# Patient Record
Sex: FEMALE | Race: WHITE | NOT HISPANIC OR LATINO | Employment: FULL TIME | URBAN - METROPOLITAN AREA
[De-identification: names, ages, dates, MRNs, and addresses within clinical notes are randomized per-mention and may not be internally consistent; named-entity substitution may affect disease eponyms.]

---

## 2017-02-20 ENCOUNTER — OFFICE VISIT (OUTPATIENT)
Dept: ORTHOPEDICS | Facility: CLINIC | Age: 22
End: 2017-02-20
Payer: COMMERCIAL

## 2017-02-20 VITALS — WEIGHT: 130 LBS | TEMPERATURE: 98.7 F | HEIGHT: 67 IN | BODY MASS INDEX: 20.4 KG/M2

## 2017-02-20 DIAGNOSIS — M67.40 GANGLION OF JOINT: Primary | ICD-10-CM

## 2017-02-20 PROCEDURE — 99213 OFFICE O/P EST LOW 20 MIN: CPT | Performed by: ORTHOPAEDIC SURGERY

## 2017-02-20 ASSESSMENT — PAIN SCALES - GENERAL: PAINLEVEL: NO PAIN (0)

## 2017-02-20 NOTE — PROGRESS NOTES
"Office Visit-Follow up  HISTORY OF PRESENT ILLNESS:    Brenda Navarrete is a 21 year old female who is seen in follow up for   Chief Complaint   Patient presents with     RECHECK     Right wrist ganglion cyst.     Surgical Followup     Right excision ganglion cyst dorsal wrist.  DOS: 11/26/14 (2.5 years postop)       Patient is accompanied by her sister today.  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  Present symptoms: She has had recurrence of the right dorsal wrist ganglion cyst. It is problematic if she is forced to put her wrist in extension or does highly manipulative activities. Several weeks ago her symptoms were markedly exacerbated however it appears so this responded to some local symptomatic care with 2 days' worth of ibuprofen.  Treatments tried to this point: She is approximately 2 years after primary surgical excision of the right wrist ganglion cyst. About a year ago we injected the dorsum of her wrist which gave her good relief. She is about 4 years after a left dorsal ganglion wrist excision done by Dr. Brewster. The left wrist is doing very well.    REVIEW OF SYSTEMS  General: negative for, night sweats, dizziness, fatigue  Resp: No shortness of breath and no cough  CV: negative for chest pain, syncope or near-syncope  GI: negative for nausea, vomiting and diarrhea  : negative for dysuria and hematuria  Musculoskeletal: as above  Neurologic: negative for syncope   Hematologic: negative for bleeding disorder    Physical Exam:  Vitals: Temp 98.7  F (37.1  C) (Temporal)  Ht 1.689 m (5' 6.5\")  Wt 59 kg (130 lb)  BMI 20.67 kg/m2  BMI= Body mass index is 20.67 kg/(m^2).  Constitutional: healthy, alert and no acute distress   Psychiatric: mentation appears normal and affect normal/bright  NEURO: no focal deficits  SKIN: no excoriation or erythema. No signs of infection.    GAIT: " non-antalgic    JOINT/EXTREMITIES:     Inspection of both wrists shows a well-healed transverse dorsal incision in the mid carpal region.  On the right wrist one can visibly see dorsal swelling over the incision. This is absent left.  She has no limitations to wrist range of motion on the right or the left.  With the right wrist palmar flexion makes the swelling more prominent. This does not happen on the left side.    The dorsal wrist region is not tender today. She has no restriction to range of motion of her digits. Range of motion of her digits does not exacerbate pain.    IMAGING INTERPRETATION: Chose to not repeat x-rays today.       Impression:      ICD-10-CM    1. Ganglion of joint dorsal right wrist M67.40        Patient has had recurrent ganglion cyst dorsal aspect of her right wrist.    Plan:   The above was reviewed with Brenda and her sister.     Patient was requesting or considering at least a repeat dorsal wrist injection. However it is understood that this has low probability of long-term success.  What we agreed upon today is that repeat surgical excision is probably what is going to be necessary.  The timing of such is really going to be the issue.    We reviewed the conservative measures that she can implement to keep her symptoms under control. The mainstay would be to use a wrist splint, iced the wrist, and taken therapeutic dose of ibuprofen.  We will be calling us when that time to schedule the surgical approach which will probably be an early summer.      Return to clinic PRN    Danis Hodge MD    Please schedule for surgery, pre op H&P, and post ops.      Patient Name:  Brenda Navarrete ([id number]).  :  1995  Gender:  female  Patient Type:  Same Day Surgery  Surgeon:  Danis Hodge MD  Physician requests assist from:  PA    Procedures:    Excision of dorsal ganglion cyst right wrist   Approach:  NA  Diagnosis:  Ganglion of joint  C-arm:  No  Mini C-arm:   No  Pathology  Scheduled:  No  Special instruments/supplies:  No  Vendor Rep:  No  Anesthesia:  General  Block:  No   Block type:   Time needed:  60 minutes    [facility] Discussed:  Not Applicable    Post op 1:

## 2017-02-20 NOTE — NURSING NOTE
"Chief Complaint   Patient presents with     RECHECK     Right wrist ganglion cyst.     Surgical Followup     Right excision ganglion cyst dorsal wrist.  DOS: 11/26/14 (2.5 years postop)       Initial Temp 98.7  F (37.1  C) (Temporal)  Ht 1.689 m (5' 6.5\")  Wt 59 kg (130 lb)  BMI 20.67 kg/m2 Estimated body mass index is 20.67 kg/(m^2) as calculated from the following:    Height as of this encounter: 1.689 m (5' 6.5\").    Weight as of this encounter: 59 kg (130 lb).  Medication Reconciliation: complete   IZAIAH Ornelas  "

## 2017-02-20 NOTE — MR AVS SNAPSHOT
"              After Visit Summary   2/20/2017    Brenda Navarrete    MRN: [id number]           Patient Information     Date Of Birth          1995        Visit Information        Provider Department      2/20/2017 1:50 PM Danis Hodge MD [facility]        Today's Diagnoses     Ganglion of joint dorsal right wrist    -  1       Follow-ups after your visit        Who to contact     If you have questions or need follow up information about today's clinic visit or your schedule please contact [facility] directly at [phone].  Normal or non-critical lab and imaging results will be communicated to you by Cernosticshart, letter or phone within 4 business days after the clinic has received the results. If you do not hear from us within 7 days, please contact the clinic through Livelenst or phone. If you have a critical or abnormal lab result, we will notify you by phone as soon as possible.  Submit refill requests through Grass[employer]! or call your pharmacy and they will forward the refill request to us. Please allow 3 business days for your refill to be completed.          Additional Information About Your Visit        MyChart Information     [employer] gives you secure access to your electronic health record. If you see a primary care provider, you can also send messages to your care team and make appointments. If you have questions, please call your primary care clinic.  If you do not have a primary care provider, please call [phone] and they will assist you.        Care EveryWhere ID     This is your Care EveryWhere ID. This could be used by other organizations to access your [place] medical records  [id number]        Your Vitals Were     Temperature Height BMI (Body Mass Index)             98.7  F (37.1  C) (Temporal) 1.689 m (5' 6.5\") 20.67 kg/m2          Blood Pressure from Last 3 Encounters:   09/27/16 110/72   08/02/16 104/64   02/01/16 110/60    Weight from Last 3 Encounters: "   02/20/17 59 kg (130 lb)   09/27/16 57.6 kg (127 lb)   08/02/16 56.7 kg (125 lb)              Today, you had the following     No orders found for display       Primary Care Provider Office Phone # Fax #    Dayron Pineda [phone]       [facility] [address] [place] [address]        Thank you!     Thank you for choosing [facility]  for your care. Our goal is always to provide you with excellent care. Hearing back from our patients is one way we can continue to improve our services. Please take a few minutes to complete the written survey that you may receive in the mail after your visit with us. Thank you!             Your Updated Medication List - Protect others around you: Learn how to safely use, store and throw away your medicines at www.disposemymeds.org.          This list is accurate as of: 2/20/17  3:13 PM.  Always use your most recent med list.                   Brand Name Dispense Instructions for use    ibuprofen 200 MG tablet    ADVIL/MOTRIN    100 tablet    Take 1-2 tablets by mouth every 6 hours as needed for other (take consistently for inflammation.).       JOLESSA 0.15-0.03 MG per tablet   Generic drug:  levonorgestrel-ethinyl estradiol     91 tablet    TAKE ONE TABLET BY MOUTH ONCE DAILY       MULTIVITAMINS PO      take 1 daily

## 2017-03-16 DIAGNOSIS — N92.6 IRREGULAR MENSTRUAL CYCLE: ICD-10-CM

## 2017-03-17 NOTE — TELEPHONE ENCOUNTER
JOLESSA 0.15-0.03 MG per tablet  Last Written Prescription Date: 4/15/16  Last Fill Quantity: 91,  # refills: 3   Last Office Visit with [facility], [facility] or [facility] prescribing provider: 9/27/16

## 2017-03-20 RX ORDER — LEVONORGESTREL AND ETHINYL ESTRADIOL 0.15-0.03
KIT ORAL
Qty: 91 TABLET | Refills: 1 | Status: SHIPPED | OUTPATIENT
Start: 2017-03-20 | End: 2017-08-31

## 2017-03-20 NOTE — TELEPHONE ENCOUNTER
Prescription approved per [facility] Refill Protocol.    Michela Dillard RN

## 2017-07-07 ENCOUNTER — HEALTH MAINTENANCE LETTER (OUTPATIENT)
Age: 22
End: 2017-07-07

## 2017-08-31 DIAGNOSIS — N92.6 IRREGULAR MENSTRUAL CYCLE: ICD-10-CM

## 2017-09-01 RX ORDER — LEVONORGESTREL AND ETHINYL ESTRADIOL 0.15-0.03
KIT ORAL
Qty: 91 TABLET | Refills: 0 | Status: SHIPPED | OUTPATIENT
Start: 2017-09-01 | End: 2017-09-20

## 2017-09-01 NOTE — TELEPHONE ENCOUNTER
Medication is being filled for 1 time refill only due to:  Patient needs to be seen because it has been more than one year since last visit.     Routing to schedulers for appointment after 09/27/2017.    Michela Dillard RN

## 2017-09-06 NOTE — TELEPHONE ENCOUNTER
Patient called back and I gave her the message about her medication. She understood and scheduled an appt.     Thank you,  Viviana Chun   for [facility]

## 2017-09-20 ENCOUNTER — OFFICE VISIT (OUTPATIENT)
Dept: FAMILY MEDICINE | Facility: CLINIC | Age: 22
End: 2017-09-20
Payer: COMMERCIAL

## 2017-09-20 VITALS
BODY MASS INDEX: 20.72 KG/M2 | OXYGEN SATURATION: 99 % | RESPIRATION RATE: 16 BRPM | DIASTOLIC BLOOD PRESSURE: 82 MMHG | HEART RATE: 78 BPM | HEIGHT: 67 IN | SYSTOLIC BLOOD PRESSURE: 126 MMHG | WEIGHT: 132 LBS | TEMPERATURE: 97.9 F

## 2017-09-20 DIAGNOSIS — Z12.4 SCREENING FOR MALIGNANT NEOPLASM OF CERVIX: ICD-10-CM

## 2017-09-20 DIAGNOSIS — Z11.3 SCREENING EXAMINATION FOR VENEREAL DISEASE: ICD-10-CM

## 2017-09-20 DIAGNOSIS — Z83.438 FAMILY HISTORY OF HYPERLIPIDEMIA: Primary | ICD-10-CM

## 2017-09-20 DIAGNOSIS — N92.6 IRREGULAR MENSTRUAL CYCLE: ICD-10-CM

## 2017-09-20 LAB
CHOLEST SERPL-MCNC: 189 MG/DL
HDLC SERPL-MCNC: 61 MG/DL
LDLC SERPL CALC-MCNC: 116 MG/DL
NONHDLC SERPL-MCNC: 128 MG/DL
TRIGL SERPL-MCNC: 59 MG/DL

## 2017-09-20 PROCEDURE — 36415 COLL VENOUS BLD VENIPUNCTURE: CPT | Performed by: OBSTETRICS & GYNECOLOGY

## 2017-09-20 PROCEDURE — 87624 HPV HI-RISK TYP POOLED RSLT: CPT | Performed by: OBSTETRICS & GYNECOLOGY

## 2017-09-20 PROCEDURE — G0145 SCR C/V CYTO,THINLAYER,RESCR: HCPCS | Performed by: OBSTETRICS & GYNECOLOGY

## 2017-09-20 PROCEDURE — 99213 OFFICE O/P EST LOW 20 MIN: CPT | Performed by: OBSTETRICS & GYNECOLOGY

## 2017-09-20 PROCEDURE — 87491 CHLMYD TRACH DNA AMP PROBE: CPT | Performed by: OBSTETRICS & GYNECOLOGY

## 2017-09-20 PROCEDURE — 87591 N.GONORRHOEAE DNA AMP PROB: CPT | Performed by: OBSTETRICS & GYNECOLOGY

## 2017-09-20 PROCEDURE — 80061 LIPID PANEL: CPT | Performed by: OBSTETRICS & GYNECOLOGY

## 2017-09-20 RX ORDER — LEVONORGESTREL AND ETHINYL ESTRADIOL 0.15-0.03
1 KIT ORAL DAILY
Qty: 91 TABLET | Refills: 3 | Status: SHIPPED | OUTPATIENT
Start: 2017-09-20 | End: 2018-11-01

## 2017-09-20 ASSESSMENT — PAIN SCALES - GENERAL: PAINLEVEL: NO PAIN (0)

## 2017-09-20 NOTE — LETTER
September 25, 2017      Brenda Navarrete  [address] [place] [address]        Dear ,    We are writing to inform you of your test results.    Your test results fall within the expected range(s).    Resulted Orders   Lipid Profile   Result Value Ref Range    Cholesterol 189 <200 mg/dL    Triglycerides 59 <150 mg/dL      Comment:      Fasting specimen    HDL Cholesterol 61 >49 mg/dL    LDL Cholesterol Calculated 116 (H) <100 mg/dL      Comment:      Above desirable:  100-129 mg/dl  Borderline High:  130-159 mg/dL  High:             160-189 mg/dL  Very high:       >189 mg/dl      Non HDL Cholesterol 128 <130 mg/dL   NEISSERIA GONORRHOEA PCR   Result Value Ref Range    Specimen Descrip Cervix     N Gonorrhea PCR Negative NEG^Negative      Comment:      Negative for N. gonorrhoeae rRNA by transcription mediated amplification.  A negative result by transcription mediated amplification does not preclude   the presence of N. gonorrhoeae infection because results are dependent on   proper and adequate collection, absence of inhibitors, and sufficient rRNA to   be detected.     CHLAMYDIA TRACHOMATIS PCR   Result Value Ref Range    Specimen Description Cervix     Chlamydia Trachomatis PCR Negative NEG^Negative      Comment:      Negative for C. trachomatis rRNA by transcription mediated amplification.  A negative result by transcription mediated amplification does not preclude   the presence of C. trachomatis infection because results are dependent on   proper and adequate collection, absence of inhibitors, and sufficient rRNA to   be detected.         If you have any questions or concerns, please call the clinic at the number listed above.       Sincerely,        Dayron Pineda MD

## 2017-09-20 NOTE — NURSING NOTE
"Chief Complaint   Patient presents with     Recheck Medication       Initial /82  Pulse 78  Temp 97.9  F (36.6  C) (Temporal)  Resp 16  Ht 5' 6.5\" (1.689 m)  Wt 132 lb (59.9 kg)  SpO2 99%  Breastfeeding? No  BMI 20.99 kg/m2 Estimated body mass index is 20.99 kg/(m^2) as calculated from the following:    Height as of this encounter: 5' 6.5\" (1.689 m).    Weight as of this encounter: 132 lb (59.9 kg)..   BP completed using cuff size: regular  Medication Rec Completed    Idania Rice CMA    "

## 2017-09-20 NOTE — PROGRESS NOTES
Subjective:  Here for rechekc of birth control pill- uses it to control heavy  And irregular  periods- it works well. No side effects.      The past medical history, social history, past surgical history and family history as shown below have been reviewed by me today.  Past Medical History:   Diagnosis Date     Lyme disease 11-02    also cat scratch disease        Allergies   Allergen Reactions     No Known Allergies      Current Outpatient Prescriptions   Medication Sig Dispense Refill     SETLAKIN 0.15-0.03 MG per tablet TAKE ONE TABLET BY MOUTH ONCE DAILY 91 tablet 0     ibuprofen (ADVIL,MOTRIN) 200 MG tablet Take 1-2 tablets by mouth every 6 hours as needed for other (take consistently for inflammation.). 100 tablet 0     MULTIVITAMINS OR take 1 daily       Past Surgical History:   Procedure Laterality Date     EXCISE GANGLION WRIST  2/8/2013    Procedure: EXCISE GANGLION WRIST;  excision left wrist dorsal cyst;  Surgeon: Karina Webb MD;  Location: [facility] OR     EXCISE GANGLION WRIST Right 11/26/2014    Procedure: EXCISE GANGLION WRIST;  Surgeon: Danis Hodge MD;  Location: [facility] OR     Social History     Social History     Marital status: Single     Spouse name: N/A     Number of children: N/A     Years of education: N/A     Social History Main Topics     Smoking status: Never Smoker     Smokeless tobacco: Never Used      Comment: NO NICOTINE EXPOSURE @ HOME     Alcohol use No     Drug use: No     Sexual activity: No     Other Topics Concern     Not on file     Social History Narrative     Family History   Problem Relation Age of Onset     DIABETES Paternal Grandfather      Cardiovascular Paternal Grandfather      Hypertension Father      Lipids Father        ROS: A 12 point review of systems was done. Except for what is listed above in the HPI, the systems review is negative .      Objective: Vital signs: Blood pressure 126/82, pulse 78, temperature 97.9  F (36.6  C), temperature source Temporal,  "resp. rate 16, height 5' 6.5\" (1.689 m), weight 132 lb (59.9 kg), SpO2 99 %, not currently breastfeeding.    HEENT:    Sclerae and conjunctiva are normal.   Ear canals and TMs look normal.  Nasal mucosa is pink  - no polyps or masses seen.  sinuses are non tender to palpation.  Throat is unremarkable . Mucous membranes are moist.   Neck is supple, mobile, no adenopathy or masses palpable. The thyroid feels normal.   Normal range of motion noted.  Chest is clear to auscultation.  No wheezes, rales or rhonchi heard.  Cardiac exam is normal with s1, s2, no murmurs or adventitious sounds.Normal rate and rhythm is heard.   Abdomen is soft,  nondistended, No masses felt.No HSM. No guarding or rigidity or rebound   noted. Palpation reveals  no    tenderness   Normal bowel sounds heard.   Pelvic exam:My nurse Idania   was present to chaperone the exam.  The external genitalia appeared normal.    The vaginal vault was without bleeding  or   discharge   or odor.   The cervix was smooth   and shiny and normal in appearance.    A pap was obtained.   A gen probe   was obtained.    No vaginal support defects were noted,    Bimanual exam revealed a  Nulliparous  sized uterus. It does not   descend   well in the vaginal vault.    No adnexal masses were felt.    There was no  cervical motion tenderness.    Exam was NOT  limited by the patient's body habitus.              Assessment/Plan:    1. Irregular menses- controlled well with birth control pill. See rx for refill     2. rechekc in 1 year        COY Pineda MD              "

## 2017-09-20 NOTE — MR AVS SNAPSHOT
"              After Visit Summary   9/20/2017    Brenda Navarrete    MRN: [id number]           Patient Information     Date Of Birth          1995        Visit Information        Provider Department      9/20/2017 7:50 AM Dayron Pineda MD [facility]        Today's Diagnoses     Family history of hyperlipidemia    -  1    Irregular menstrual cycle           Follow-ups after your visit        Who to contact     If you have questions or need follow up information about today's clinic visit or your schedule please contact [facility] directly at [phone].  Normal or non-critical lab and imaging results will be communicated to you by Ornishart, letter or phone within 4 business days after the clinic has received the results. If you do not hear from us within 7 days, please contact the clinic through Ejoy [employer] or phone. If you have a critical or abnormal lab result, we will notify you by phone as soon as possible.  Submit refill requests through [employer] or call your pharmacy and they will forward the refill request to us. Please allow 3 business days for your refill to be completed.          Additional Information About Your Visit        MyChart Information     [employer] gives you secure access to your electronic health record. If you see a primary care provider, you can also send messages to your care team and make appointments. If you have questions, please call your primary care clinic.  If you do not have a primary care provider, please call [phone] and they will assist you.        Care EveryWhere ID     This is your Care EveryWhere ID. This could be used by other organizations to access your [place] medical records  [id number]        Your Vitals Were     Pulse Temperature Respirations Height Pulse Oximetry Breastfeeding?    78 97.9  F (36.6  C) (Temporal) 16 5' 6.5\" (1.689 m) 99% No    BMI (Body Mass Index)                   20.99 kg/m2            Blood Pressure " from Last 3 Encounters:   09/20/17 126/82   09/27/16 110/72   08/02/16 104/64    Weight from Last 3 Encounters:   09/20/17 132 lb (59.9 kg)   02/20/17 130 lb (59 kg)   09/27/16 127 lb (57.6 kg)              We Performed the Following     Lipid Profile          Today's Medication Changes          These changes are accurate as of: 9/20/17  8:15 AM.  If you have any questions, ask your nurse or doctor.               These medicines have changed or have updated prescriptions.        Dose/Directions    levonorgestrel-ethinyl estradiol 0.15-0.03 MG per tablet   Commonly known as:  SETLAKIN   This may have changed:  See the new instructions.   Used for:  Irregular menstrual cycle   Changed by:  Dayron Pineda MD        Dose:  1 tablet   Take 1 tablet by mouth daily   Quantity:  91 tablet   Refills:  3            Where to get your medicines      These medications were sent to [address]  [address], [facility] [address]     Phone:  [phone]     levonorgestrel-ethinyl estradiol 0.15-0.03 MG per tablet                Primary Care Provider Office Phone # Fax #    Dayron Pineda [phone] [address] [place] [address]        Equal Access to Services     ELVIS NASSAR : Hadii venkatesh davis hadasho Soomaali, waaxda luqadaha, qaybta kaalmada adeegyada, ness escobedo. So [facility] [phone].    ATENCIÓN: Si habla español, tiene a jordan disposición servicios gratuitos de asistencia lingüística. Llame al [phone].    We comply with applicable federal civil rights laws and [place] laws. We do not discriminate on the basis of race, color, national origin, age, disability sex, sexual orientation or gender identity.            Thank you!     Thank you for choosing [facility]  for your care. Our goal is always to provide you with excellent care. Hearing back from our patients is one way we can continue  to improve our services. Please take a few minutes to complete the written survey that you may receive in the mail after your visit with us. Thank you!             Your Updated Medication List - Protect others around you: Learn how to safely use, store and throw away your medicines at www.disposemymeds.org.          This list is accurate as of: 9/20/17  8:15 AM.  Always use your most recent med list.                   Brand Name Dispense Instructions for use Diagnosis    ibuprofen 200 MG tablet    ADVIL/MOTRIN    100 tablet    Take 1-2 tablets by mouth every 6 hours as needed for other (take consistently for inflammation.).    Ganglion of joint       levonorgestrel-ethinyl estradiol 0.15-0.03 MG per tablet    SETLAKIN    91 tablet    Take 1 tablet by mouth daily    Irregular menstrual cycle       MULTIVITAMINS PO      take 1 daily

## 2017-09-21 LAB
C TRACH DNA SPEC QL NAA+PROBE: NEGATIVE
N GONORRHOEA DNA SPEC QL NAA+PROBE: NEGATIVE
SPECIMEN SOURCE: NORMAL
SPECIMEN SOURCE: NORMAL

## 2017-09-22 LAB
COPATH REPORT: NORMAL
PAP: NORMAL

## 2017-09-22 NOTE — PROGRESS NOTES
Idania Please inform Brenda/ or caretaker  that this result(s) is/are normal.  Thanks. COY Pineda MD

## 2017-09-26 LAB
FINAL DIAGNOSIS: NORMAL
HPV HR 12 DNA CVX QL NAA+PROBE: NEGATIVE
HPV16 DNA SPEC QL NAA+PROBE: NEGATIVE
HPV18 DNA SPEC QL NAA+PROBE: NEGATIVE
SPECIMEN DESCRIPTION: NORMAL

## 2018-05-29 ENCOUNTER — OFFICE VISIT (OUTPATIENT)
Dept: ORTHOPEDICS | Facility: CLINIC | Age: 23
End: 2018-05-29
Payer: COMMERCIAL

## 2018-05-29 VITALS
HEART RATE: 97 BPM | TEMPERATURE: 98.5 F | DIASTOLIC BLOOD PRESSURE: 84 MMHG | WEIGHT: 129 LBS | BODY MASS INDEX: 20.25 KG/M2 | SYSTOLIC BLOOD PRESSURE: 122 MMHG | HEIGHT: 67 IN

## 2018-05-29 DIAGNOSIS — M67.40 GANGLION OF JOINT: Primary | ICD-10-CM

## 2018-05-29 PROCEDURE — 20612 ASPIRATE/INJ GANGLION CYST: CPT | Mod: RT | Performed by: ORTHOPAEDIC SURGERY

## 2018-05-29 PROCEDURE — 99212 OFFICE O/P EST SF 10 MIN: CPT | Mod: 25 | Performed by: ORTHOPAEDIC SURGERY

## 2018-05-29 RX ORDER — BETAMETHASONE SODIUM PHOSPHATE AND BETAMETHASONE ACETATE 3; 3 MG/ML; MG/ML
12 INJECTION, SUSPENSION INTRA-ARTICULAR; INTRALESIONAL; INTRAMUSCULAR; SOFT TISSUE ONCE
Qty: 2 ML | Refills: 0 | COMMUNITY
Start: 2018-05-29 | End: 2018-11-01

## 2018-05-29 ASSESSMENT — PAIN SCALES - GENERAL: PAINLEVEL: MILD PAIN (3)

## 2018-05-29 NOTE — LETTER
"    5/29/2018         RE: Brenda Navarrete  [address] [place] [facility] [address]        Dear Colleague,    Thank you for referring your patient, Brenda Navarrete, to the [facility]. Please see a copy of my visit note below.    Office Visit-Follow up  HISTORY OF PRESENT ILLNESS:    Brenda Navarrete is a 23 year old female who is seen in follow up for   Chief Complaint   Patient presents with     RECHECK     Right wrist ganglion cyst.       Patient presents with:  RECHECK: Right wrist ganglion cyst.      As a review this patient underwent an excision of the right dorsal ganglion cyst several years ago.  She has had a recurrence.  However, previous dorsal cortisone injections have given her extended relief.  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  Present symptoms: She has pain over the dorsum of the wrist.  This is exacerbated by increased used and extremes of range of motion.  Treatments tried to this point: See above.    REVIEW OF SYSTEMS    General: negative for, night sweats, dizziness, fatigue  Resp: No shortness of breath and no cough  CV: negative for chest pain, syncope or near-syncope  GI: negative for nausea, vomiting and diarrhea  : negative for dysuria and hematuria  Musculoskeletal: as above  Neurologic: negative for syncope   Hematologic: negative for bleeding disorder    Physical Exam:    Vitals: /84 (BP Location: Left arm, Patient Position: Chair, Cuff Size: Adult Regular)  Pulse 97  Temp 98.5  F (36.9  C) (Temporal)  Ht 1.689 m (5' 6.5\")  Wt 58.5 kg (129 lb)  BMI 20.51 kg/m2  BMI= Body mass index is 20.51 kg/(m^2).  Constitutional: healthy, alert and no acute distress   Psychiatric: mentation appears normal and affect normal/bright  NEURO: no focal deficits  SKIN: no excoriation or erythema. No signs of infection.    GAIT: non-antalgic    JOINT/EXTREMITIES:     One can see at the healed transverse incision over the dorsum of her wrist.  Directly " beneath this is a soft tissue fullness.  There is no surrounding redness.  There is some mild discomfort to palpation.  The cyst is soft but noncompressible.  Overall wrist range of motion is well-maintained except dorsiflexion is down by several degrees.    She has no loss of range of motion of her digits.    IMAGING INTERPRETATION: X-rays were not taken.       Impression:      ICD-10-CM    1. Ganglion of joint dorsal right wrist M67.40 DRAIN/INJECT SMALL JOINT/BURSA       Recurrent dorsal right wrist ganglion.    Plan:   The above was reviewed with Brenda and she specifically requests a repeat injection..     Because the injection is worked so well in the past and because there is always the risk of recurrence it is a reasonable thing to repeat the injection.    Therefore after consent was obtained the injection was repeated using strict sterile technique.  We infiltrated the area with 1 cc of Celestone and 3 cc of lidocaine.  I also switched from a 25-gauge needle to a 18-gauge needle and pierced the area multiple times trying to stimulate bleeding and hopeful scarring that might get it to resolve.  She did tolerate this very well.  The area was simply dressed with a Band-Aid and no splint was recommended.          Return to clinic PRN    Danis Hodge MD    Again, thank you for allowing me to participate in the care of your patient.        Sincerely,        Danis Hodge MD

## 2018-05-29 NOTE — MR AVS SNAPSHOT
"              After Visit Summary   5/29/2018    Brenda Navarrete    MRN: [id number]           Patient Information     Date Of Birth          1995        Visit Information        Provider Department      5/29/2018 7:50 AM Danis Hodge MD [facility]        Today's Diagnoses     Ganglion of joint dorsal right wrist    -  1       Follow-ups after your visit        Who to contact     If you have questions or need follow up information about today's clinic visit or your schedule please contact [facility] directly at [phone].  Normal or non-critical lab and imaging results will be communicated to you by Raisehart, letter or phone within 4 business days after the clinic has received the results. If you do not hear from us within 7 days, please contact the clinic through Livemochat or phone. If you have a critical or abnormal lab result, we will notify you by phone as soon as possible.  Submit refill requests through Desk or call your pharmacy and they will forward the refill request to us. Please allow 3 business days for your refill to be completed.          Additional Information About Your Visit        MyChart Information     Desk gives you secure access to your electronic health record. If you see a primary care provider, you can also send messages to your care team and make appointments. If you have questions, please call your primary care clinic.  If you do not have a primary care provider, please call [phone] and they will assist you.        Care EveryWhere ID     This is your Care EveryWhere ID. This could be used by other organizations to access your Oral medical records  [id number]        Your Vitals Were     Pulse Temperature Height BMI (Body Mass Index)          97 98.5  F (36.9  C) (Temporal) 1.689 m (5' 6.5\") 20.51 kg/m2         Blood Pressure from Last 3 Encounters:   05/29/18 122/84   09/20/17 126/82   09/27/16 110/72    Weight from Last 3 " Encounters:   05/29/18 58.5 kg (129 lb)   09/20/17 59.9 kg (132 lb)   02/20/17 59 kg (130 lb)              We Performed the Following     DRAIN/INJECT SMALL JOINT/BURSA        Primary Care Provider Office Phone # Fax #    Dayron Pineda [phone] [address] [place] [address]        Equal Access to Services     KARL NASSAR : Hadii aad ku hadasho Soomaali, waaxda luqadaha, qaybta kaalmada adeegyada, waxay idiin hayaan marcie trevinojuanmayuri paul . So [facility] [phone].    ATENCIÓN: Si dianla espkelsi, tiene a jordan disposición servicios gratuitos de asistencia lingüística. Llame al [phone].    We comply with applicable federal civil rights laws and [place] laws. We do not discriminate on the basis of race, color, national origin, age, disability, sex, sexual orientation, or gender identity.            Thank you!     Thank you for choosing [facility]  for your care. Our goal is always to provide you with excellent care. Hearing back from our patients is one way we can continue to improve our services. Please take a few minutes to complete the written survey that you may receive in the mail after your visit with us. Thank you!             Your Updated Medication List - Protect others around you: Learn how to safely use, store and throw away your medicines at www.disposemymeds.org.          This list is accurate as of 5/29/18  8:51 AM.  Always use your most recent med list.                   Brand Name Dispense Instructions for use Diagnosis    ibuprofen 200 MG tablet    ADVIL/MOTRIN    100 tablet    Take 1-2 tablets by mouth every 6 hours as needed for other (take consistently for inflammation.).    Ganglion of joint       levonorgestrel-ethinyl estradiol 0.15-0.03 MG per tablet    SETLAKIN    91 tablet    Take 1 tablet by mouth daily    Irregular menstrual cycle       MULTIVITAMINS PO      take 1 daily

## 2018-05-29 NOTE — PROGRESS NOTES
"Office Visit-Follow up  HISTORY OF PRESENT ILLNESS:    Brenda Navarrete is a 23 year old female who is seen in follow up for   Chief Complaint   Patient presents with     RECHECK     Right wrist ganglion cyst.       Patient presents with:  RECHECK: Right wrist ganglion cyst.      As a review this patient underwent an excision of the right dorsal ganglion cyst several years ago.  She has had a recurrence.  However, previous dorsal cortisone injections have given her extended relief.  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  Present symptoms: She has pain over the dorsum of the wrist.  This is exacerbated by increased used and extremes of range of motion.  Treatments tried to this point: See above.    REVIEW OF SYSTEMS    General: negative for, night sweats, dizziness, fatigue  Resp: No shortness of breath and no cough  CV: negative for chest pain, syncope or near-syncope  GI: negative for nausea, vomiting and diarrhea  : negative for dysuria and hematuria  Musculoskeletal: as above  Neurologic: negative for syncope   Hematologic: negative for bleeding disorder    Physical Exam:    Vitals: /84 (BP Location: Left arm, Patient Position: Chair, Cuff Size: Adult Regular)  Pulse 97  Temp 98.5  F (36.9  C) (Temporal)  Ht 1.689 m (5' 6.5\")  Wt 58.5 kg (129 lb)  BMI 20.51 kg/m2  BMI= Body mass index is 20.51 kg/(m^2).  Constitutional: healthy, alert and no acute distress   Psychiatric: mentation appears normal and affect normal/bright  NEURO: no focal deficits  SKIN: no excoriation or erythema. No signs of infection.    GAIT: non-antalgic    JOINT/EXTREMITIES:     One can see at the healed transverse incision over the dorsum of her wrist.  Directly beneath this is a soft tissue fullness.  There is no surrounding redness.  There is some mild discomfort to palpation.  The cyst is soft but noncompressible.  Overall wrist range of motion is well-maintained except dorsiflexion is down by several " degrees.    She has no loss of range of motion of her digits.    IMAGING INTERPRETATION: X-rays were not taken.       Impression:      ICD-10-CM    1. Ganglion of joint dorsal right wrist M67.40 DRAIN/INJECT SMALL JOINT/BURSA       Recurrent dorsal right wrist ganglion.    Plan:   The above was reviewed with Brenda and she specifically requests a repeat injection..     Because the injection is worked so well in the past and because there is always the risk of recurrence it is a reasonable thing to repeat the injection.    Therefore after consent was obtained the injection was repeated using strict sterile technique.  We infiltrated the area with 1 cc of Celestone and 3 cc of lidocaine.  I also switched from a 25-gauge needle to a 18-gauge needle and pierced the area multiple times trying to stimulate bleeding and hopeful scarring that might get it to resolve.  She did tolerate this very well.  The area was simply dressed with a Band-Aid and no splint was recommended.          Return to clinic JIMYN    Danis Hodge MD

## 2018-11-01 ENCOUNTER — OFFICE VISIT (OUTPATIENT)
Dept: FAMILY MEDICINE | Facility: CLINIC | Age: 23
End: 2018-11-01
Payer: COMMERCIAL

## 2018-11-01 VITALS
SYSTOLIC BLOOD PRESSURE: 126 MMHG | OXYGEN SATURATION: 97 % | WEIGHT: 134 LBS | HEIGHT: 67 IN | DIASTOLIC BLOOD PRESSURE: 82 MMHG | RESPIRATION RATE: 16 BRPM | BODY MASS INDEX: 21.03 KG/M2 | HEART RATE: 76 BPM | TEMPERATURE: 97.4 F

## 2018-11-01 DIAGNOSIS — Z23 NEED FOR PROPHYLACTIC VACCINATION AND INOCULATION AGAINST INFLUENZA: ICD-10-CM

## 2018-11-01 DIAGNOSIS — Z00.00 ENCOUNTER FOR WELL ADULT EXAM WITHOUT ABNORMAL FINDINGS: Primary | ICD-10-CM

## 2018-11-01 DIAGNOSIS — N92.6 IRREGULAR MENSTRUAL CYCLE: ICD-10-CM

## 2018-11-01 DIAGNOSIS — Z11.3 SCREEN FOR STD (SEXUALLY TRANSMITTED DISEASE): ICD-10-CM

## 2018-11-01 PROCEDURE — 87591 N.GONORRHOEAE DNA AMP PROB: CPT | Performed by: OBSTETRICS & GYNECOLOGY

## 2018-11-01 PROCEDURE — 99395 PREV VISIT EST AGE 18-39: CPT | Mod: 25 | Performed by: OBSTETRICS & GYNECOLOGY

## 2018-11-01 PROCEDURE — 90471 IMMUNIZATION ADMIN: CPT | Performed by: OBSTETRICS & GYNECOLOGY

## 2018-11-01 PROCEDURE — 90686 IIV4 VACC NO PRSV 0.5 ML IM: CPT | Performed by: OBSTETRICS & GYNECOLOGY

## 2018-11-01 PROCEDURE — 87491 CHLMYD TRACH DNA AMP PROBE: CPT | Performed by: OBSTETRICS & GYNECOLOGY

## 2018-11-01 RX ORDER — LEVONORGESTREL AND ETHINYL ESTRADIOL 0.15-0.03
1 KIT ORAL DAILY
Qty: 91 TABLET | Refills: 3 | Status: SHIPPED | OUTPATIENT
Start: 2018-11-01 | End: 2019-10-07

## 2018-11-01 ASSESSMENT — ENCOUNTER SYMPTOMS
PARESTHESIAS: 0
ABDOMINAL PAIN: 0
FREQUENCY: 0
NERVOUS/ANXIOUS: 0
HEARTBURN: 0
ARTHRALGIAS: 0
SORE THROAT: 0
COUGH: 0
PALPITATIONS: 0
HEMATOCHEZIA: 0
HEADACHES: 0
WEAKNESS: 0
DIARRHEA: 0
BREAST MASS: 0
SHORTNESS OF BREATH: 0
DIZZINESS: 0
CHILLS: 0
JOINT SWELLING: 0
FEVER: 0
DYSURIA: 0
CONSTIPATION: 0
NAUSEA: 0
HEMATURIA: 0
EYE PAIN: 0
MYALGIAS: 0

## 2018-11-01 ASSESSMENT — PAIN SCALES - GENERAL: PAINLEVEL: NO PAIN (0)

## 2018-11-01 NOTE — PROGRESS NOTES
Injectable Influenza Immunization Documentation    1.  Is the person to be vaccinated sick today?   No    2. Does the person to be vaccinated have an allergy to a component   of the vaccine?   No  Egg Allergy Algorithm Link    3. Has the person to be vaccinated ever had a serious reaction   to influenza vaccine in the past?   No    4. Has the person to be vaccinated ever had Guillain-Barré syndrome?   No    Form completed by Nicho Arellano MA  Prior to injection verified patient identity using patient's name and date of birth.  Per orders of Dr. Pineda, injection of fluzone given by Nicho Arellano. Patient instructed to remain in clinic for 20 minutes afterwards, and to report any adverse reaction to me immediately. Nicho Arelalno MA

## 2018-11-01 NOTE — PROGRESS NOTES
"Subjective:Brenda is here for yearly physical. Current concerns are: none        Past Medical History:   Diagnosis Date     Lyme disease 11-02    also cat scratch disease      Current Outpatient Prescriptions   Medication Sig Dispense Refill     levonorgestrel-ethinyl estradiol (SETLAKIN) 0.15-0.03 MG per tablet Take 1 tablet by mouth daily 91 tablet 3     MULTIVITAMINS OR take 1 daily       ibuprofen (ADVIL,MOTRIN) 200 MG tablet Take 1-2 tablets by mouth every 6 hours as needed for other (take consistently for inflammation.). (Patient not taking: Reported on 11/1/2018) 100 tablet 0     [DISCONTINUED] levonorgestrel-ethinyl estradiol (SETLAKIN) 0.15-0.03 MG per tablet Take 1 tablet by mouth daily 91 tablet 3      Allergies   Allergen Reactions     No Known Allergies       History   Smoking Status     Never Smoker   Smokeless Tobacco     Never Used     Comment: NO NICOTINE EXPOSURE @ HOME      Past Surgical History:   Procedure Laterality Date     EXCISE GANGLION WRIST  2/8/2013    Procedure: EXCISE GANGLION WRIST;  excision left wrist dorsal cyst;  Surgeon: Karina Webb MD;  Location: [facility]     EXCISE GANGLION WRIST Right 11/26/2014    Procedure: EXCISE GANGLION WRIST;  Surgeon: Danis Hodge MD;  Location: [facility] OR      Social History   Substance Use Topics     Smoking status: Never Smoker     Smokeless tobacco: Never Used      Comment: NO NICOTINE EXPOSURE @ HOME     Alcohol use No      Family History   Problem Relation Age of Onset     Diabetes Paternal Grandfather      Cardiovascular Paternal Grandfather      Hypertension Father      Lipids Father        ROS: A 12 point review of systems is negative except for the following:  See above      Physical Exam: /82  Pulse 76  Temp 97.4  F (36.3  C) (Temporal)  Resp 16  Ht 5' 6.5\" (1.689 m)  Wt 134 lb (60.8 kg)  SpO2 97%  Breastfeeding? No  BMI 21.3 kg/m2  HEENT:    Sclerae and conjunctiva are normal.   Ear canals and TMs look normal.  Nasal " mucosa is pink  - no polyps or masses seen.  sinuses are non tender to palpation.  Throat is unremarkable . Mucous membranes are moist.   Neck is supple, mobile, no adenopathy or masses palpable. The thyroid feels normal.   Normal range of motion noted.  Chest is clear to auscultation.  No wheezes, rales or rhonchi heard.  Cardiac exam is normal with s1, s2, no murmurs or adventitious sounds.Normal rate and rhythm is heard.   Abdomen is soft,  nondistended, No masses felt.No HSM. No guarding or rigidity or rebound   noted. Palpation reveals  no    tenderness   Normal bowel sounds heard.   Pelvic exam:she declined today.      The breast exam was declined.      We did discuss the option for an exam   and I suggested that she should be doing a self-breast exam   monthly and after the age of 40 a yearly mammogram   and she feels comfortable that this is sufficient.           Assessment/Plan:    The yearly exam is normal.            Additional Plan:Diet and rest and exercise discussed.      I have advised going for a 5 mile walk daily if possible       See labs and orders.    .Immunizations reviewed(TDAP, pneumovax, shingles vaccine,etc)and discussed-including advice for yearly flu shot/tetanus update.     The following vaccines given today:   Flu vax          Calcium supplementation advised        I advised the following exams with specialists:    1. Dental evaluation yearly    2. Dermatology evaluation for mole exam yearly    3. Ophthalmology exam yearly to check for glaucoma, etc        Living will was discussed.         Followup in 12   months, sooner if concerns arise.   COY Pineda MD(electronic signature)    Answers for HPI/ROS submitted by the patient on 11/1/2018   Annual Exam:  Frequency of exercise:: 2-3 days/week  Getting at least 3 servings of Calcium per day:: Yes  Diet:: Regular (no restrictions)  Taking medications regularly:: No  Medication side effects:: None  Bi-annual eye exam:: Yes  Dental care twice  a year:: Yes  Sleep apnea or symptoms of sleep apnea:: None  Negative for the following: abdominal pain, Blood in stool, Blood in urine, chest pain, chills, congestion, constipation, cough, diarrhea, dizziness, ear pain, eye pain, nervous/anxious, fever, frequency, genital sores, headaches, hearing loss, heartburn, arthralgias, joint swelling, peripheral edema, mood changes, myalgias, nausea, dysuria, palpitations, Skin sensation changes, sore throat, urgency, rash, shortness of breath, visual disturbance, weakness  pelvic pain: No  vaginal bleeding: No  vaginal discharge: No  tenderness: No  breast mass: No  breast discharge: No  Additional concerns today:: No  PHQ-2 Score: 0  Duration of exercise:: 30-45 minutes  Barriers to taking medications:: None

## 2018-11-01 NOTE — MR AVS SNAPSHOT
After Visit Summary   11/1/2018    Brenda Navarrete    MRN: [id number]           Patient Information     Date Of Birth          1995        Visit Information        Provider Department      11/1/2018 8:50 AM Dayron Pineda MD [facility]        Today's Diagnoses     Encounter for well adult exam without abnormal findings    -  1    Irregular menstrual cycle        Screen for STD (sexually transmitted disease)        Need for prophylactic vaccination and inoculation against influenza           Follow-ups after your visit        Follow-up notes from your care team     Return in about 1 year (around 11/1/2019) for Physical Exam.      Who to contact     If you have questions or need follow up information about today's clinic visit or your schedule please contact [facility] directly at [phone].  Normal or non-critical lab and imaging results will be communicated to you by MyChart, letter or phone within 4 business days after the clinic has received the results. If you do not hear from us within 7 days, please contact the clinic through MyChart or phone. If you have a critical or abnormal lab result, we will notify you by phone as soon as possible.  Submit refill requests through [employer] or call your pharmacy and they will forward the refill request to us. Please allow 3 business days for your refill to be completed.          Additional Information About Your Visit        MyChart Information     [employer] gives you secure access to your electronic health record. If you see a primary care provider, you can also send messages to your care team and make appointments. If you have questions, please call your primary care clinic.  If you do not have a primary care provider, please call [phone] and they will assist you.        Care EveryWhere ID     This is your Care EveryWhere ID. This could be used by other organizations to access your [facility]  "records  [id number]        Your Vitals Were     Pulse Temperature Respirations Height Pulse Oximetry Breastfeeding?    76 97.4  F (36.3  C) (Temporal) 16 5' 6.5\" (1.689 m) 97% No    BMI (Body Mass Index)                   21.3 kg/m2            Blood Pressure from Last 3 Encounters:   11/01/18 126/82   05/29/18 122/84   09/20/17 126/82    Weight from Last 3 Encounters:   11/01/18 134 lb (60.8 kg)   05/29/18 129 lb (58.5 kg)   09/20/17 132 lb (59.9 kg)              We Performed the Following     CHLAMYDIA TRACHOMATIS PCR     FLU VACCINE, SPLIT VIRUS, IM (QUADRIVALENT) [65991]- >3 YRS     NEISSERIA GONORRHOEA PCR     Vaccine Administration, Initial [[id number]]          Where to get your medicines      These medications were sent to [address]  [address], [facility] [address]     Phone:  [phone]     levonorgestrel-ethinyl estradiol 0.15-0.03 MG per tablet          Primary Care Provider Office Phone # Fax #    Dayron Pineda [phone] [address] [place] [address]        Equal Access to Services     KARL NASSAR AH: Charmaine lackeyo Sojerzy, waaxda luqadaha, qaybta kaalmada adeegyada, ness escobedo. So [facility] [phone].    ATENCIÓN: Si habla español, tiene a jordan disposición servicios gratuitos de asistencia lingüística. [facility] [phone].    We comply with applicable federal civil rights laws and [place] laws. We do not discriminate on the basis of race, color, national origin, age, disability, sex, sexual orientation, or gender identity.            Thank you!     Thank you for choosing [facility]  for your care. Our goal is always to provide you with excellent care. Hearing back from our patients is one way we can continue to improve our services. Please take a few minutes to complete the written survey that you may receive in the mail after your visit with us. Thank you!      "        Your Updated Medication List - Protect others around you: Learn how to safely use, store and throw away your medicines at www.disposemymeds.org.          This list is accurate as of 11/1/18  9:30 AM.  Always use your most recent med list.                   Brand Name Dispense Instructions for use Diagnosis    ibuprofen 200 MG tablet    ADVIL/MOTRIN    100 tablet    Take 1-2 tablets by mouth every 6 hours as needed for other (take consistently for inflammation.).    Ganglion of joint       levonorgestrel-ethinyl estradiol 0.15-0.03 MG per tablet    SETLAKIN    91 tablet    Take 1 tablet by mouth daily    Irregular menstrual cycle       MULTIVITAMINS PO      take 1 daily

## 2019-09-28 ENCOUNTER — HEALTH MAINTENANCE LETTER (OUTPATIENT)
Age: 24
End: 2019-09-28

## 2019-10-06 DIAGNOSIS — N92.6 IRREGULAR MENSTRUAL CYCLE: ICD-10-CM

## 2019-10-07 DIAGNOSIS — N92.6 IRREGULAR MENSTRUAL CYCLE: ICD-10-CM

## 2019-10-07 RX ORDER — LEVONORGESTREL AND ETHINYL ESTRADIOL 0.15-0.03
1 KIT ORAL DAILY
Qty: 91 TABLET | Refills: 3 | Status: SHIPPED | OUTPATIENT
Start: 2019-10-07 | End: 2019-11-04

## 2019-10-07 RX ORDER — LEVONORGESTREL AND ETHINYL ESTRADIOL 0.15-0.03
KIT ORAL
Qty: 91 TABLET | Refills: 3 | OUTPATIENT
Start: 2019-10-07

## 2019-10-07 NOTE — TELEPHONE ENCOUNTER
"Duplicate request  Already filled today 10/7/2019  Requested Prescriptions   Pending Prescriptions Disp Refills     SETLAKIN 0.15-0.03 MG tablet [Pharmacy Med Name: SETLAKIN 0.15-0.03MG TABS] 91 tablet 3     Sig: TAKE ONE TABLET BY MOUTH DAILY   Last Written Prescription Date:  10/7/2019  Last Fill Quantity: 91,  # refills: 3   Last office visit: 11/1/2018 with prescribing provider:     Future Office Visit:        Contraceptives Protocol Passed - 10/6/2019  9:34 PM        Passed - Patient is not a current smoker if age is 35 or older        Passed - Recent (12 mo) or future (30 days) visit within the authorizing provider's specialty     Patient has had an office visit with the authorizing provider or a provider within the authorizing providers department within the previous 12 mos or has a future within next 30 days. See \"Patient Info\" tab in inbasket, or \"Choose Columns\" in Meds & Orders section of the refill encounter.              Passed - Medication is active on med list        Passed - No active pregnancy on record        Passed - No positive pregnancy test in past 12 months      Sera Jean RN      "

## 2019-11-04 ENCOUNTER — OFFICE VISIT (OUTPATIENT)
Dept: FAMILY MEDICINE | Facility: CLINIC | Age: 24
End: 2019-11-04
Payer: COMMERCIAL

## 2019-11-04 VITALS
SYSTOLIC BLOOD PRESSURE: 122 MMHG | OXYGEN SATURATION: 98 % | WEIGHT: 133.5 LBS | DIASTOLIC BLOOD PRESSURE: 78 MMHG | TEMPERATURE: 98.4 F | RESPIRATION RATE: 12 BRPM | HEART RATE: 90 BPM | HEIGHT: 66 IN | BODY MASS INDEX: 21.45 KG/M2

## 2019-11-04 DIAGNOSIS — Z23 NEED FOR PROPHYLACTIC VACCINATION AND INOCULATION AGAINST INFLUENZA: ICD-10-CM

## 2019-11-04 DIAGNOSIS — Z00.00 ENCOUNTER FOR WELL ADULT EXAM WITHOUT ABNORMAL FINDINGS: Primary | ICD-10-CM

## 2019-11-04 DIAGNOSIS — N92.6 IRREGULAR MENSTRUAL CYCLE: ICD-10-CM

## 2019-11-04 PROCEDURE — 87491 CHLMYD TRACH DNA AMP PROBE: CPT | Performed by: OBSTETRICS & GYNECOLOGY

## 2019-11-04 PROCEDURE — 99395 PREV VISIT EST AGE 18-39: CPT | Mod: 25 | Performed by: OBSTETRICS & GYNECOLOGY

## 2019-11-04 PROCEDURE — 90686 IIV4 VACC NO PRSV 0.5 ML IM: CPT | Performed by: OBSTETRICS & GYNECOLOGY

## 2019-11-04 PROCEDURE — 90471 IMMUNIZATION ADMIN: CPT | Performed by: OBSTETRICS & GYNECOLOGY

## 2019-11-04 PROCEDURE — 87591 N.GONORRHOEAE DNA AMP PROB: CPT | Performed by: OBSTETRICS & GYNECOLOGY

## 2019-11-04 RX ORDER — LEVONORGESTREL AND ETHINYL ESTRADIOL 0.15-0.03
1 KIT ORAL DAILY
Qty: 91 TABLET | Refills: 3 | Status: SHIPPED | OUTPATIENT
Start: 2019-11-04 | End: 2020-11-18

## 2019-11-04 ASSESSMENT — MIFFLIN-ST. JEOR: SCORE: 1384.44

## 2019-11-04 ASSESSMENT — PAIN SCALES - GENERAL: PAINLEVEL: NO PAIN (0)

## 2019-11-04 NOTE — PROGRESS NOTES
"Subjective:Brenda is here for yearly physical. Current concerns are: Irregular menstrual cycles and is requesting that I refill the Jolessa.        Past Medical History:   Diagnosis Date     Lyme disease 11-02    also cat scratch disease      Current Outpatient Medications   Medication Sig Dispense Refill     ibuprofen (ADVIL,MOTRIN) 200 MG tablet Take 1-2 tablets by mouth every 6 hours as needed for other (take consistently for inflammation.). 100 tablet 0     levonorgestrel-ethinyl estradiol (SETLAKIN) 0.15-0.03 MG tablet Take 1 tablet by mouth daily 91 tablet 3     MULTIVITAMINS OR take 1 daily        Allergies   Allergen Reactions     No Known Allergies       History   Smoking Status     Never Smoker   Smokeless Tobacco     Never Used     Comment: NO NICOTINE EXPOSURE @ HOME      Past Surgical History:   Procedure Laterality Date     EXCISE GANGLION WRIST  2/8/2013    Procedure: EXCISE GANGLION WRIST;  excision left wrist dorsal cyst;  Surgeon: Karina Webb MD;  Location: [facility]     EXCISE GANGLION WRIST Right 11/26/2014    Procedure: EXCISE GANGLION WRIST;  Surgeon: Danis Hodge MD;  Location: [facility] OR      Social History     Tobacco Use     Smoking status: Never Smoker     Smokeless tobacco: Never Used     Tobacco comment: NO NICOTINE EXPOSURE @ HOME   Substance Use Topics     Alcohol use: No     Drug use: No      Family History   Problem Relation Age of Onset     Diabetes Paternal Grandfather      Cardiovascular Paternal Grandfather      Hypertension Father      Lipids Father        ROS: A 12 point review of systems is negative except for the following:  See above      Physical Exam: /78   Pulse 90   Temp 98.4  F (36.9  C) (Temporal)   Resp 12   Ht 1.688 m (5' 6.45\")   Wt 60.6 kg (133 lb 8 oz)   LMP  (LMP Unknown)   SpO2 98%   Breastfeeding? No   BMI 21.26 kg/m    HEENT:    Sclerae and conjunctiva are normal.   Ear canals and TMs look normal.  Nasal mucosa is pink  - no polyps or " masses seen.  Throat is unremarkable . Mucous membranes are moist.   Neck is supple, mobile, no adenopathy or masses palpable. The thyroid feels normal.   Normal range of motion noted.  Chest is clear to auscultation.  No wheezes, rales or rhonchi heard.  Cardiac exam is normal with s1, s2, no murmurs or adventitious sounds.Normal rate and rhythm is heard.   Abdomen is soft,  nondistended, No masses felt.No HSM. No guarding or rigidity or rebound   noted. Palpation reveals  no    tenderness   Normal bowel sounds heard.   Pelvic exam:declined.          The breast exam was declined.      We did discuss the option for an exam   and I suggested that she should be doing a self-breast exam   monthly and after the age of 40 a yearly mammogram   and she feels comfortable that this is sufficient.         Assessment/Plan:    The yearly exam is normal.        Additional Plan:Diet and rest and exercise discussed.      I have advised going for a 5 mile walk daily if possible       See labs and orders.    .Immunizations reviewed(TDAP, pneumovax, shingles vaccine,etc)and discussed-including advice for yearly flu shot/tetanus update.     The following vaccines given today:   flu    Hepatitis C and HIV testing offered    these tests were declined.     Calcium supplementation advised     Colonoscopy at age 50      Mammogram  Is advised beginning at age 40-pt to be responsible for getting this done       Labs done: see orders      I advised the following exams with specialists:    1. Dental evaluation yearly    2. Dermatology evaluation for mole exam yearly    3. Ophthalmology exam yearly to check for glaucoma, etc        Living will was discussed.         Followup in 12   months, sooner if concerns arise.   COY Pineda MD(electronic signature)

## 2019-11-06 NOTE — RESULT ENCOUNTER NOTE
Kaleigh/Nicho Mccarty,Please inform Brenda/ or caretaker  that this result(s) is/are normal.  Thanks. COY Pineda MD

## 2019-12-15 ENCOUNTER — OFFICE VISIT (OUTPATIENT)
Dept: URGENT CARE | Facility: RETAIL CLINIC | Age: 24
End: 2019-12-15
Payer: COMMERCIAL

## 2019-12-15 VITALS
HEART RATE: 126 BPM | SYSTOLIC BLOOD PRESSURE: 131 MMHG | TEMPERATURE: 99 F | DIASTOLIC BLOOD PRESSURE: 83 MMHG | OXYGEN SATURATION: 99 %

## 2019-12-15 DIAGNOSIS — J02.9 ACUTE PHARYNGITIS, UNSPECIFIED ETIOLOGY: ICD-10-CM

## 2019-12-15 DIAGNOSIS — J02.0 STREP THROAT: Primary | ICD-10-CM

## 2019-12-15 LAB — S PYO AG THROAT QL IA.RAPID: ABNORMAL

## 2019-12-15 PROCEDURE — 87880 STREP A ASSAY W/OPTIC: CPT | Mod: QW | Performed by: NURSE PRACTITIONER

## 2019-12-15 PROCEDURE — 99213 OFFICE O/P EST LOW 20 MIN: CPT | Performed by: NURSE PRACTITIONER

## 2019-12-15 RX ORDER — AMOXICILLIN 500 MG/1
500 CAPSULE ORAL 2 TIMES DAILY
Qty: 20 CAPSULE | Refills: 0 | Status: SHIPPED | OUTPATIENT
Start: 2019-12-15 | End: 2020-02-19

## 2019-12-15 ASSESSMENT — ENCOUNTER SYMPTOMS
FEVER: 1
MYALGIAS: 0
CHILLS: 1
HEADACHES: 1
COUGH: 0
SLEEP DISTURBANCE: 1
VOICE CHANGE: 1
APPETITE CHANGE: 1
NAUSEA: 1
SORE THROAT: 1
ADENOPATHY: 1
DIAPHORESIS: 1

## 2019-12-15 NOTE — PROGRESS NOTES
Chief Complaint   Patient presents with     Pharyngitis     started yesterday     Headache     Fever     SUBJECTIVE:  Brenda Navarrete is a 24 year old female presenting with a chief complaint of a sore throat, headache, gi upset and fever for 2 days.  Course of illness: sudden onset and worsening.  Severity: moderate  Treatment measures tried include: Tylenol/Ibuprofen.  Predisposing factors include: has had strep before.    Past Medical History:   Diagnosis Date     Lyme disease 11-02    also cat scratch disease     levonorgestrel-ethinyl estradiol (SETLAKIN) 0.15-0.03 MG tablet, Take 1 tablet by mouth daily  MULTIVITAMINS OR, take 1 daily  ibuprofen (ADVIL,MOTRIN) 200 MG tablet, Take 1-2 tablets by mouth every 6 hours as needed for other (take consistently for inflammation.). (Patient not taking: Reported on 12/15/2019)    No current facility-administered medications on file prior to visit.     Social History     Tobacco Use     Smoking status: Never Smoker     Smokeless tobacco: Never Used     Tobacco comment: NO NICOTINE EXPOSURE @ HOME   Substance Use Topics     Alcohol use: No     Allergies   Allergen Reactions     No Known Allergies      Review of Systems   Constitutional: Positive for appetite change, chills, diaphoresis and fever.   HENT: Positive for sore throat and voice change. Negative for congestion and ear pain.    Respiratory: Negative for cough.    Gastrointestinal: Positive for nausea.   Musculoskeletal: Negative for myalgias.   Skin: Negative for rash.   Neurological: Positive for headaches.   Hematological: Positive for adenopathy.   Psychiatric/Behavioral: Positive for sleep disturbance.     OBJECTIVE:   /83   Pulse 126   Temp 99  F (37.2  C) (Oral)   SpO2 99%      Physical Exam  Vitals signs reviewed.   Constitutional:       General: She is not in acute distress.     Appearance: She is well-developed. She is not diaphoretic.   HENT:      Head: Normocephalic and atraumatic.       "Mouth/Throat:      Mouth: Mucous membranes are moist.      Pharynx: Oropharyngeal exudate and posterior oropharyngeal erythema present.   Eyes:      Conjunctiva/sclera: Conjunctivae normal.      Pupils: Pupils are equal, round, and reactive to light.   Neck:      Musculoskeletal: Normal range of motion and neck supple.   Cardiovascular:      Rate and Rhythm: Normal rate.   Pulmonary:      Effort: Pulmonary effort is normal. No respiratory distress.   Musculoskeletal: Normal range of motion.   Lymphadenopathy:      Cervical: Cervical adenopathy present.   Skin:     General: Skin is warm.      Capillary Refill: Capillary refill takes less than 2 seconds.      Findings: No rash.   Neurological:      General: No focal deficit present.      Mental Status: She is alert and oriented to person, place, and time.   Psychiatric:         Mood and Affect: Mood normal.         Behavior: Behavior normal.       Rapid Strep test is positive.    ASSESSMENT:    ICD-10-CM    1. Strep throat J02.0 amoxicillin (AMOXIL) 500 MG capsule   2. Acute pharyngitis, unspecified etiology J02.9 RAPID STREP SCREEN     PLAN:   Patient Instructions   Antibiotics as directed.  Drink plenty of fluids and rest.  May use salt water gargles- about 8 oz warm water with about 1 teaspoon salt  Chloraseptic and Cepacol spray are over the counter medications that numb the throat.  Over the counter pain relievers such as tylenol or ibuprofen may be used as needed.   Honey lemon tea helps to soothe the throat. \"Throat Coat\" tea is soothing as well.  Change toothbrush after 24 hours of antibiotics (may soak in 3-6% hydrogen peroxide)  Will be contagious for 24 hours after starting antibiotic  May return to school//work/activities 24 hours after antibiotics are started.  Wash hands frequently and do not share beverages.  Please follow up with primary care provider if symptoms are not improving, worsening or new symptoms or for any adverse reactions to " medications.    Follow up with primary care provider with any problems, questions or concerns or if symptoms worsen or fail to improve. Patient agreed to plan and verbalized understanding.    BRITTANI Brizuela-BC  [facility] [place]

## 2020-02-18 NOTE — PROGRESS NOTES
Subjective     Brenda Navarrete is a 24 year old female who presents to clinic today for the following health issues:    History of Present Illness        She eats 2-3 servings of fruits and vegetables daily.She consumes 0 sweetened beverage(s) daily.She exercises with enough effort to increase her heart rate 30 to 60 minutes per day.  She exercises with enough effort to increase her heart rate 3 or less days per week.   She is taking medications regularly.     Concern - right ear pressure.   Onset: for the last week to week and a half.     Description:   Constant pressure, she can relieve it throughout the day. Below her earlobe and her earlobe she can feel liquid or something moving around. Her hearing is muffled when she is trying to talk. She does wear a headset at work but does cover her ear not inside. Doesn't really use ear buds at all. She does say she is a little congested, runny nose. Denies any drainage or fevers.       Intensity: 3-4/10, no pain but more discomfort.     Therapies Tried and outcome: none    - no pain.  It started about 6 months ago but would come and go now in the last couple of weeks it seems to be constant.  No drainage.  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  No history of ear problems or surgeries.  No tinnitus.  No cold symptoms. No fevers or chills.     Current Outpatient Medications   Medication Sig Dispense Refill     ibuprofen (ADVIL,MOTRIN) 200 MG tablet Take 1-2 tablets by mouth every 6 hours as needed for other (take consistently for inflammation.). 100 tablet 0     levonorgestrel-ethinyl estradiol (SETLAKIN) 0.15-0.03 MG tablet Take 1 tablet by mouth daily 91 tablet 3     MULTIVITAMINS OR take 1 daily       ofloxacin 0.3 % OT otic solution Place 5 drops Into the left ear daily for 7 days 2 mL 0     Allergies   Allergen Reactions     No Known Allergies        Reviewed and updated as needed this visit by Provider  [place]   "Allergies  Meds  Problems  Med Hx  Surg Hx  Fam Hx         Review of Systems   ROS COMP: Constitutional, HEENT, cardiovascular, pulmonary, gi and gu systems are negative, except as otherwise noted.      Objective    /72   Pulse 90   Temp 99  F (37.2  C) (Temporal)   Resp 14   Ht 5' 6.73\" (1.695 m)   Wt 131 lb 9.6 oz (59.7 kg)   SpO2 99%   BMI 20.78 kg/m    Body mass index is 20.78 kg/m .  Physical Exam   GENERAL: healthy, alert and no distress  EYES: Eyes grossly normal to inspection, PERRL and conjunctivae and sclerae normal  HENT: normal cephalic/atraumatic, right ear: EAC cerumen impaction removed with ear lavage by MA TM is normal canal otherwise normal, left ear: EAC cerumen impaction partially removed with ear lavage by MA and curette by provider, there is mild cerumen against the inferior portion of TM on the floor of the EAC the TM otherwise appears normal mildly erythematous from lavage, there is irritation of the floor of the EAC from lavage without discharge, nose and mouth without ulcers or lesions, oropharynx clear and oral mucous membranes moist  NECK: no adenopathy, no asymmetry, masses, or scars and thyroid normal to palpation    Diagnostic Test Results:  Labs reviewed in Epic        Assessment & Plan       ICD-10-CM    1. Bilateral impacted cerumen H61.23 ofloxacin 0.3 % OT otic solution          Was able to successfully remove all cerumen from the right ear with ear lavage and appears normal.  The left has some retained dry cerumen present that is against the TM and on the floor, the majority was removed, the canal is aggravated from the lavage therefore elected to stop.  Given topical drops to prevent infection and help soften the remaining wax and she can try to rinse ear in the shower, advised no Q-tips, if symptoms are not improving then recommend follow-up in clinic.     Return in about 1 week (around 2/26/2020) for If not improving, sooner if worse or new concerns. "     Options for treatment and follow-up care were reviewed with the patient and/or guardian. Patient and/or guardian engaged in the decision making process and verbalized understanding of the options discussed and agreed with the final plan.     Shyanne Arrington PA-C  [facility]

## 2020-02-19 ENCOUNTER — OFFICE VISIT (OUTPATIENT)
Dept: FAMILY MEDICINE | Facility: OTHER | Age: 25
End: 2020-02-19
Payer: COMMERCIAL

## 2020-02-19 VITALS
SYSTOLIC BLOOD PRESSURE: 120 MMHG | HEIGHT: 67 IN | HEART RATE: 90 BPM | DIASTOLIC BLOOD PRESSURE: 72 MMHG | WEIGHT: 131.6 LBS | TEMPERATURE: 99 F | BODY MASS INDEX: 20.65 KG/M2 | OXYGEN SATURATION: 99 % | RESPIRATION RATE: 14 BRPM

## 2020-02-19 DIAGNOSIS — H61.23 BILATERAL IMPACTED CERUMEN: Primary | ICD-10-CM

## 2020-02-19 PROCEDURE — 69209 REMOVE IMPACTED EAR WAX UNI: CPT | Mod: 50 | Performed by: PHYSICIAN ASSISTANT

## 2020-02-19 PROCEDURE — 99213 OFFICE O/P EST LOW 20 MIN: CPT | Mod: 25 | Performed by: PHYSICIAN ASSISTANT

## 2020-02-19 RX ORDER — OFLOXACIN 3 MG/ML
5 SOLUTION AURICULAR (OTIC) DAILY
Qty: 2 ML | Refills: 0 | Status: SHIPPED | OUTPATIENT
Start: 2020-02-19 | End: 2020-06-09

## 2020-02-19 ASSESSMENT — MIFFLIN-ST. JEOR: SCORE: 1380.3

## 2020-06-09 ENCOUNTER — VIRTUAL VISIT (OUTPATIENT)
Dept: FAMILY MEDICINE | Facility: CLINIC | Age: 25
End: 2020-06-09
Payer: COMMERCIAL

## 2020-06-09 VITALS — WEIGHT: 128 LBS | BODY MASS INDEX: 20.21 KG/M2

## 2020-06-09 DIAGNOSIS — N92.0 SPOTTING: Primary | ICD-10-CM

## 2020-06-09 PROCEDURE — 99212 OFFICE O/P EST SF 10 MIN: CPT | Mod: GT | Performed by: OBSTETRICS & GYNECOLOGY

## 2020-06-09 NOTE — PROGRESS NOTES
"Brenda Navarrete is a 25 year old female who is being evaluated via a billable telephone visit.      The patient has been notified of following:     \"This telephone visit will be conducted via a call between you and your physician/provider. We have found that certain health care needs can be provided without the need for a physical exam.  This service lets us provide the care you need with a short phone conversation.  If a prescription is necessary we can send it directly to your pharmacy.  If lab work is needed we can place an order for that and you can then stop by our lab to have the test done at a later time.    Telephone visits are billed at different rates depending on your insurance coverage. During this emergency period, for some insurers they may be billed the same as an in-person visit.  Please reach out to your insurance provider with any questions.    If during the course of the call the physician/provider feels a telephone visit is not appropriate, you will not be charged for this service.\"    Patient has given verbal consent for Telephone visit?  Yes    What phone number would you like to be contacted at? [phone]    How would you like to obtain your AVS? Domo Adler     Brenda Navarrete is a 25 year old female who presents via phone visit today for the following health issues:    "

## 2020-06-09 NOTE — PROGRESS NOTES
"Brenda Navarrete is a 25 year old female who is being evaluated via a billable video visit.      The patient has been notified of following:     \"This video visit will be conducted via a call between you and your physician/provider. We have found that certain health care needs can be provided without the need for an in-person physical exam.  This service lets us provide the care you need with a video conversation.  If a prescription is necessary we can send it directly to your pharmacy.  If lab work is needed we can place an order for that and you can then stop by our lab to have the test done at a later time.    Video visits are billed at different rates depending on your insurance coverage.  Please reach out to your insurance provider with any questions.    If during the course of the call the physician/provider feels a video visit is not appropriate, you will not be charged for this service.\"    Patient has given verbal consent for Video visit? Yes    How would you like to obtain your AVS? Domo    Patient would like the video invitation sent by: Text to cell phone: [phone]    Will anyone else be joining your video visit? No    Subjective     Brenda Navarrete is a 25 year old female who presents today via video visit for the following health issues:    Video-Visit Details    Type of service:  Video Visit    Subjective: Brenda requested a videoconference consultation today because of concerns regarding spotting. Due to the current covid-19 coronavirus epidemic we are managing much of our patients' concerns remotely when possible.    She is on continuous birth control pill- for several years. Doesn't miss any doses. She has had light spotting for 4 days- wonders if this is normal    The past medical history and medications and allergies have been reviewed today by me.  .  Past Medical History:   Diagnosis Date     Lyme disease 11-02    also cat scratch disease     Allergies   Allergen Reactions     No Known " Allergies      Current Outpatient Medications   Medication Sig Dispense Refill     ibuprofen (ADVIL,MOTRIN) 200 MG tablet Take 1-2 tablets by mouth every 6 hours as needed for other (take consistently for inflammation.). 100 tablet 0     levonorgestrel-ethinyl estradiol (SETLAKIN) 0.15-0.03 MG tablet Take 1 tablet by mouth daily 91 tablet 3     MULTIVITAMINS OR take 1 daily         Objective: she looks well    Assessment/Plan:  Spotting on continuous seasonale- I reassured her that this is normal and if it continues we will order US to assess her endometrial lining. For now she is fine letting it go- it is getting less each day.    Followup: as needed.      Start of call: 1225  hours    Call ended: 1234   hours  Videoconference call duration was   9  minutes.     COY Pineda MD

## 2020-11-18 ENCOUNTER — OFFICE VISIT (OUTPATIENT)
Dept: FAMILY MEDICINE | Facility: CLINIC | Age: 25
End: 2020-11-18
Payer: COMMERCIAL

## 2020-11-18 VITALS
DIASTOLIC BLOOD PRESSURE: 88 MMHG | RESPIRATION RATE: 14 BRPM | TEMPERATURE: 97.6 F | SYSTOLIC BLOOD PRESSURE: 124 MMHG | BODY MASS INDEX: 21.31 KG/M2 | OXYGEN SATURATION: 98 % | HEART RATE: 113 BPM | WEIGHT: 135 LBS

## 2020-11-18 DIAGNOSIS — Z00.00 ROUTINE GENERAL MEDICAL EXAMINATION AT A HEALTH CARE FACILITY: Primary | ICD-10-CM

## 2020-11-18 DIAGNOSIS — N92.6 IRREGULAR MENSTRUAL CYCLE: ICD-10-CM

## 2020-11-18 PROCEDURE — G0145 SCR C/V CYTO,THINLAYER,RESCR: HCPCS | Performed by: FAMILY MEDICINE

## 2020-11-18 PROCEDURE — 99395 PREV VISIT EST AGE 18-39: CPT | Performed by: FAMILY MEDICINE

## 2020-11-18 RX ORDER — LEVONORGESTREL AND ETHINYL ESTRADIOL 0.15-0.03
1 KIT ORAL DAILY
Qty: 91 TABLET | Refills: 3 | Status: SHIPPED | OUTPATIENT
Start: 2020-11-18 | End: 2021-11-18

## 2020-11-18 ASSESSMENT — ENCOUNTER SYMPTOMS
NERVOUS/ANXIOUS: 0
DYSURIA: 0
SORE THROAT: 0
BREAST MASS: 0
CONSTIPATION: 0
ABDOMINAL PAIN: 0
COUGH: 0
HEARTBURN: 0
EYE PAIN: 0
CHILLS: 0
PARESTHESIAS: 0
HEMATOCHEZIA: 0
MYALGIAS: 0
ARTHRALGIAS: 0
HEMATURIA: 0
PALPITATIONS: 0
DIARRHEA: 0
FREQUENCY: 0
NAUSEA: 0
WEAKNESS: 0
FEVER: 0
HEADACHES: 0
DIZZINESS: 0
SHORTNESS OF BREATH: 0
JOINT SWELLING: 0

## 2020-11-18 ASSESSMENT — PAIN SCALES - GENERAL: PAINLEVEL: NO PAIN (0)

## 2020-11-18 NOTE — PROGRESS NOTES
SUBJECTIVE:   CC: Brenda Navarrete is an 25 year old woman who presents for preventive health visit.       Patient has been advised of split billing requirements and indicates understanding: Yes   Answers for HPI/ROS submitted by the patient on 11/18/2020   Annual Exam:  Frequency of exercise:: 2-3 days/week  Getting at least 3 servings of Calcium per day:: Yes  Diet:: Regular (no restrictions)  Taking medications regularly:: Yes  Medication side effects:: None  Bi-annual eye exam:: Yes  Dental care twice a year:: Yes  Sleep apnea or symptoms of sleep apnea:: None  abdominal pain: No  Blood in stool: No  Blood in urine: No  chest pain: No  chills: No  congestion: No  constipation: No  cough: No  diarrhea: No  dizziness: No  ear pain: No  eye pain: No  nervous/anxious: No  fever: No  frequency: No  genital sores: No  headaches: No  hearing loss: No  heartburn: No  arthralgias: No  joint swelling: No  peripheral edema: No  mood changes: No  myalgias: No  nausea: No  dysuria: No  palpitations: No  Skin sensation changes: No  sore throat: No  urgency: No  rash: No  shortness of breath: No  visual disturbance: No  weakness: No  pelvic pain: No  vaginal bleeding: No  vaginal discharge: No  tenderness: No  breast mass: No  breast discharge: No  Additional concerns today:: No  Duration of exercise:: 15-30 minutes      Brenda is here today for her general physical with no specific concerns today.  She needs a refill for birth control pill to regulate her menstrual cycle.  She been on it for a while and has been working well.  No side effect.  She takes the pills continuously and therefore does not have her menstrual cycle.  She been doing for years.  Does not smoke.  No personal or family history of blood clot.  No concern of STD and has no history of abnormal pap smear.  Her last pap was 3 years ago and it was normal with negative high risk HPV.  No headache or dizziness.  No acute visual change. No running nose, nasal  congestion, coughing, fever or chill. No CP/SOB. No N/V/D/C. No problem with urination.  No abdominal pain.  No leg swelling or pain.  Exercising regularly, couple times a week.   No drugs, alcohol or tobacco use.  No problem with sleeping - no depression or anxiety.  Feels safe at home and at work.  Lives with her . Her last physical exam was about a year ago and it was normal; no major medical care or procedure done since then. No other concern today.      Today's PHQ-2 Score:   PHQ-2 ( 1999 [employer]) 11/18/2020 2/19/2020   Q1: Little interest or pleasure in doing things 0 0   Q2: Feeling down, depressed or hopeless 0 0   PHQ-2 Score 0 0   Q1: Little interest or pleasure in doing things Not at all Not at all   Q2: Feeling down, depressed or hopeless Not at all Not at all   PHQ-2 Score 0 0       Abuse: Current or Past(Physical, Sexual or Emotional)- No  Do you feel safe in your environment? Yes        Social History     Tobacco Use     Smoking status: Never Smoker     Smokeless tobacco: Never Used     Tobacco comment: NO NICOTINE EXPOSURE @ HOME   Substance Use Topics     Alcohol use: No     If you drink alcohol do you typically have >3 drinks per day or >7 drinks per week? Not Applicable                     Reviewed orders with patient.  Reviewed health maintenance and updated orders accordingly - Yes  Patient Active Problem List   Diagnosis     Irregular menstrual cycle     Past Surgical History:   Procedure Laterality Date     EXCISE GANGLION WRIST  2/8/2013    Procedure: EXCISE GANGLION WRIST;  excision left wrist dorsal cyst;  Surgeon: Karina Webb MD;  Location: [facility]     EXCISE GANGLION WRIST Right 11/26/2014    Procedure: EXCISE GANGLION WRIST;  Surgeon: Danis Hodge MD;  Location:  OR       Social History     Tobacco Use     Smoking status: Never Smoker     Smokeless tobacco: Never Used     Tobacco comment: NO NICOTINE EXPOSURE @ HOME   Substance Use Topics     Alcohol use: No      Family History   Problem Relation Age of Onset     Diabetes Paternal Grandfather      Heart Disease Paternal Grandfather          of MI at 71     Hypertension Father      Hyperlipidemia Father      No Known Problems Mother      No Known Problems Maternal Grandmother      Asthma Paternal Grandmother      No Known Problems Sister          Current Outpatient Medications   Medication Sig Dispense Refill     levonorgestrel-ethinyl estradiol (SETLAKIN) 0.15-0.03 MG tablet Take 1 tablet by mouth daily 91 tablet 3     MULTIVITAMINS OR take 1 daily       Allergies   Allergen Reactions     No Known Allergies        Mammogram not appropriate for this patient based on age.    Pertinent mammograms are reviewed under the imaging tab.  History of abnormal Pap smear: NO - age 21-29 PAP every 3 years recommended  PAP / HPV Latest Ref Rng & Units 2017   PAP - NIL   HPV 16 DNA NEG:Negative Negative   HPV 18 DNA NEG:Negative Negative   OTHER HR HPV NEG:Negative Negative     Reviewed and updated as needed this visit by clinical staff  Tobacco  Allergies  Meds      Soc Hx        Reviewed and updated as needed this visit by Provider                Past Medical History:   Diagnosis Date     Cat-scratch disease 2004     Lyme disease     also cat scratch disease      Past Surgical History:   Procedure Laterality Date     EXCISE GANGLION WRIST  2013    Procedure: EXCISE GANGLION WRIST;  excision left wrist dorsal cyst;  Surgeon: Karina Webb MD;  [facility]     EXCISE GANGLION WRIST Right 2014    Procedure: EXCISE GANGLION WRIST;  Surgeon: Danis Hodge MD;  Location: [facility] OR     OB History   No obstetric history on file.       ROS:  Please see subjective otherwise the complete review system was negative    OBJECTIVE:   /88   Pulse 113   Temp 97.6  F (36.4  C) (Temporal)   Resp 14   Wt 61.2 kg (135 lb)   SpO2 98%   BMI 21.31 kg/m    EXAM:  GENERAL: healthy, alert and no distress.   Speaking in full sentences.  EYES: Eyes grossly normal to inspection, PERRL and conjunctivae and sclerae normal.  No nystagmus.  All 4 visual fields intact.  HENT: ear canals and TM's normal.  Nares are non-congested. Oropharynx is pink and moist. No tender with palpation to the sinuses.   NECK: no adenopathy, supple, no lymphadenopathy or thyromegaly.  No tender with palpation to the cervical spine or its paraspinous muscle.  RESP: lungs clear to auscultation - no rales, rhonchi or wheezes.  Good respiratory effort throughout.  BREAST: Offered but declined.  She has no concern about it.  CV: regular rate and rhythm, no murmur  ABDOMEN: soft, nontender, nondistended, no palpable masses organomegaly with normal culture.  No CVA tenderness.   (female): normal female external genitalia, normal urethral meatus, vaginal mucosa, normal cervix/adnexa/uterus without masses or discharge. Pending for pap and HPV.  MS: no gross musculoskeletal defects noted, no edema. All joints are in the normal range of motion and 4 extremities were equally in strength. Hips, knees, ankles, shoulders, elbows, wrists exams were normal. Fine motor skills of the fingers are intact. Back is straight, no tender with palpation to the spine.  SKIN: no suspicious lesions or rashes  NEURO: Normal strength and tone, mentation intact and speech normal.  Cranial nerve II through XII intact.  DTRs +2 throughout.  No focal neurological deficit.  PSYCH: mentation appears normal, affect normal/bright.  Thoughts intact, suicidal/homicidal ideation.  No hallucination.      Diagnostic Test Results:  Labs reviewed in Epic  Results for orders placed or performed in visit on 11/18/20   Pap imaged thin layer screen only - recommended age 21 - 24 years     Status: None   Result Value Ref Range    PAP NIL     Copath Report         Patient Name: CAN SIMMONS  MR#: [id number]  Specimen #: [id number]  Collected: 11/18/2020  Received: 11/19/2020  Reported:  11/20/2020 10:04  Ordering Phy(s): DOTTY HOLBROOK MAI    For improved result formatting, select 'View Enhanced Report Format' under   Linked Documents section.    SPECIMEN/STAIN PROCESS:  Pap imaged thin layer prep screening (Surepath, FocalPoint with guided   screening)       Pap-Cyto x 1    SOURCE: Cervical, endocervical  ----------------------------------------------------------------   Pap imaged thin layer prep screening (Surepath, FocalPoint with guided   screening)  SPECIMEN ADEQUACY:  Satisfactory for evaluation.  -Transformation zone component present.    CYTOLOGIC INTERPRETATION:    Negative for intraepithelial lesion or malignancy    Electronically signed out by:  JO Mars (ASCP)    CLINICAL HISTORY:    Birth control, A previous normal pap  Date of Last Pap: 9/20/2017,    Papanicolaou Test Limitations:  Cervical cytology is a screening test wit h   limited sensitivity; regular  screening is critical for cancer prevention; Pap tests are primarily   effective for the diagnosis/prevention of  squamous cell carcinoma, not adenocarcinomas or other cancers.    COLLECTION SITE:  Client:  [facility]  Location: [facility] ([facility])    The technical component of this testing was completed at the [facility], with the professional component performed   at the [facility], [address] [place] [id number] ([phone])           ASSESSMENT/PLAN:   1. Routine general medical examination at a health care facility  Generally Brenda is healthy and is doing well.  Discussed with her about age appropriate preventive care, including safety issue, STD prevention, BC options, and healthy lifestyle modification.  UTD for immunization.  Pap smear obtained; declined to be screened for GC or other STD due to low risk.  Healthy diet and continue with daily/regular exercise encouraged.  Good  "sleeping hygiene discussed.  All of her questions were answered.  Lab today: as ordered below.     - Pap imaged thin layer screen only - recommended age 21 - 24 years    2. Irregular menstrual cycle  Controlled with the birth control pills.  I recommend her to consider breakthrough bleeding once every 4 months by taking the placebo pill for a week.    - levonorgestrel-ethinyl estradiol (SETLAKIN) 0.15-0.03 MG tablet; Take 1 tablet by mouth daily  Dispense: 91 tablet; Refill: 3    Patient has been advised of split billing requirements and indicates understanding: No  COUNSELING:   Reviewed preventive health counseling, as reflected in patient instructions       Regular exercise       Healthy diet/nutrition       Contraception       Family planning       Folic Acid       Safe sex practices/STD prevention       Advance Care Planning    Estimated body mass index is 21.31 kg/m  as calculated from the following:    Height as of 2/19/20: 1.695 m (5' 6.73\").    Weight as of this encounter: 61.2 kg (135 lb).        She reports that she has never smoked. She has never used smokeless tobacco.      Counseling Resources:  ATP IV Guidelines  Pooled Cohorts Equation Calculator  Breast Cancer Risk Calculator  BRCA-Related Cancer Risk Assessment: FHS-7 Tool  FRAX Risk Assessment  ICSI Preventive Guidelines  Dietary Guidelines for Americans, 2010  USDA's MyPlate  ASA Prophylaxis  Lung CA Screening    Petra Majano Mai, MD  [facility]  "

## 2020-11-20 LAB
COPATH REPORT: NORMAL
PAP: NORMAL

## 2021-01-10 ENCOUNTER — HEALTH MAINTENANCE LETTER (OUTPATIENT)
Age: 26
End: 2021-01-10

## 2021-05-08 ENCOUNTER — APPOINTMENT (OUTPATIENT)
Dept: URGENT CARE | Facility: CLINIC | Age: 26
End: 2021-05-08
Payer: COMMERCIAL

## 2021-05-24 NOTE — TELEPHONE ENCOUNTER
.Patient has been evaluated by anesthesia pre-procedure. Patient alert and oriented. Vital signs will not be charted by the Endoscopy nurse. All vitals, airway, and loc are monitored by anesthesia staff throughout procedure. .Endoscope will be pre-cleaned at bedside immediately following procedure by ERAN. [place]      Last Written Prescription Date: 3/20/17  Last Fill Quantity: 91,  # refills: 1   Last Office Visit with G, P or [facility] prescribing provider: 9/27/16

## 2021-10-19 DIAGNOSIS — R21 RASH AND NONSPECIFIC SKIN ERUPTION: Primary | ICD-10-CM

## 2021-10-19 PROCEDURE — 99213 OFFICE O/P EST LOW 20 MIN: CPT | Performed by: OBSTETRICS & GYNECOLOGY

## 2021-10-19 RX ORDER — PREDNISONE 20 MG/1
60 TABLET ORAL DAILY
Qty: 21 TABLET | Refills: 0 | Status: SHIPPED | OUTPATIENT
Start: 2021-10-19 | End: 2021-11-18

## 2021-10-19 NOTE — PROGRESS NOTES
She developed a rash on her arms and legs after sleeping on hotel linen and it was only the areas that were uncovered on her skin.  It is slightly itchy.  It is fairly diffuse.  She wants to try a few days of prednisone to see if it would help.See rx for  prednisone to help treat the rash-I suspect it is a contact dermatitis from something in the detergent in her bed sheets.. I explained that using steroids does come with some risks, most of them with long term use, such as immune suppression, gastric irritation, weight gain, but with a short term course of use the risks should be minimal and in this case are justified.  COY Pineda MD

## 2021-10-23 ENCOUNTER — HEALTH MAINTENANCE LETTER (OUTPATIENT)
Age: 26
End: 2021-10-23

## 2021-11-17 ASSESSMENT — ENCOUNTER SYMPTOMS
NERVOUS/ANXIOUS: 0
CONSTIPATION: 0
CHILLS: 0
ARTHRALGIAS: 0
WEAKNESS: 0
HEARTBURN: 0
PALPITATIONS: 0
BREAST MASS: 0
HEADACHES: 0
PARESTHESIAS: 0
HEMATURIA: 0
MYALGIAS: 0
FREQUENCY: 0
NAUSEA: 0
DYSURIA: 0
SHORTNESS OF BREATH: 0
JOINT SWELLING: 0
COUGH: 0
DIZZINESS: 0
HEMATOCHEZIA: 0
ABDOMINAL PAIN: 0
FEVER: 0
EYE PAIN: 0
SORE THROAT: 0
DIARRHEA: 0

## 2021-11-18 ENCOUNTER — OFFICE VISIT (OUTPATIENT)
Dept: FAMILY MEDICINE | Facility: CLINIC | Age: 26
End: 2021-11-18
Payer: COMMERCIAL

## 2021-11-18 VITALS
HEIGHT: 67 IN | OXYGEN SATURATION: 100 % | BODY MASS INDEX: 22.79 KG/M2 | SYSTOLIC BLOOD PRESSURE: 124 MMHG | HEART RATE: 82 BPM | TEMPERATURE: 98.2 F | RESPIRATION RATE: 20 BRPM | DIASTOLIC BLOOD PRESSURE: 80 MMHG | WEIGHT: 145.2 LBS

## 2021-11-18 DIAGNOSIS — Z30.41 ENCOUNTER FOR SURVEILLANCE OF CONTRACEPTIVE PILLS: ICD-10-CM

## 2021-11-18 DIAGNOSIS — N92.6 IRREGULAR MENSTRUAL CYCLE: ICD-10-CM

## 2021-11-18 DIAGNOSIS — Z00.00 ROUTINE GENERAL MEDICAL EXAMINATION AT A HEALTH CARE FACILITY: Primary | ICD-10-CM

## 2021-11-18 PROCEDURE — 99395 PREV VISIT EST AGE 18-39: CPT | Performed by: FAMILY MEDICINE

## 2021-11-18 RX ORDER — LEVONORGESTREL AND ETHINYL ESTRADIOL 0.15-0.03
1 KIT ORAL DAILY
Qty: 91 TABLET | Refills: 3 | Status: SHIPPED | OUTPATIENT
Start: 2021-11-18 | End: 2022-06-26

## 2021-11-18 ASSESSMENT — ENCOUNTER SYMPTOMS
DIARRHEA: 0
FEVER: 0
NAUSEA: 0
FREQUENCY: 0
HEADACHES: 0
CHILLS: 0
SHORTNESS OF BREATH: 0
NERVOUS/ANXIOUS: 0
PALPITATIONS: 0
SORE THROAT: 0
PARESTHESIAS: 0
EYE PAIN: 0
DYSURIA: 0
HEMATOCHEZIA: 0
ARTHRALGIAS: 0
MYALGIAS: 0
ABDOMINAL PAIN: 0
HEMATURIA: 0
COUGH: 0
BREAST MASS: 0
DIZZINESS: 0
CONSTIPATION: 0
HEARTBURN: 0
WEAKNESS: 0
JOINT SWELLING: 0

## 2021-11-18 ASSESSMENT — MIFFLIN-ST. JEOR: SCORE: 1431.99

## 2021-11-18 ASSESSMENT — PAIN SCALES - GENERAL: PAINLEVEL: NO PAIN (0)

## 2021-11-18 NOTE — PATIENT INSTRUCTIONS
Patient Education     Prevention Guidelines, Women Ages 18 to 39  Screening tests and vaccines are an important part of managing your health. A screening test is done to find possible disorders or diseases in people who don't have any symptoms. The goal is to find a disease early so lifestyle changes can be made and you can be watched more closely to reduce the risk of disease, or to detect it early enough to treat it most effectively. Screening tests are not considered diagnostic, but are used to determine if more testing is needed. Health counseling is essential, too. Below are guidelines for these, for women ages 18 to 39. Talk with your healthcare provider to make sure you re up-to-date on what you need.   Screening Who needs it How often   Alcohol misuse All women in this age group  At routine exams   Blood pressure All women in this age group  Yearly checkup if your blood pressure is normal   Normal blood pressure is less than 120/80 mm Hg   If your blood pressure reading is higher than normal, follow the advice of your healthcare provider    Breast cancer All women in this age group should talk with their healthcare providers about the need for clinical breast exams (CBE)1  Clinical breast exam every 3 years1    Cervical cancer Women ages 21 and older  Women between ages 21 and 29 should have a Pap test every 3 years; women between ages 30 and 65 are advised to have a Pap test plus an HPV test every 5 years    Chlamydia Sexually active women ages 24 and younger, and women at increased risk for infection  Every 3 years if you're at risk or have symptoms    Depression All women in this age group  At routine exams   Diabetes mellitus, type 2  Adults with no symptoms who are overweight or obese and have 1 or more other risk factors for diabetes  At least every 3 years. Also, testing for diabetes during pregnancy after the 24th week.     Gonorrhea Sexually active women at increased risk for infection  At routine  exams   Hepatitis C Anyone at increased risk  At routine exams   HIV All women At routine exams3     Obesity All women in this age group  At routine exams   Syphilis Women at increased risk for infection should talk with their healthcare provider  At routine exams   Tuberculosis Women at increased risk for infection should talk with their healthcare provider  Ask your healthcare provider    Vision All women in this age group  At least 1 complete exam in your 20s, and 2 in your 30s    Vaccine Who needs it How often   Chickenpox (varicella)  All women in this age group who have no record of this infection or vaccine  2 doses; the second dose should be given 4 to 8 weeks after the first dose    Hepatitis A Women at increased risk for infection should talk with their healthcare provider  2 doses given at least 6 months apart    Hepatitis B Women at increased risk for infection should talk with their healthcare provider  3 doses over 6 months; second dose should be given 1 month after the first dose; the third dose should be given at least 2 months after the second dose and at least 4 months after the first dose    Haemophilus influenzae  Type B (HIB)  Women at increased risk for infection should talk with their healthcare provider  1 to 3 doses   Human papillomavirus (HPV)  All women in this age group up to age 26  3 doses; the second dose should be given 1 to 2 months after the first dose and the third dose given 6 months after the first dose    Influenza (flu) All women in this age group  Once a year   Measles, mumps, rubella (MMR)  All women in this age group who have no record of these infections or vaccines  1 or 2 doses   Meningococcal Women at increased risk for infection should talk with their healthcare provider  1 or more doses   Pneumococcal conjugate vaccine (PCV13) and pneumococcal polysaccharide vaccine (PPSV23)  Women at increased risk for infection should talk with their healthcare provider  PCV13: 1  dose ages 19 to 65 (protects against 13 types of pneumococcal bacteria)   PPSV23: 1 to 2 doses through age 64, or 1 dose at 65 or older (protects against 23 types of pneumococcal bacteria)    Tetanus/diphtheria/pertussis (Td/Tdap) booster All women in this age group  Td every 10 years, or a one-time dose of Tdap instead of a Td booster after age 18, then Td every 10 years    Counseling Who needs it How often   BRCA gene mutation testing for breast and ovarian cancer susceptibility  Women with increased risk for having gene mutation  When your risk is known    Breast cancer and chemoprevention  Women at high risk for breast cancer  When your risk is known    Diet and exercise Women who are overweight or obese  When diagnosed, and then at routine exams    Domestic violence Women at the age in which they are able to have children  At routine exams   Sexually transmitted infection prevention  Women who are sexually active  At routine exams   Skin cancer Prevention of skin cancer in fair-skinned adults  At routine exams   Use of tobacco and the health effects it can cause  All women in this age group  Every visit   1 According to the ACS, women ages 20 to 39 years should have a clinical breast exam (CBE) as part of their routine health exam every 3 years. Breast self-exams are an option for women starting in their 20s. But the U.S. Preventive Services Task Force (USPSTF) does not recommend CBE.   2 Those who are 18 years old and not up-to-date on their childhood vaccines should get all appropriate catch-up vaccines recommended by the CDC.   3 The USPSTF recommends that all people ages 15 to 65 years be screened for HIV and those younger or older people at increased risk. The CDC recommends that everyone between the ages of 13 and 64 get tested for HIV at least once as part of routine health care.   [place] last reviewed this educational content on 10/1/2017    6174-0281 [employer]. All rights reserved.  This information is not intended as a substitute for professional medical care. Always follow your healthcare professional's instructions.

## 2021-11-18 NOTE — PROGRESS NOTES
SUBJECTIVE:   CC: Brenda Navarrete is an 26 year old woman who presents for preventive health visit.       Patient has been advised of split billing requirements and indicates understanding: Yes  Healthy Habits:     Getting at least 3 servings of Calcium per day:  Yes    Bi-annual eye exam:  Yes    Dental care twice a year:  Yes    Sleep apnea or symptoms of sleep apnea:  None    Diet:  Regular (no restrictions)    Frequency of exercise:  2-3 days/week    Duration of exercise:  15-30 minutes    Taking medications regularly:  Yes    Medication side effects:  None    PHQ-2 Total Score: 0    Additional concerns today:  No        Today's PHQ-2 Score:   PHQ-2 ( 1999 Pfizer) 11/17/2021   Q1: Little interest or pleasure in doing things 0   Q2: Feeling down, depressed or hopeless 0   PHQ-2 Score 0   PHQ-2 Total Score (12-17 Years)- Positive if 3 or more points; Administer PHQ-A if positive -   Q1: Little interest or pleasure in doing things Not at all   Q2: Feeling down, depressed or hopeless Not at all   PHQ-2 Score 0       Abuse: Current or Past (Physical, Sexual or Emotional) - No  Do you feel safe in your environment? Yes    Have you ever done Advance Care Planning? (For example, a Health Directive, POLST, or a discussion with a medical provider or your loved ones about your wishes): Yes, patient states has an Advance Care Planning document and will bring a copy to the clinic.    Social History     Tobacco Use     Smoking status: Never Smoker     Smokeless tobacco: Never Used     Tobacco comment: NO NICOTINE EXPOSURE @ HOME   Substance Use Topics     Alcohol use: No     If you drink alcohol do you typically have >3 drinks per day or >7 drinks per week? No    Alcohol Use 11/17/2021   Prescreen: >3 drinks/day or >7 drinks/week? No       Reviewed orders with patient.  Reviewed health maintenance and updated orders accordingly - Yes  BP Readings from Last 3 Encounters:   11/18/21 124/80   11/18/20 124/88   02/19/20  120/72    Wt Readings from Last 3 Encounters:   11/18/21 65.9 kg (145 lb 3.2 oz)   11/18/20 61.2 kg (135 lb)   06/09/20 58.1 kg (128 lb)                    Breast Cancer Screening:    Breast CA Risk Assessment (FHS-7) 11/17/2021   Do you have a family history of breast, colon, or ovarian cancer? No / Unknown         Patient under 40 years of age: Routine Mammogram Screening not recommended.   Pertinent mammograms are reviewed under the imaging tab.    History of abnormal Pap smear: NO - age 21-29 PAP every 3 years recommended  PAP / HPV Latest Ref Rng & Units 11/18/2020 9/20/2017   PAP (Historical) - NIL NIL   HPV16 NEG:Negative - Negative   HPV18 NEG:Negative - Negative   HRHPV NEG:Negative - Negative     Reviewed and updated as needed this visit by clinical staff                Reviewed and updated as needed this visit by Provider               Past Medical History:   Diagnosis Date     Cat-scratch disease 4/20/2004     Lyme disease 11-02    also cat scratch disease      Past Surgical History:   Procedure Laterality Date     EXCISE GANGLION WRIST  2/8/2013    Procedure: EXCISE GANGLION WRIST;  excision left wrist dorsal cyst;  Surgeon: Karina Webb MD;  Location: [facility]     EXCISE GANGLION WRIST Right 11/26/2014    Procedure: EXCISE GANGLION WRIST;  Surgeon: Danis Hodge MD;  Location: [facility] OR       Review of Systems   Constitutional: Negative for chills and fever.   HENT: Negative for congestion, ear pain, hearing loss and sore throat.    Eyes: Negative for pain and visual disturbance.   Respiratory: Negative for cough and shortness of breath.    Cardiovascular: Negative for chest pain, palpitations and peripheral edema.   Gastrointestinal: Negative for abdominal pain, constipation, diarrhea, heartburn, hematochezia and nausea.   Breasts:  Negative for tenderness, breast mass and discharge.   Genitourinary: Negative for dysuria, frequency, genital sores, hematuria, pelvic pain, urgency, vaginal  "bleeding and vaginal discharge.   Musculoskeletal: Negative for arthralgias, joint swelling and myalgias.   Skin: Negative for rash.   Neurological: Negative for dizziness, weakness, headaches and paresthesias.   Psychiatric/Behavioral: Negative for mood changes. The patient is not nervous/anxious.         OBJECTIVE:   There were no vitals taken for this visit.  Physical Exam  GENERAL: healthy, alert and no distress  EYES: Eyes grossly normal to inspection, PERRL and conjunctivae and sclerae normal  NECK: no adenopathy, no asymmetry, masses, or scars and thyroid normal to palpation  RESP: lungs clear to auscultation - no rales, rhonchi or wheezes  BREAST: declined  CV: regular rate and rhythm, normal S1 S2, no S3 or S4, no murmur, click or rub, no peripheral edema and peripheral pulses strong  ABDOMEN: soft, nontender, no hepatosplenomegaly, no masses and bowel sounds normal  MS: no gross musculoskeletal defects noted, no edema  SKIN: no suspicious lesions or rashes  NEURO: Normal strength and tone, mentation intact and speech normal  PSYCH: mentation appears normal, affect normal/bright    ASSESSMENT/PLAN:   Brenda was seen today for physical.    Diagnoses and all orders for this visit:    Routine general medical examination at a health care facility    Irregular menstrual cycle  -     levonorgestrel-ethinyl estradiol (SETLAKIN) 0.15-0.03 MG tablet; Take 1 tablet by mouth daily    Encounter for surveillance of contraceptive pills      Discussed preconception counseling    Patient has been advised of spl  it billing requirements and indicates understanding: Yes  COUNSELING:  Reviewed preventive health counseling, as reflected in patient instructions    Estimated body mass index is 21.31 kg/m  as calculated from the following:    Height as of 2/19/20: 1.695 m (5' 6.73\").    Weight as of 11/18/20: 61.2 kg (135 lb).        She reports that she has never smoked. She has never used smokeless tobacco.      Counseling " Resources:  ATP IV Guidelines  Pooled Cohorts Equation Calculator  Breast Cancer Risk Calculator  BRCA-Related Cancer Risk Assessment: FHS-7 Tool  FRAX Risk Assessment  ICSI Preventive Guidelines  Dietary Guidelines for Americans, 2010  USDA's MyPlate  ASA Prophylaxis  Lung CA Screening    Karina Macedo MD  [facility]

## 2021-11-18 NOTE — NURSING NOTE
"Chief Complaint   Patient presents with     Physical       Initial /80 (BP Location: Right arm, Patient Position: Sitting, Cuff Size: Adult Regular)   Pulse 82   Temp 98.2  F (36.8  C) (Tympanic)   Resp 20   Ht 1.695 m (5' 6.73\")   Wt 65.9 kg (145 lb 3.2 oz)   SpO2 100%   BMI 22.92 kg/m   Estimated body mass index is 22.92 kg/m  as calculated from the following:    Height as of this encounter: 1.695 m (5' 6.73\").    Weight as of this encounter: 65.9 kg (145 lb 3.2 oz).    Patient presents to the clinic using No DME    Health Maintenance that is potentially due pending provider review:  NONE    n/a    Is there anyone who you would like to be able to receive your results? No  If yes have patient fill out PORFIRIO    "

## 2022-06-24 ENCOUNTER — APPOINTMENT (OUTPATIENT)
Dept: OBGYN | Facility: CLINIC | Age: 27
End: 2022-06-24
Payer: COMMERCIAL

## 2022-06-26 ENCOUNTER — PRENATAL OFFICE VISIT (OUTPATIENT)
Dept: OBGYN | Facility: CLINIC | Age: 27
End: 2022-06-26
Payer: COMMERCIAL

## 2022-06-26 DIAGNOSIS — Z34.00 PRENATAL CARE, FIRST PREGNANCY: ICD-10-CM

## 2022-06-26 PROCEDURE — 99207 PR NO CHARGE NURSE ONLY: CPT | Performed by: OBSTETRICS & GYNECOLOGY

## 2022-06-26 RX ORDER — PRENATAL VIT/IRON FUM/FOLIC AC 27MG-0.8MG
1 TABLET ORAL DAILY
COMMUNITY
Start: 2022-01-01

## 2022-06-26 NOTE — PROGRESS NOTES
Lifestyle and nutrition teaching completed   Treva Burnette OB Intake Nurse    Patient supplied answers from flow sheet for:  Prenatal OB Questionnaire.

## 2022-07-21 ENCOUNTER — PRENATAL OFFICE VISIT (OUTPATIENT)
Dept: OBGYN | Facility: CLINIC | Age: 27
End: 2022-07-21
Payer: COMMERCIAL

## 2022-07-21 VITALS
SYSTOLIC BLOOD PRESSURE: 135 MMHG | BODY MASS INDEX: 22.44 KG/M2 | HEART RATE: 108 BPM | HEIGHT: 67 IN | DIASTOLIC BLOOD PRESSURE: 92 MMHG | RESPIRATION RATE: 16 BRPM | WEIGHT: 143 LBS | TEMPERATURE: 98 F

## 2022-07-21 DIAGNOSIS — Z34.01 ENCOUNTER FOR PRENATAL CARE IN FIRST TRIMESTER OF FIRST PREGNANCY: Primary | ICD-10-CM

## 2022-07-21 LAB
ABO/RH(D): NORMAL
ALBUMIN UR-MCNC: NEGATIVE MG/DL
ALT SERPL W P-5'-P-CCNC: 45 U/L (ref 0–50)
ANTIBODY SCREEN: NEGATIVE
APPEARANCE UR: CLEAR
AST SERPL W P-5'-P-CCNC: 26 U/L (ref 0–45)
BACTERIA #/AREA URNS HPF: ABNORMAL /HPF
BILIRUB UR QL STRIP: NEGATIVE
COLOR UR AUTO: YELLOW
CREAT SERPL-MCNC: 0.51 MG/DL (ref 0.52–1.04)
CREAT UR-MCNC: 21 MG/DL
ERYTHROCYTE [DISTWIDTH] IN BLOOD BY AUTOMATED COUNT: 11.7 % (ref 10–15)
GFR SERPL CREATININE-BSD FRML MDRD: >90 ML/MIN/1.73M2
GLUCOSE UR STRIP-MCNC: NEGATIVE MG/DL
HCT VFR BLD AUTO: 38.1 % (ref 35–47)
HGB BLD-MCNC: 13 G/DL (ref 11.7–15.7)
HGB UR QL STRIP: ABNORMAL
KETONES UR STRIP-MCNC: NEGATIVE MG/DL
LEUKOCYTE ESTERASE UR QL STRIP: NEGATIVE
MCH RBC QN AUTO: 31 PG (ref 26.5–33)
MCHC RBC AUTO-ENTMCNC: 34.1 G/DL (ref 31.5–36.5)
MCV RBC AUTO: 91 FL (ref 78–100)
NITRATE UR QL: NEGATIVE
PH UR STRIP: 5.5 [PH] (ref 5–7)
PLATELET # BLD AUTO: 310 10E3/UL (ref 150–450)
PROT UR-MCNC: <0.05 G/L
PROT/CREAT 24H UR: NORMAL MG/G{CREAT}
RBC # BLD AUTO: 4.19 10E6/UL (ref 3.8–5.2)
RBC #/AREA URNS AUTO: ABNORMAL /HPF
SP GR UR STRIP: 1.01 (ref 1–1.03)
SPECIMEN EXPIRATION DATE: NORMAL
SQUAMOUS #/AREA URNS AUTO: ABNORMAL /LPF
URATE SERPL-MCNC: 2.7 MG/DL (ref 2.6–6)
UROBILINOGEN UR STRIP-ACNC: 0.2 E.U./DL
WBC # BLD AUTO: 10.4 10E3/UL (ref 4–11)
WBC #/AREA URNS AUTO: ABNORMAL /HPF

## 2022-07-21 PROCEDURE — 86803 HEPATITIS C AB TEST: CPT | Performed by: OBSTETRICS & GYNECOLOGY

## 2022-07-21 PROCEDURE — 87340 HEPATITIS B SURFACE AG IA: CPT | Performed by: OBSTETRICS & GYNECOLOGY

## 2022-07-21 PROCEDURE — 87389 HIV-1 AG W/HIV-1&-2 AB AG IA: CPT | Performed by: OBSTETRICS & GYNECOLOGY

## 2022-07-21 PROCEDURE — 86900 BLOOD TYPING SEROLOGIC ABO: CPT | Performed by: OBSTETRICS & GYNECOLOGY

## 2022-07-21 PROCEDURE — 84550 ASSAY OF BLOOD/URIC ACID: CPT | Performed by: OBSTETRICS & GYNECOLOGY

## 2022-07-21 PROCEDURE — 84460 ALANINE AMINO (ALT) (SGPT): CPT | Performed by: OBSTETRICS & GYNECOLOGY

## 2022-07-21 PROCEDURE — 86850 RBC ANTIBODY SCREEN: CPT | Performed by: OBSTETRICS & GYNECOLOGY

## 2022-07-21 PROCEDURE — 86762 RUBELLA ANTIBODY: CPT | Performed by: OBSTETRICS & GYNECOLOGY

## 2022-07-21 PROCEDURE — 76817 TRANSVAGINAL US OBSTETRIC: CPT | Performed by: OBSTETRICS & GYNECOLOGY

## 2022-07-21 PROCEDURE — 87491 CHLMYD TRACH DNA AMP PROBE: CPT | Performed by: OBSTETRICS & GYNECOLOGY

## 2022-07-21 PROCEDURE — 36415 COLL VENOUS BLD VENIPUNCTURE: CPT | Performed by: OBSTETRICS & GYNECOLOGY

## 2022-07-21 PROCEDURE — 84450 TRANSFERASE (AST) (SGOT): CPT | Performed by: OBSTETRICS & GYNECOLOGY

## 2022-07-21 PROCEDURE — 99207 PR FIRST OB VISIT: CPT | Performed by: OBSTETRICS & GYNECOLOGY

## 2022-07-21 PROCEDURE — 81001 URINALYSIS AUTO W/SCOPE: CPT | Performed by: OBSTETRICS & GYNECOLOGY

## 2022-07-21 PROCEDURE — 85027 COMPLETE CBC AUTOMATED: CPT | Performed by: OBSTETRICS & GYNECOLOGY

## 2022-07-21 PROCEDURE — 86901 BLOOD TYPING SEROLOGIC RH(D): CPT | Performed by: OBSTETRICS & GYNECOLOGY

## 2022-07-21 PROCEDURE — 82565 ASSAY OF CREATININE: CPT | Performed by: OBSTETRICS & GYNECOLOGY

## 2022-07-21 PROCEDURE — 87591 N.GONORRHOEAE DNA AMP PROB: CPT | Performed by: OBSTETRICS & GYNECOLOGY

## 2022-07-21 PROCEDURE — 87086 URINE CULTURE/COLONY COUNT: CPT | Performed by: OBSTETRICS & GYNECOLOGY

## 2022-07-21 PROCEDURE — 84156 ASSAY OF PROTEIN URINE: CPT | Performed by: OBSTETRICS & GYNECOLOGY

## 2022-07-21 PROCEDURE — 86780 TREPONEMA PALLIDUM: CPT | Performed by: OBSTETRICS & GYNECOLOGY

## 2022-07-21 NOTE — PROGRESS NOTES
"Brenda is a 27 year old  @ 9.0 weeks here for new ob visit.    Planned pregnancy.  Irregular menses    ROS: Ten point review of systems was reviewed and negative except the above.  Current Issues include: fatigue    OBhx: never pregnant    Past Medical History:   Diagnosis Date     Cat-scratch disease 2004     Lyme disease -    also cat scratch disease     Past Surgical History:   Procedure Laterality Date     EXCISE GANGLION WRIST  2013    Procedure: EXCISE GANGLION WRIST;  excision left wrist dorsal cyst;  Surgeon: Karina Webb MD;  [facility]     EXCISE GANGLION WRIST Right 2014    Procedure: EXCISE GANGLION WRIST;  Surgeon: Danis Hodge MD;  Location: [facility] OR     Patient Active Problem List    Diagnosis Date Noted     Prenatal care, first pregnancy 2022     Priority: Medium     Irregular menstrual cycle 10/24/2014     Priority: Medium        Allergies   Allergen Reactions     No Known Allergies      Prenatal Vit-Fe Fumarate-FA (PRENATAL MULTIVITAMIN W/IRON) 27-0.8 MG tablet, Take 1 tablet by mouth daily    No current facility-administered medications on file prior to visit.    FH: Her family history was reviewed and documented in its appropriate chart area.    Past Medical History of Father of Baby:   No significant medical history    Physical Exam: BP (!) 135/92 (BP Location: Right arm, Patient Position: Chair, Cuff Size: Adult Regular)   Pulse 108   Temp 98  F (36.7  C) (Tympanic)   Resp 16   Ht 1.695 m (5' 6.73\")   Wt 64.9 kg (143 lb)   LMP 2022   Breastfeeding No   BMI 22.58 kg/m    General: Well developed, well nourished female  Skin: Normal  HEENT: Normal   Abdomen: Benign and Soft, flat, non-tender   Extremities: Normal  Neurological: Normal   Perineum: Normal   Vulva: Normal     Transvaginal ultrasound was performed.  A viable intrauterine pregnancy was seen.  CRL consistent with 8 weeks, 1 days.  Fetal heart motion was visualized.    EDC by " LMP: 23   EDC by sono:  3/1/23  Final EDC: 3/1/23 (menses irregular)    A/P 27 year old  at  8.1 weeks    1. Discussed physician coverage, tertiary support, diet, exercise, weight gain, schedule of visits, routine and indicated ultrasounds, and childbirth education.    2. Options for  testing for chromosomal and birth defects were discussed with the patient including nuchal lucency/blood marker testing in the first trimester and quad screening and/or Level 2 ultrasound in the second trimester.  We discussed that these are screening tests and not diagnostic tests and that false positives and negatives are a distinct possibility.  We discussed that follow up diagnostic testing would include chorionic villus sampling or amniocentesis depending on gestational age.    Also discussed the option of NIPT, which is less invasive, more accurate, but not always covered by insurance.      3. Prenatal labs, GC, Chlamydia    4. Prenatal Vitamins    5. Elevated BP: will check PIH labs    Karina Wheeler M.D.

## 2022-07-21 NOTE — NURSING NOTE
"Initial BP (!) 135/92 (BP Location: Right arm, Patient Position: Chair, Cuff Size: Adult Regular)   Pulse 108   Temp 98  F (36.7  C) (Tympanic)   Resp 16   Ht 1.695 m (5' 6.73\")   Wt 64.9 kg (143 lb)   LMP 05/19/2022   Breastfeeding No   BMI 22.58 kg/m   Estimated body mass index is 22.58 kg/m  as calculated from the following:    Height as of this encounter: 1.695 m (5' 6.73\").    Weight as of this encounter: 64.9 kg (143 lb). .    Brenda Roca, PADMAJA    "

## 2022-07-22 LAB
C TRACH DNA SPEC QL PROBE+SIG AMP: NEGATIVE
HBV SURFACE AG SERPL QL IA: NONREACTIVE
HCV AB SERPL QL IA: NONREACTIVE
HIV 1+2 AB+HIV1 P24 AG SERPL QL IA: NONREACTIVE
N GONORRHOEA DNA SPEC QL NAA+PROBE: NEGATIVE
RUBV IGG SERPL QL IA: 8.85 INDEX
RUBV IGG SERPL QL IA: POSITIVE
T PALLIDUM AB SER QL: NONREACTIVE

## 2022-07-23 LAB — BACTERIA UR CULT: NO GROWTH

## 2022-08-18 ENCOUNTER — PRENATAL OFFICE VISIT (OUTPATIENT)
Dept: OBGYN | Facility: CLINIC | Age: 27
End: 2022-08-18
Payer: COMMERCIAL

## 2022-08-18 VITALS
WEIGHT: 143 LBS | SYSTOLIC BLOOD PRESSURE: 117 MMHG | HEART RATE: 89 BPM | BODY MASS INDEX: 22.58 KG/M2 | TEMPERATURE: 98 F | DIASTOLIC BLOOD PRESSURE: 76 MMHG

## 2022-08-18 DIAGNOSIS — Z34.91 PRENATAL CARE, FIRST TRIMESTER: Primary | ICD-10-CM

## 2022-08-18 PROCEDURE — 99207 PR PRENATAL VISIT: CPT | Performed by: OBSTETRICS & GYNECOLOGY

## 2022-08-18 RX ORDER — SENNA AND DOCUSATE SODIUM 50; 8.6 MG/1; MG/1
1-2 TABLET, FILM COATED ORAL 2 TIMES DAILY PRN
Qty: 60 TABLET | Refills: 2 | Status: SHIPPED | OUTPATIENT
Start: 2022-08-18 | End: 2024-06-21

## 2022-08-18 NOTE — PROGRESS NOTES
"[facility]   OB/GYN Clinic    CC: Return OB     Subjective:    Brenda is a 27 year old  at 12w1d who presents for return OB visit. She reports feeling well. Denies uterine cramping, vaginal bleeding or leaking, dysuria. Has had constipation and HAs.    Objective:  /76 (BP Location: Left arm, Patient Position: Chair, Cuff Size: Adult Regular)   Pulse 89   Temp 98  F (36.7  C) (Tympanic)   Wt 64.9 kg (143 lb)   LMP 2022 (Approximate)   Breastfeeding No   BMI 22.58 kg/m      Estimated body mass index is 22.58 kg/m  as calculated from the following:    Height as of 22: 1.695 m (5' 6.73\").    Weight as of this encounter: 64.9 kg (143 lb).    Physical Exam:  Gen: Pleasant, talkative female in no apparent distress   Respiratory: breathing comfortably on room air   Cardiac: Warm and well-perfused.   GI: Abd soft and non-tender, gravid  MSK: Grossly normal movement of all four extremities  Psych: mood and affect bright   Lower extremity: edema not present     Fetal dop tones: 160s bpm      Assessment/Plan:   27 year old  at 12w1d who presents for follow-up OB visit.   1) New OB lab; T&S, CBC, HIV, RPR, HepBsAg, Hep B antibody, rubella, GC/Chlam WNL. Plan for 3rd tri lab at 28wks.   2) Genetics testing: declines   3) anatomy US at 20w  4) elevated BP at new OB- pt reports being anxious that visit, no hx of elevated BPs otherwise, will have start ASA, HELLP labs WNL  5) constipation- senna s sent  6) HA- discussed caffeine, tylenol, benadryl as possible meds to try    Return to clinic in 4 weeks.     Jaymie Ellis MD   2022 3:07 PM     "

## 2022-08-18 NOTE — NURSING NOTE
"Initial /76 (BP Location: Left arm, Patient Position: Chair, Cuff Size: Adult Regular)   Pulse 89   Temp 98  F (36.7  C) (Tympanic)   Wt 64.9 kg (143 lb)   LMP 05/19/2022 (Approximate)   Breastfeeding No   BMI 22.58 kg/m   Estimated body mass index is 22.58 kg/m  as calculated from the following:    Height as of 7/21/22: 1.695 m (5' 6.73\").    Weight as of this encounter: 64.9 kg (143 lb). .    Riri Langston MA    "

## 2022-09-15 ENCOUNTER — PRENATAL OFFICE VISIT (OUTPATIENT)
Dept: OBGYN | Facility: CLINIC | Age: 27
End: 2022-09-15
Payer: COMMERCIAL

## 2022-09-15 VITALS
HEART RATE: 86 BPM | SYSTOLIC BLOOD PRESSURE: 113 MMHG | BODY MASS INDEX: 22.58 KG/M2 | DIASTOLIC BLOOD PRESSURE: 72 MMHG | WEIGHT: 143 LBS | TEMPERATURE: 98.1 F

## 2022-09-15 DIAGNOSIS — Z34.02 ENCOUNTER FOR PRENATAL CARE IN SECOND TRIMESTER OF FIRST PREGNANCY: Primary | ICD-10-CM

## 2022-09-15 PROCEDURE — 99207 PR PRENATAL VISIT: CPT | Performed by: OBSTETRICS & GYNECOLOGY

## 2022-09-15 NOTE — NURSING NOTE
"Initial /72 (BP Location: Left arm, Patient Position: Chair, Cuff Size: Adult Regular)   Pulse 86   Temp 98.1  F (36.7  C) (Tympanic)   Wt 64.9 kg (143 lb)   LMP 05/19/2022 (Approximate)   Breastfeeding No   BMI 22.58 kg/m   Estimated body mass index is 22.58 kg/m  as calculated from the following:    Height as of 7/21/22: 1.695 m (5' 6.73\").    Weight as of this encounter: 64.9 kg (143 lb). .      Riri Langston MA    "

## 2022-09-15 NOTE — PROGRESS NOTES
[facility]   OB/GYN Clinic     CC: Return OB      Subjective:     Brenda is a 27 year old  at 16w1d who presents for return OB visit. She reports feeling well. Denies uterine cramping, vaginal bleeding or leaking, dysuria.      Objective:/72 (BP Location: Left arm, Patient Position: Chair, Cuff Size: Adult Regular)   Pulse 86   Temp 98.1  F (36.7  C) (Tympanic)   Wt 64.9 kg (143 lb)   LMP 2022 (Approximate)   Breastfeeding No   BMI 22.58 kg/m        Physical Exam:  Gen: Pleasant, talkative female in no apparent distress   Respiratory: breathing comfortably on room air   Cardiac: Warm and well-perfused.   GI: Abd soft and non-tender, gravid  MSK: Grossly normal movement of all four extremities  Psych: mood and affect bright   Lower extremity: edema not present      Fetal dop tones: 160s bpm        Assessment/Plan:   27 year old  at 16w1d who presents for follow-up OB visit.   1) New OB lab; T&S, CBC, HIV, RPR, HepBsAg, Hep B antibody, rubella, GC/Chlam WNL. Plan for 3rd tri lab at 28wks.   2) Genetics testing: declines   3) anatomy US at 20w  4) elevated BP at new OB- pt reports being anxious that visit, no hx of elevated BPs otherwise, will have start ASA, HELLP labs WNL  5) constipation- senna s sent  6) HA- improved  7) plans flu shot at work     Jaymie Ellis MD   9/15/2022 2:47 PM

## 2022-10-09 ENCOUNTER — HEALTH MAINTENANCE LETTER (OUTPATIENT)
Age: 27
End: 2022-10-09

## 2022-10-13 ENCOUNTER — HOSPITAL ENCOUNTER (OUTPATIENT)
Dept: ULTRASOUND IMAGING | Facility: CLINIC | Age: 27
Discharge: HOME | End: 2022-10-13
Attending: OBSTETRICS & GYNECOLOGY | Admitting: OBSTETRICS & GYNECOLOGY
Payer: COMMERCIAL

## 2022-10-13 ENCOUNTER — PRENATAL OFFICE VISIT (OUTPATIENT)
Dept: OBGYN | Facility: CLINIC | Age: 27
End: 2022-10-13
Payer: COMMERCIAL

## 2022-10-13 VITALS
SYSTOLIC BLOOD PRESSURE: 109 MMHG | BODY MASS INDEX: 23.17 KG/M2 | HEART RATE: 90 BPM | TEMPERATURE: 98.2 F | RESPIRATION RATE: 16 BRPM | HEIGHT: 67 IN | WEIGHT: 147.6 LBS | DIASTOLIC BLOOD PRESSURE: 66 MMHG

## 2022-10-13 DIAGNOSIS — Z34.02 ENCOUNTER FOR PRENATAL CARE IN SECOND TRIMESTER OF FIRST PREGNANCY: ICD-10-CM

## 2022-10-13 DIAGNOSIS — Z23 NEED FOR PROPHYLACTIC VACCINATION AND INOCULATION AGAINST INFLUENZA: ICD-10-CM

## 2022-10-13 DIAGNOSIS — Z34.02 ENCOUNTER FOR PRENATAL CARE IN SECOND TRIMESTER OF FIRST PREGNANCY: Primary | ICD-10-CM

## 2022-10-13 PROCEDURE — 99207 PR PRENATAL VISIT: CPT | Performed by: OBSTETRICS & GYNECOLOGY

## 2022-10-13 PROCEDURE — 90686 IIV4 VACC NO PRSV 0.5 ML IM: CPT | Performed by: OBSTETRICS & GYNECOLOGY

## 2022-10-13 PROCEDURE — 90471 IMMUNIZATION ADMIN: CPT | Performed by: OBSTETRICS & GYNECOLOGY

## 2022-10-13 PROCEDURE — 76805 OB US >/= 14 WKS SNGL FETUS: CPT

## 2022-10-13 NOTE — PROGRESS NOTES
"[facility] OB/GYN Clinic    Return OB Note    CC: Return OB     Subjective:  Brenda is a 27 year old  at 20w1d   Denies vaginal bleeding, loss of fluid, or regular contractions. Good fetal movement.  Complaints today: None    Objective:  /66 (BP Location: Left arm, Patient Position: Sitting, Cuff Size: Adult Regular)   Pulse 90   Temp 98.2  F (36.8  C)   Resp 16   Ht 1.695 m (5' 6.75\")   Wt 67 kg (147 lb 9.6 oz)   LMP 2022 (Approximate)   BMI 23.29 kg/m      Fundal height: 21cm  FHT: 150bpm      Assessment/Plan:   Encounter Diagnoses   Name Primary?     Encounter for prenatal care in second trimester of first pregnancy Yes     Need for prophylactic vaccination and inoculation against influenza        IUP at 20w1d  -NOB labs normal  -Anatomy US today: multiple suboptimal views, also choroid plexus cysts seen. Discussed recommendation for repeat imaging, given option for L2 vs. US at [facility] due to finding of choroid plexus cyst. Undetermined significance at this point, no genetic screening and multiple other suboptimal views so can not say with certainty everything is normal. They would prefer to repeat US here at [facility] and if any other abnormalities would do L2.   -Elevated BP at NOB: has been normal since. Baseline labs WNL. Taking baby ASA.  -Flu shot today  -Strict return precautions given    RTC 4 weeks    Aida Morgan,     "

## 2022-11-09 ENCOUNTER — HOSPITAL ENCOUNTER (OUTPATIENT)
Dept: ULTRASOUND IMAGING | Facility: CLINIC | Age: 27
Discharge: HOME | End: 2022-11-09
Attending: OBSTETRICS & GYNECOLOGY | Admitting: OBSTETRICS & GYNECOLOGY
Payer: COMMERCIAL

## 2022-11-09 ENCOUNTER — PRENATAL OFFICE VISIT (OUTPATIENT)
Dept: OBGYN | Facility: CLINIC | Age: 27
End: 2022-11-09
Payer: COMMERCIAL

## 2022-11-09 VITALS
SYSTOLIC BLOOD PRESSURE: 113 MMHG | TEMPERATURE: 98.4 F | HEIGHT: 67 IN | DIASTOLIC BLOOD PRESSURE: 68 MMHG | WEIGHT: 152 LBS | RESPIRATION RATE: 18 BRPM | BODY MASS INDEX: 23.86 KG/M2 | HEART RATE: 89 BPM

## 2022-11-09 DIAGNOSIS — Z34.02 ENCOUNTER FOR PRENATAL CARE IN SECOND TRIMESTER OF FIRST PREGNANCY: ICD-10-CM

## 2022-11-09 DIAGNOSIS — Z34.02 ENCOUNTER FOR PRENATAL CARE IN SECOND TRIMESTER OF FIRST PREGNANCY: Primary | ICD-10-CM

## 2022-11-09 LAB
ERYTHROCYTE [DISTWIDTH] IN BLOOD BY AUTOMATED COUNT: 13 % (ref 10–15)
GLUCOSE 1H P 50 G GLC PO SERPL-MCNC: 134 MG/DL (ref 70–129)
HCT VFR BLD AUTO: 37.7 % (ref 35–47)
HGB BLD-MCNC: 12.4 G/DL (ref 11.7–15.7)
MCH RBC QN AUTO: 31.1 PG (ref 26.5–33)
MCHC RBC AUTO-ENTMCNC: 32.9 G/DL (ref 31.5–36.5)
MCV RBC AUTO: 95 FL (ref 78–100)
PLATELET # BLD AUTO: 291 10E3/UL (ref 150–450)
RBC # BLD AUTO: 3.99 10E6/UL (ref 3.8–5.2)
WBC # BLD AUTO: 13.8 10E3/UL (ref 4–11)

## 2022-11-09 PROCEDURE — 86780 TREPONEMA PALLIDUM: CPT | Performed by: STUDENT IN AN ORGANIZED HEALTH CARE EDUCATION/TRAINING PROGRAM

## 2022-11-09 PROCEDURE — 76816 OB US FOLLOW-UP PER FETUS: CPT

## 2022-11-09 PROCEDURE — 82950 GLUCOSE TEST: CPT | Performed by: STUDENT IN AN ORGANIZED HEALTH CARE EDUCATION/TRAINING PROGRAM

## 2022-11-09 PROCEDURE — 85027 COMPLETE CBC AUTOMATED: CPT | Performed by: STUDENT IN AN ORGANIZED HEALTH CARE EDUCATION/TRAINING PROGRAM

## 2022-11-09 PROCEDURE — 36415 COLL VENOUS BLD VENIPUNCTURE: CPT | Performed by: STUDENT IN AN ORGANIZED HEALTH CARE EDUCATION/TRAINING PROGRAM

## 2022-11-09 PROCEDURE — 99207 PR PRENATAL VISIT: CPT | Performed by: STUDENT IN AN ORGANIZED HEALTH CARE EDUCATION/TRAINING PROGRAM

## 2022-11-09 NOTE — NURSING NOTE
"Initial /68 (BP Location: Left arm, Patient Position: Chair, Cuff Size: Adult Regular)   Pulse 89   Temp 98.4  F (36.9  C) (Tympanic)   Resp 18   Ht 1.695 m (5' 6.75\")   Wt 68.9 kg (152 lb)   LMP 05/19/2022 (Approximate)   BMI 23.99 kg/m   Estimated body mass index is 23.99 kg/m  as calculated from the following:    Height as of this encounter: 1.695 m (5' 6.75\").    Weight as of this encounter: 68.9 kg (152 lb). .      "

## 2022-11-09 NOTE — PROGRESS NOTES
"[facility]  Return OB Note    Subjective:  Denies vaginal bleeding, loss of fluid, or regular contractions. Noted fetal movement.  Complaints today: denied    Objective:  /68 (BP Location: Left arm, Patient Position: Chair, Cuff Size: Adult Regular)   Pulse 89   Temp 98.4  F (36.9  C) (Tympanic)   Resp 18   Ht 1.695 m (5' 6.75\")   Wt 68.9 kg (152 lb)   LMP 2022 (Approximate)   BMI 23.99 kg/m      Fundal height: 24cm  FHT: 144bpm by follow up ultrasound     Assessment/Plan:   Brenda Navarrete is a 27 year old  at 24w0d by 8w1d US, here for return OB visit.    Routine prenatal care:  - New OB labs 2022: A pos, kevin neg; Hgb 13, plt 310, RPR/HIV/HepB/HepC NR, Rubella immune, chlamydia/gonorrhea neg; PAP smear NILM  - Dating US on 2022 at 8w1d  - Anatomy US on 10/13/2022 with suboptimal views. Repeat US pending today  - Genetic screen: declined  - Third trimester labs: CBC, GCT, RPR obtained today  - Immunizations: s/p influenza vaccine 10/13/2022, Tdap vaccine at next visit, declined COVID vaccine    Elevated BP at NOB: has been normal since. Baseline labs WNL. Taking baby ASA.    RTC 4 weeks    Nicolasa Baldwin MD  Office phone: [phone]  Obstetrics and Gynecology  [facility]   2022           "

## 2022-11-10 ENCOUNTER — TELEPHONE (OUTPATIENT)
Dept: OBGYN | Facility: CLINIC | Age: 27
End: 2022-11-10

## 2022-11-10 DIAGNOSIS — E74.39 GLUCOSE INTOLERANCE: Primary | ICD-10-CM

## 2022-11-10 LAB — T PALLIDUM AB SER QL: NONREACTIVE

## 2022-11-10 NOTE — TELEPHONE ENCOUNTER
Reason for Call:  Other PLEASE PLACE 3HR GLUCOSE ORDER ON CHART    Detailed comments: SCHEDULED FOR 11-17-22    Phone Number Patient can be reached at: Cell number on file:    Telephone Information:   Mobile [phone]       Best Time: NOT NEEDED    Can we leave a detailed message on this number? YES    Call taken on 11/10/2022 at 8:25 AM by Mayuri Muller      3hr GTT order placed.  Nicolasa Baldwin MD  Office phone: [phone]  Pager: [phone]  Obstetrics and Gynecology  [facility]   11/10/2022 8:39 AM

## 2022-11-14 ENCOUNTER — TRANSCRIBE ORDERS (OUTPATIENT)
Dept: MATERNAL FETAL MEDICINE | Facility: HOSPITAL | Age: 27
End: 2022-11-14

## 2022-11-14 DIAGNOSIS — O26.90 PREGNANCY RELATED CONDITION, ANTEPARTUM: Primary | ICD-10-CM

## 2022-11-17 ENCOUNTER — LAB (OUTPATIENT)
Dept: LAB | Facility: CLINIC | Age: 27
End: 2022-11-17
Payer: COMMERCIAL

## 2022-11-17 DIAGNOSIS — E74.39 GLUCOSE INTOLERANCE: ICD-10-CM

## 2022-11-17 LAB
GESTATIONAL GTT 1 HR POST DOSE: 184 MG/DL (ref 60–179)
GESTATIONAL GTT 2 HR POST DOSE: 165 MG/DL (ref 60–154)
GESTATIONAL GTT 3 HR POST DOSE: 142 MG/DL (ref 60–139)
GLUCOSE P FAST SERPL-MCNC: 80 MG/DL (ref 60–94)

## 2022-11-17 PROCEDURE — 36415 COLL VENOUS BLD VENIPUNCTURE: CPT

## 2022-11-17 PROCEDURE — 82951 GLUCOSE TOLERANCE TEST (GTT): CPT

## 2022-11-17 PROCEDURE — 82952 GTT-ADDED SAMPLES: CPT

## 2022-11-21 DIAGNOSIS — O24.410 GDM, CLASS A1: Primary | ICD-10-CM

## 2022-11-22 ENCOUNTER — TELEPHONE (OUTPATIENT)
Dept: OBGYN | Facility: CLINIC | Age: 27
End: 2022-11-22

## 2022-11-22 NOTE — TELEPHONE ENCOUNTER
Diabetes Education Scheduling Outreach #1:    [employer] message sent to patient requesting to call to schedule.    Lauren Diaz OnCall  Diabetes and Nutrition Scheduling

## 2022-11-25 ENCOUNTER — PRE VISIT (OUTPATIENT)
Dept: MATERNAL FETAL MEDICINE | Facility: HOSPITAL | Age: 27
End: 2022-11-25

## 2022-12-01 ENCOUNTER — ANCILLARY PROCEDURE (OUTPATIENT)
Dept: ULTRASOUND IMAGING | Facility: HOSPITAL | Age: 27
End: 2022-12-01
Attending: OBSTETRICS & GYNECOLOGY
Payer: COMMERCIAL

## 2022-12-01 ENCOUNTER — OFFICE VISIT (OUTPATIENT)
Dept: MATERNAL FETAL MEDICINE | Facility: HOSPITAL | Age: 27
End: 2022-12-01
Attending: OBSTETRICS & GYNECOLOGY
Payer: COMMERCIAL

## 2022-12-01 DIAGNOSIS — O26.90 PREGNANCY RELATED CONDITION, ANTEPARTUM: ICD-10-CM

## 2022-12-01 DIAGNOSIS — O24.410 DIET CONTROLLED GESTATIONAL DIABETES MELLITUS (GDM) IN THIRD TRIMESTER: Primary | ICD-10-CM

## 2022-12-01 PROCEDURE — 99207 PR NO CHARGE LOS: CPT | Performed by: OBSTETRICS & GYNECOLOGY

## 2022-12-01 PROCEDURE — 76811 OB US DETAILED SNGL FETUS: CPT | Mod: 26 | Performed by: OBSTETRICS & GYNECOLOGY

## 2022-12-01 PROCEDURE — 76811 OB US DETAILED SNGL FETUS: CPT

## 2022-12-02 ENCOUNTER — VIRTUAL VISIT (OUTPATIENT)
Dept: EDUCATION SERVICES | Facility: CLINIC | Age: 27
End: 2022-12-02
Payer: COMMERCIAL

## 2022-12-02 DIAGNOSIS — O24.410 GDM, CLASS A1: ICD-10-CM

## 2022-12-02 PROCEDURE — G0108 DIAB MANAGE TRN  PER INDIV: HCPCS | Mod: AE

## 2022-12-02 RX ORDER — URINE ACETONE TEST STRIPS
STRIP MISCELLANEOUS
Qty: 50 STRIP | Refills: 1 | Status: SHIPPED | OUTPATIENT
Start: 2022-12-02 | End: 2024-06-21

## 2022-12-02 RX ORDER — BLOOD PRESSURE TEST KIT
1 KIT MISCELLANEOUS 4 TIMES DAILY
Qty: 100 EACH | Refills: 1 | Status: SHIPPED | OUTPATIENT
Start: 2022-12-02 | End: 2024-06-21

## 2022-12-02 NOTE — PROGRESS NOTES
Diabetes Self-Management Education & Support  Telephone visit:  Provider Location: Home  Patient Location: Work  Call time: 1:31p-2:11pm (40 minutes)    SUBJECTIVE/OBJECTIVE:  Presents for education related to gestational diabetes.    Accompanied by: Self  Gestational weeks: 27 weeks  Hospital planned for delivery: [place]  Next OB Visit Date: 12/06/22  Number of previous pregnancies: 0  Had any babies over 9 lbs: No  Previously had Gestational Diabetes: No  Have you ever had thyroid problems or taken thyroid medication?: No  Heart disease, mitral valve prolapse or rheumatic fever?: No  Hypertension : No  High Cholesterol: No  High Triglycerides: No  Do you use tobacco products?: No  Do you drink beer, wine or hard liquor?: No    Cultural Influences/Ethnic Background:  Not  or       Estimated Date of Delivery: Mar 1, 2023    1 hour OGTT  Lab Results   Component Value Date    GLU1 134 (H) 11/09/2022         3 hour OGTT    Fasting  Lab Results   Component Value Date    GTTGF 80 11/17/2022       1 hour  Lab Results   Component Value Date    GTTG1 184 (H) 11/17/2022       2 hour  Lab Results   Component Value Date    GTTG2 165 (H) 11/17/2022       3 hour  Lab Results   Component Value Date    GTTG3 142 (H) 11/17/2022       Lifestyle and Health Behaviors:  Pre-pregnancy weight (lbs): 140  Exercise:: Yes  Days per week of moderate to strenuous exercise (like a brisk walk): 5  How intense was your typical exercise? : Light (like stretching or slow walking)  Meals include: Breakfast, Lunch, Morning Snack, Afternoon Snack, Dinner, Evening Snack  Breakfast: granola bar, fruit OR eggs, cottage cheese OR oatmeal with PB in it OR yogurt and berries  Lunch: 12-1: Meal prep for the week on Sudays- sausage, brussle sprouts, carrots, sweet potato OR eggs, string cheese, grapes  Dinner: 5:30-6:30: Hamburger and broccoli, salmon or chicken salad OR quesadilla with veggies and chicken weekends are more steak on  grill  Snacks: apple and PB OR sting cheese OR cashews/almonds OR yogurt and fruit OR piece of toast with glass of milk OR popcorn  Beverages: Coffee, Tea, Other, Juice, Water, Milk (sparkling water or crystal light)  How many servings of fruits/vegetables per day: 3  Pre- vitamin?: Yes  Supplements?: No  Experiencing nausea?: No  Experiencing heartburn?: Yes    Healthy Coping:  Emotional response to diabetes: Ready to learn  Informal Support system:: Spouse  Stage of change: ACTION (Actively working towards change)    Current Management:  Taking medications for gestational diabetes?: No    ASSESSMENT:    Date  Ketones FBG 1 hours post Breakfast 1 hour post lunch    1 hours post dinner     94 134       73 112 83 104      77 114 92 2 Hrs after 127     82 91 84 86     Has started testing with meter over the counter from pharmacy.  Ordered an additional meter today as well for her to keep at work.    Activity consists of yoga/stretching daily in the morning.  Takes brief walk around office at noon.  Otherwise, sitting at work throughout the day.  Diet is well balanced and has good portion control.  Is eating small meals and snacking between.  Does not drink sugar-sweetened beverages.    INTERVENTION:  Reviewed use of meter and hand hygiene.  Brenda has already been testing BG and verbalizes proper meter use.     Educational topics covered today:  GDM diagnosis, pathophysiology, Risks and Complications of GDM, Means of controlling GDM, Using a Blood Glucose Monitor, Blood Glucose Goals, Logging and Interpreting Glucose Results, Ketone Testing, When to Call a Diabetes Educator or OB Provider, Healthy Eating During Pregnancy, Counting Carbohydrates, Meal Planning for GDM, and Physical Activity    Educational materials provided - sent via mail prior to visit:   Joe Understanding Gestational Diabetes  GDM Log Book  Sharps Disposal  Care After Delivery      Pt verbalized understanding of concepts  discussed and recommendations provided today.     PLAN:  Check glucose 4 times daily, before breakfast and 1 hour after each meal.     Check Ketones daily for one week, if negative, reduce testing to once a week.     Physical activity recommended: walking and yoga as you are daily.    Meal plan: 2-3 carbs (30-45 grams) at breakfast, 3-4 carbs (45-60 grams) at lunch, 3-4 carbs (45-60 grams) at supper, 15-30 carbs at 3 snacks a day.  Follow consistent CHO meal plan, eat CHO and protein/fat at all meals/snacks.    Call/e-mail/Quest apphart message diabetes educator if 3 or more blood sugars are above the goal in 1 week, if ketones are positive, or with questions/concerns.    Landy Merchant RDN, LD  Outpatient Diabetes Education  Adult Diabetes Education Triage [phone]    Time Spent: 40 minutes  Encounter Type: Individual    Any diabetes medication dose changes were made via the CDE Protocol and Collaborative Practice Agreement with the patient's OB/GYN provider. A copy of this encounter was shared with the provider.

## 2022-12-02 NOTE — LETTER
12/2/2022         RE: Brenda Bacon  [address] [facility] [address]        Dear Colleague,    Thank you for referring your patient, Brenda Bacon, to the [facility] DIABETES EDUCATION [place]. Please see a copy of my visit note below.    Diabetes Self-Management Education & Support  Telephone visit:  Provider Location: Home  Patient Location: Work  Call time: 1:31p-2:11pm (40 minutes)    SUBJECTIVE/OBJECTIVE:  Presents for education related to gestational diabetes.    Accompanied by: Self  Gestational weeks: 27 weeks  [facility] planned [facility]: [facility]  [facility] OB Visit Date: 12/06/22  Number of previous pregnancies: 0  Had any babies over 9 lbs: No  Previously had Gestational Diabetes: No  Have you ever had thyroid problems or taken thyroid medication?: No  Heart disease, mitral valve prolapse or rheumatic fever?: No  Hypertension : No  High Cholesterol: No  High Triglycerides: No  Do you use tobacco products?: No  Do you drink beer, wine or hard liquor?: No    Cultural Influences/Ethnic Background:  Not  or       Estimated Date of Delivery: Mar 1, 2023    1 hour OGTT  Lab Results   Component Value Date    GLU1 134 (H) 11/09/2022         3 hour OGTT    Fasting  Lab Results   Component Value Date    GTTGF 80 11/17/2022       1 hour  Lab Results   Component Value Date    GTTG1 184 (H) 11/17/2022       2 hour  Lab Results   Component Value Date    GTTG2 165 (H) 11/17/2022       3 hour  Lab Results   Component Value Date    GTTG3 142 (H) 11/17/2022       Lifestyle and Health Behaviors:  Pre-pregnancy weight (lbs): 140  Exercise:: Yes  Days per week of moderate to strenuous exercise (like a brisk walk): 5  How intense was your typical exercise? : Light (like stretching or slow walking)  Meals include: Breakfast, Lunch, Morning Snack, Afternoon Snack, Dinner, Evening Snack  Breakfast: granola bar, fruit OR eggs, cottage cheese OR oatmeal with PB in it OR yogurt and  berries  Lunch: 12-1: Meal prep for the week on Sudays- sausage, brussle sprouts, carrots, sweet potato OR eggs, string cheese, grapes  Dinner: 5:30-6:30: Hamburger and broccoli, salmon or chicken salad OR quesadilla with veggies and chicken weekends are more steak on grill  Snacks: apple and PB OR sting cheese OR cashews/almonds OR yogurt and fruit OR piece of toast with glass of milk OR popcorn  Beverages: Coffee, Tea, Other, Juice, Water, Milk (sparkling water or crystal light)  How many servings of fruits/vegetables per day: 3  Pre-giuliano vitamin?: Yes  Supplements?: No  Experiencing nausea?: No  Experiencing heartburn?: Yes    Healthy Coping:  Emotional response to diabetes: Ready to learn  Informal Support system:: Spouse  Stage of change: ACTION (Actively working towards change)    Current Management:  Taking medications for gestational diabetes?: No    ASSESSMENT:    Date  Ketones FBG 1 hours post Breakfast 1 hour post lunch    1 hours post dinner     94 134       73 112 83 104      77 114 92 2 Hrs after 127     82 91 84 86     Has started testing with meter over the counter from pharmacy.  Ordered an additional meter today as well for her to keep at work.    Activity consists of yoga/stretching daily in the morning.  Takes brief walk around office at noon.  Otherwise, sitting at work throughout the day.  Diet is well balanced and has good portion control.  Is eating small meals and snacking between.  Does not drink sugar-sweetened beverages.    INTERVENTION:  Reviewed use of meter and hand hygiene.  Brenda has already been testing BG and verbalizes proper meter use.     Educational topics covered today:  GDM diagnosis, pathophysiology, Risks and Complications of GDM, Means of controlling GDM, Using a Blood Glucose Monitor, Blood Glucose Goals, Logging and Interpreting Glucose Results, Ketone Testing, When to Call a Diabetes Educator or OB Provider, Healthy Eating During Pregnancy, Counting  Carbohydrates, Meal Planning for GDM, and Physical Activity    Educational materials provided - sent via mail prior to visit:   Joe Understanding Gestational Diabetes  GDM Log Book  Sharps Disposal  Care After Delivery      Pt verbalized understanding of concepts discussed and recommendations provided today.     PLAN:  Check glucose 4 times daily, before breakfast and 1 hour after each meal.     Check Ketones daily for one week, if negative, reduce testing to once a week.     Physical activity recommended: walking and yoga as you are daily.    Meal plan: 2-3 carbs (30-45 grams) at breakfast, 3-4 carbs (45-60 grams) at lunch, 3-4 carbs (45-60 grams) at supper, 15-30 carbs at 3 snacks a day.  Follow consistent CHO meal plan, eat CHO and protein/fat at all meals/snacks.    [email or web address] message diabetes educator if 3 or more blood sugars are above the goal in 1 week, if ketones are positive, or with questions/concerns.    Landy Merchant RDN, LD  Outpatient Diabetes Education  Adult Diabetes Education Triage [phone]    Time Spent: 40 minutes  Encounter Type: Individual    Any diabetes medication dose changes were made via the CDE Protocol and Collaborative Practice Agreement with the patient's OB/GYN provider. A copy of this encounter was shared with the provider.

## 2022-12-06 ENCOUNTER — PRENATAL OFFICE VISIT (OUTPATIENT)
Dept: OBGYN | Facility: CLINIC | Age: 27
End: 2022-12-06
Payer: COMMERCIAL

## 2022-12-06 VITALS
WEIGHT: 153 LBS | SYSTOLIC BLOOD PRESSURE: 109 MMHG | HEIGHT: 67 IN | DIASTOLIC BLOOD PRESSURE: 72 MMHG | TEMPERATURE: 97.8 F | RESPIRATION RATE: 16 BRPM | HEART RATE: 90 BPM | BODY MASS INDEX: 24.01 KG/M2

## 2022-12-06 DIAGNOSIS — Z34.02 ENCOUNTER FOR SUPERVISION OF NORMAL FIRST PREGNANCY IN SECOND TRIMESTER: Primary | ICD-10-CM

## 2022-12-06 PROCEDURE — 99207 PR PRENATAL VISIT: CPT | Performed by: OBSTETRICS & GYNECOLOGY

## 2022-12-06 NOTE — PROGRESS NOTES
Concerns: GDM A1  Great control per Diabetic educators  S/p ULTRASOUND @ [facility] on 12/1 w resolution of CP cyst and normal interval growth  No vaginal bleeding, no contractions, no leakage of fluid  No nausea/vomiting. First episode of  Heartburn- resolved with milk  No vaginal discharge. No dysuria.   No headache, vision changes, lower extremity swelling, upper abdominal pain, chest pain, shortness of breath  Reportable signs and symptoms discussed.  Tdap planned next visit  Discussed PTL, PROM, and when to call or come in.  Normal anatomy ultrasound.  RTC 4 weeks.        Дмитрий Constantino MD

## 2022-12-15 ENCOUNTER — VIRTUAL VISIT (OUTPATIENT)
Dept: EDUCATION SERVICES | Facility: CLINIC | Age: 27
End: 2022-12-15
Payer: COMMERCIAL

## 2022-12-15 DIAGNOSIS — O24.419 GESTATIONAL DIABETES MELLITUS: Primary | ICD-10-CM

## 2022-12-15 PROCEDURE — 98967 PH1 ASSMT&MGMT NQHP 11-20: CPT

## 2022-12-15 NOTE — PROGRESS NOTES
Diabetes Self-Management Education & Support    SUBJECTIVE/OBJECTIVE:  Telephone visit:  Provider location: Home  Patient location: Home  Call time: 15 min    Presents for education related to gestational diabetes.    Accompanied by: Self  Gestational weeks: 29 weeks  Next OB Visit Date: 12/20/22  Number of previous pregnancies: 0  Had any babies over 9 lbs: No  Previously had Gestational Diabetes: No  Have you ever had thyroid problems or taken thyroid medication?: No  Heart disease, mitral valve prolapse or rheumatic fever?: No  Hypertension : No  High Cholesterol: No  High Triglycerides: No  Do you use tobacco products?: No  Do you drink beer, wine or hard liquor?: No    Cultural Influences/Ethnic Background:  Not  or       LMP 05/19/2022 (Approximate)         Estimated Date of Delivery: Mar 1, 2023    Blood Glucose/Ketone Log:     Date  Ketones FBG 1 hours post Breakfast 1 hour post lunch    1 hours post dinner   12/15 Neg 84 121 103    12/14 Neg 83 115 92 99   12/13 Neg 75 106 91 87   12/12 Neg 78 103 102 111   12/11 Neg 73 110 87 104   12/10 Neg 82 89 92 127       Lifestyle and Health Behaviors:  Pre-pregnancy weight (lbs): 140  Exercise:: Yes  Days per week of moderate to strenuous exercise (like a brisk walk): 5  On average, minutes per day of exercise at this level: 30  How intense was your typical exercise? : Light (like stretching or slow walking)  Exercise Minutes per Week: 150  Meals include: Breakfast, Lunch, Morning Snack, Afternoon Snack, Dinner, Evening Snack  Breakfast: granola bar, fruit OR eggs, cottage cheese OR oatmeal with PB in it OR yogurt and berries  Lunch: 12-1: Meal prep for the week on Sudays- sausage, brussle sprouts, carrots, sweet potato OR eggs, string cheese, grapes  Dinner: 5:30-6:30: Hamburger and broccoli, salmon or chicken salad OR quesadilla with veggies and chicken weekends are more steak on grill  Snacks: apple and PB OR sting cheese OR cashews/almonds OR  yogurt and fruit OR piece of toast with glass of milk OR popcorn  Beverages: Coffee, Tea, Other, Juice, Water, Milk (sparkling water or crystal light)  How many servings of fruits/vegetables per day: 3  Pre-giuliano vitamin?: Yes  Supplements?: No  Experiencing nausea?: No  Experiencing heartburn?: Yes    Healthy Coping:  Emotional response to diabetes: Ready to learn  Informal Support system:: Spouse  Stage of change: ACTION (Actively working towards change)    Current Management:  Taking medications for gestational diabetes?: No    ASSESSMENT:  Ketones: Negative- will change to weekly testing.   Fasting blood glucoses: 100% in target.  After breakfast: 100% in target.  After lunch: 100% in target.  After dinner: 100% in target.    Ate out at a restaurant in [place], dinner was late and selection was difficult.  BG after dinner was 150 mg/dL which was the highest she has had.  Ate out more than usual last week while out of town.  All other BG were within target.  Snacks are usually yogurt and fruit or popcorn.  Is eating a good variety of foods.  Says she's not hungry and is feeling well.  Continues to walk and does yoga daily.    INTERVENTION:  Educational topics covered today:  What to expect after delivery, Future testing for Type 2 diabetes (2 hour OGTT at 6 week post-partum check-up and annual fasting blood glucose level), Risk of GDM and planning ahead for future pregnancies, Recommended lifestyle interventions for reducing the risk of Type 2 Diabetes, When to Call a Diabetes Educator or OB Provider    Educational Materials provided today:  Joe Preventing Diabetes    PLAN:  Check glucose 4 times daily.  Check ketones once a week when readings are consistently negative.  Continue with recommended physical activity.  Continue to follow recommended meal plan: 2-3 carbs at breakfast, 3-4 carbs at lunch, 3-4 carbs at supper, 1-2 carbs at snacks.  Follow consistent CHO meal plan, eat CHO and protein/fat at all  meals/snacks.    Call/e-mail/MyChart message diabetes educator if 3 or more blood sugars are above the goal in 1 week or if ketones are positive.  Plan to follow up on My chart by sending blood sugar log weekly.    Landy Merchant RDN, LD  Outpatient Diabetes Education  Adult Diabetes Education Triage [phone]    Time Spent: 15 minutes  Encounter Type: Individual    Any diabetes medication dose changes were made via the CDE Protocol and Collaborative Practice Agreement with the patient's referring provider. A copy of this encounter was shared with the provider.

## 2022-12-15 NOTE — LETTER
12/15/2022         RE: Brenda Bacon  [address] [address]        Dear Colleague,    Thank you for referring your patient, Brenda Bacon, to the [facility] DIABETES EDUCATION [place]. Please see a copy of my visit note below.    Diabetes Self-Management Education & Support    SUBJECTIVE/OBJECTIVE:  Telephone visit:  Provider location: Home  Patient location: Home  Call time: 15 min    Presents for education related to gestational diabetes.    Accompanied by: Self  Gestational weeks: 29 weeks  Next OB Visit Date: 12/20/22  Number of previous pregnancies: 0  Had any babies over 9 lbs: No  Previously had Gestational Diabetes: No  Have you ever had thyroid problems or taken thyroid medication?: No  Heart disease, mitral valve prolapse or rheumatic fever?: No  Hypertension : No  High Cholesterol: No  High Triglycerides: No  Do you use tobacco products?: No  Do you drink beer, wine or hard liquor?: No    Cultural Influences/Ethnic Background:  Not  or       LMP 05/19/2022 (Approximate)         Estimated Date of Delivery: Mar 1, 2023    Blood Glucose/Ketone Log:     Date  Ketones FBG 1 hours post Breakfast 1 hour post lunch    1 hours post dinner   12/15 Neg 84 121 103    12/14 Neg 83 115 92 99   12/13 Neg 75 106 91 87   12/12 Neg 78 103 102 111   12/11 Neg 73 110 87 104   12/10 Neg 82 89 92 127       Lifestyle and Health Behaviors:  Pre-pregnancy weight (lbs): 140  Exercise:: Yes  Days per week of moderate to strenuous exercise (like a brisk walk): 5  On average, minutes per day of exercise at this level: 30  How intense was your typical exercise? : Light (like stretching or slow walking)  Exercise Minutes per Week: 150  Meals include: Breakfast, Lunch, Morning Snack, Afternoon Snack, Dinner, Evening Snack  Breakfast: granola bar, fruit OR eggs, cottage cheese OR oatmeal with PB in it OR yogurt and berries  Lunch: 12-1: Meal prep for the week on Sudays- sausage,  brussle sprouts, carrots, sweet potato OR eggs, string cheese, grapes  Dinner: 5:30-6:30: Hamburger and broccoli, salmon or chicken salad OR quesadilla with veggies and chicken weekends are more steak on grill  Snacks: apple and PB OR sting cheese OR cashews/almonds OR yogurt and fruit OR piece of toast with glass of milk OR popcorn  Beverages: Coffee, Tea, Other, Juice, Water, Milk (sparkling water or crystal light)  How many servings of fruits/vegetables per day: 3  Pre-giuliano vitamin?: Yes  Supplements?: No  Experiencing nausea?: No  Experiencing heartburn?: Yes    Healthy Coping:  Emotional response to diabetes: Ready to learn  Informal Support system:: Spouse  Stage of change: ACTION (Actively working towards change)    Current Management:  Taking medications for gestational diabetes?: No    ASSESSMENT:  Ketones: Negative- will change to weekly testing.   Fasting blood glucoses: 100% in target.  After breakfast: 100% in target.  After lunch: 100% in target.  After dinner: 100% in target.    Ate out at a restaurant in [place], dinner was late and selection was difficult.  BG after dinner was 150 mg/dL which was the highest she has had.  Ate out more than usual last week while out of town.  All other BG were within target.  Snacks are usually yogurt and fruit or popcorn.  Is eating a good variety of foods.  Says she's not hungry and is feeling well.  Continues to walk and does yoga daily.    INTERVENTION:  Educational topics covered today:  What to expect after delivery, Future testing for Type 2 diabetes (2 hour OGTT at 6 week post-partum check-up and annual fasting blood glucose level), Risk of GDM and planning ahead for future pregnancies, Recommended lifestyle interventions for reducing the risk of Type 2 Diabetes, When to Call a Diabetes Educator or OB Provider    Educational Materials provided today:  Joe Preventing Diabetes    PLAN:  Check glucose 4 times daily.  Check ketones once a week when readings are  consistently negative.  Continue with recommended physical activity.  Continue to follow recommended meal plan: 2-3 carbs at breakfast, 3-4 carbs at lunch, 3-4 carbs at supper, 1-2 carbs at snacks.  Follow consistent CHO meal plan, eat CHO and protein/fat at all meals/snacks.    Call/e-mail/MyChart message diabetes educator if 3 or more blood sugars are above the goal in 1 week or if ketones are positive.  Plan to follow up on My chart by sending blood sugar log weekly.    Landy Merchant RDN, LD  Outpatient Diabetes Education  Adult Diabetes Education Triage [phone]    Time Spent: 15 minutes  Encounter Type: Individual    Any diabetes medication dose changes were made via the CDE Protocol and Collaborative Practice Agreement with the patient's referring provider. A copy of this encounter was shared with the provider.

## 2022-12-20 ENCOUNTER — PRENATAL OFFICE VISIT (OUTPATIENT)
Dept: OBGYN | Facility: CLINIC | Age: 27
End: 2022-12-20
Payer: COMMERCIAL

## 2022-12-20 VITALS
TEMPERATURE: 96.4 F | WEIGHT: 156.2 LBS | HEIGHT: 67 IN | SYSTOLIC BLOOD PRESSURE: 109 MMHG | BODY MASS INDEX: 24.52 KG/M2 | RESPIRATION RATE: 12 BRPM | DIASTOLIC BLOOD PRESSURE: 72 MMHG | HEART RATE: 93 BPM

## 2022-12-20 DIAGNOSIS — Z34.03 ENCOUNTER FOR PRENATAL CARE IN THIRD TRIMESTER OF FIRST PREGNANCY: Primary | ICD-10-CM

## 2022-12-20 DIAGNOSIS — O24.410 DIET CONTROLLED GESTATIONAL DIABETES MELLITUS (GDM) IN THIRD TRIMESTER: ICD-10-CM

## 2022-12-20 PROCEDURE — 99207 PR PRENATAL VISIT: CPT | Performed by: OBSTETRICS & GYNECOLOGY

## 2022-12-20 PROCEDURE — 90471 IMMUNIZATION ADMIN: CPT | Performed by: OBSTETRICS & GYNECOLOGY

## 2022-12-20 PROCEDURE — 90715 TDAP VACCINE 7 YRS/> IM: CPT | Performed by: OBSTETRICS & GYNECOLOGY

## 2022-12-20 NOTE — PROGRESS NOTES
Prior to immunization administration, verified patients identity using patient s name and date of birth. Please see Immunization Activity for additional information.     Screening Questionnaire for Adult Immunization    Are you sick today?   No   Do you have allergies to medications, food, a vaccine component or latex?   No   Have you ever had a serious reaction after receiving a vaccination?   No   Do you have a long-term health problem with heart, lung, kidney, or metabolic disease (e.g., diabetes), asthma, a blood disorder, no spleen, complement component deficiency, a cochlear implant, or a spinal fluid leak?  Are you on long-term aspirin therapy?   No   Do you have cancer, leukemia, HIV/AIDS, or any other immune system problem?   No   Do you have a parent, brother, or sister with an immune system problem?   No   In the past 3 months, have you taken medications that affect  your immune system, such as prednisone, other steroids, or anticancer drugs; drugs for the treatment of rheumatoid arthritis, Crohn s disease, or psoriasis; or have you had radiation treatments?   No   Have you had a seizure, or a brain or other nervous system problem?   No   During the past year, have you received a transfusion of blood or blood    products, or been given immune (gamma) globulin or antiviral drug?   No   For women: Are you pregnant or is there a chance you could become       pregnant during the next month?   Pregnant   Have you received any vaccinations in the past 4 weeks?   No     Immunization questionnaire answers were all negative.        Per orders of Dr. Morgan, injection of TDAP given by Elliot Flor MA. Patient instructed to remain in clinic for 15 minutes afterwards, and to report any adverse reaction to me immediately.       Screening performed by Elliot Flor MA on 12/20/2022 at 4:29 PM.

## 2022-12-20 NOTE — PROGRESS NOTES
"[facility]    Return OB Note    CC: Return OB     Subjective:  Brenda is a 27 year old  at 29w6d   Denies vaginal bleeding, loss of fluid, or regular contractions. Good fetal movement.  Complaints today: None    Objective:  /72 (BP Location: Right arm, Patient Position: Sitting, Cuff Size: Adult Regular)   Pulse 93   Temp (!) 96.4  F (35.8  C) (Tympanic)   Resp 12   Ht 1.695 m (5' 6.75\")   Wt 70.9 kg (156 lb 3.2 oz)   LMP 2022 (Approximate)   BMI 24.65 kg/m      Fundal height: 29cm  FHT: 130bpm    Assessment/Plan:   Encounter Diagnoses   Name Primary?     Encounter for prenatal care in third trimester of first pregnancy Yes     Diet controlled gestational diabetes mellitus (GDM) in third trimester        IUP at 29w6d    -GDMA1: Blood sugars have been good, all normal. Continue serial growth US, following with MFM due to SGA.  EFW 14%, repeat scheduled for this week.  -Elevated BP at NOB: has been normal since. Baseline labs WNL. Taking baby ASA.  -TDap today, s/p flu shot  -Strict return precautions given    RTC 2 weeks    Aida Morgan DO      "

## 2022-12-20 NOTE — NURSING NOTE
"Initial /72 (BP Location: Right arm, Patient Position: Sitting, Cuff Size: Adult Regular)   Pulse 93   Temp (!) 96.4  F (35.8  C) (Tympanic)   Resp 12   Ht 1.695 m (5' 6.75\")   Wt 70.9 kg (156 lb 3.2 oz)   LMP 05/19/2022 (Approximate)   BMI 24.65 kg/m   Estimated body mass index is 24.65 kg/m  as calculated from the following:    Height as of this encounter: 1.695 m (5' 6.75\").    Weight as of this encounter: 70.9 kg (156 lb 3.2 oz). .      "

## 2022-12-22 ENCOUNTER — MYC MEDICAL ADVICE (OUTPATIENT)
Dept: EDUCATION SERVICES | Facility: CLINIC | Age: 27
End: 2022-12-22

## 2022-12-22 NOTE — TELEPHONE ENCOUNTER
Diabetes Education Follow-up    Subjective/Objective:    Brenda Bacon sent in blood glucose log for review. Last date of communication was: 12/22/22.    Diabetes is being managed with Lifestyle (diet/activity)    BG/Food Log:       Assessment:    Fasting blood glucose: 100% in target.  After breakfast glucose: 100% in target.  After lunch glucose: 100% in target.  After dinner glucose: 100% in target.    Plan/Response:  See Patient Instructions for co-developed, patient-stated behavior change goals.  Check blood sugars 4 times daily.  Keep a blood glucose record for next visit - Send in once weekly via [employer]  No changes in the patient's current treatment plan for GDM.       Any diabetes medication dose changes were made via the CDE Protocol and Collaborative Practice Agreement with the patient's referring provider. A copy of this encounter was shared with the provider.

## 2022-12-28 ENCOUNTER — ANCILLARY PROCEDURE (OUTPATIENT)
Dept: ULTRASOUND IMAGING | Facility: HOSPITAL | Age: 27
End: 2022-12-28
Attending: OBSTETRICS & GYNECOLOGY
Payer: COMMERCIAL

## 2022-12-28 ENCOUNTER — OFFICE VISIT (OUTPATIENT)
Dept: MATERNAL FETAL MEDICINE | Facility: HOSPITAL | Age: 27
End: 2022-12-28
Attending: OBSTETRICS & GYNECOLOGY
Payer: COMMERCIAL

## 2022-12-28 DIAGNOSIS — O24.410 DIET CONTROLLED GESTATIONAL DIABETES MELLITUS (GDM) IN THIRD TRIMESTER: Primary | ICD-10-CM

## 2022-12-28 DIAGNOSIS — O24.410 DIET CONTROLLED GESTATIONAL DIABETES MELLITUS (GDM) IN THIRD TRIMESTER: ICD-10-CM

## 2022-12-28 PROCEDURE — 76816 OB US FOLLOW-UP PER FETUS: CPT | Mod: 26 | Performed by: OBSTETRICS & GYNECOLOGY

## 2022-12-28 PROCEDURE — 76816 OB US FOLLOW-UP PER FETUS: CPT

## 2022-12-28 PROCEDURE — 99207 PR NO CHARGE LOS: CPT | Performed by: OBSTETRICS & GYNECOLOGY

## 2022-12-28 NOTE — PROGRESS NOTES
Please see the imaging tab for details of the ultrasound performed today.    Drea Eldridge MD  Specialist in Maternal-Fetal Medicine

## 2023-01-04 ENCOUNTER — PRENATAL OFFICE VISIT (OUTPATIENT)
Dept: OBGYN | Facility: CLINIC | Age: 28
End: 2023-01-04
Payer: COMMERCIAL

## 2023-01-04 VITALS
HEART RATE: 95 BPM | SYSTOLIC BLOOD PRESSURE: 120 MMHG | BODY MASS INDEX: 24.6 KG/M2 | DIASTOLIC BLOOD PRESSURE: 68 MMHG | HEIGHT: 67 IN | WEIGHT: 156.7 LBS | RESPIRATION RATE: 16 BRPM | TEMPERATURE: 97.5 F

## 2023-01-04 DIAGNOSIS — Z34.03 ENCOUNTER FOR SUPERVISION OF NORMAL FIRST PREGNANCY IN THIRD TRIMESTER: Primary | ICD-10-CM

## 2023-01-04 PROCEDURE — 99207 PR PRENATAL VISIT: CPT | Performed by: OBSTETRICS & GYNECOLOGY

## 2023-01-04 NOTE — PROGRESS NOTES
Concerns: Follow up in 2 weeks @ [facility]  Doing well.  No concerns today.  No vaginal bleeding, LOF.  No contractions.  Reportable signs and symptoms discussed.  Discussed kick counts and fetal movement.  Discussed PTL, PROM, and when to call or come in.  RTC 2 weeks.    Дмитрий Constantino MD

## 2023-01-04 NOTE — PROGRESS NOTES
Handed patient Breast Pump Insurance Questionnaire at today's visit.  Itzel Pickett CMA on 1/4/2023 at 3:57 PM

## 2023-01-17 ENCOUNTER — PRENATAL OFFICE VISIT (OUTPATIENT)
Dept: OBGYN | Facility: CLINIC | Age: 28
End: 2023-01-17
Payer: COMMERCIAL

## 2023-01-17 VITALS
DIASTOLIC BLOOD PRESSURE: 68 MMHG | BODY MASS INDEX: 25.07 KG/M2 | HEIGHT: 67 IN | SYSTOLIC BLOOD PRESSURE: 104 MMHG | RESPIRATION RATE: 16 BRPM | HEART RATE: 91 BPM | WEIGHT: 159.7 LBS | TEMPERATURE: 97.1 F

## 2023-01-17 DIAGNOSIS — Z34.03 ENCOUNTER FOR SUPERVISION OF NORMAL FIRST PREGNANCY IN THIRD TRIMESTER: Primary | ICD-10-CM

## 2023-01-17 PROCEDURE — 99207 PR PRENATAL VISIT: CPT | Performed by: OBSTETRICS & GYNECOLOGY

## 2023-01-17 NOTE — PROGRESS NOTES
Concerns: growth scan tomorrow with MFM  Doing well.  No concerns today.  No vaginal bleeding, LOF.  No contractions.  Cephalic position confirmed by Leopold maneuvers.  Reportable signs and symptoms discussed.  Discussed kick counts and fetal movement.  Discussed PTL, PROM, and when to call or come in.  RTC 2 weeks.    Дмитрий Consatntino MD

## 2023-01-18 ENCOUNTER — OFFICE VISIT (OUTPATIENT)
Dept: MATERNAL FETAL MEDICINE | Facility: HOSPITAL | Age: 28
End: 2023-01-18
Attending: OBSTETRICS & GYNECOLOGY
Payer: COMMERCIAL

## 2023-01-18 ENCOUNTER — ANCILLARY PROCEDURE (OUTPATIENT)
Dept: ULTRASOUND IMAGING | Facility: HOSPITAL | Age: 28
End: 2023-01-18
Attending: OBSTETRICS & GYNECOLOGY
Payer: COMMERCIAL

## 2023-01-18 DIAGNOSIS — O24.410 DIET CONTROLLED GESTATIONAL DIABETES MELLITUS (GDM) IN THIRD TRIMESTER: ICD-10-CM

## 2023-01-18 DIAGNOSIS — O24.410 DIET CONTROLLED GESTATIONAL DIABETES MELLITUS (GDM) IN THIRD TRIMESTER: Primary | ICD-10-CM

## 2023-01-18 DIAGNOSIS — Z36.2 ENCOUNTER FOR FOLLOW-UP ULTRASOUND OF FETAL ANATOMY: ICD-10-CM

## 2023-01-18 PROCEDURE — 99207 PR NO CHARGE LOS: CPT | Performed by: STUDENT IN AN ORGANIZED HEALTH CARE EDUCATION/TRAINING PROGRAM

## 2023-01-18 PROCEDURE — 76816 OB US FOLLOW-UP PER FETUS: CPT

## 2023-01-18 PROCEDURE — 76816 OB US FOLLOW-UP PER FETUS: CPT | Mod: 26 | Performed by: STUDENT IN AN ORGANIZED HEALTH CARE EDUCATION/TRAINING PROGRAM

## 2023-01-18 NOTE — PROGRESS NOTES
Please see the full imaging report from the [employer] program under the imaging tab.    Naima Sosa MD  Maternal Fetal Medicine

## 2023-01-18 NOTE — NURSING NOTE
Brenda Bacon is a  at 34w0d who presents to [facility] for follow up growth ultrasound d/t suboptimal anatomy and GDM diet controlled. Pt checking blood sugars and states she has yet to have an elevated reading. VSS. Pt reports positive fetal movement. Pt denies bldg/lof/change in discharge, contractions, headache, vision changes, chest pain/SOB or edema. SBAR given to Dr. Sosa, see their note in Epic.

## 2023-01-23 ENCOUNTER — MYC MEDICAL ADVICE (OUTPATIENT)
Dept: EDUCATION SERVICES | Facility: CLINIC | Age: 28
End: 2023-01-23
Payer: COMMERCIAL

## 2023-01-31 ENCOUNTER — PRENATAL OFFICE VISIT (OUTPATIENT)
Dept: OBGYN | Facility: CLINIC | Age: 28
End: 2023-01-31
Payer: COMMERCIAL

## 2023-01-31 VITALS
RESPIRATION RATE: 16 BRPM | BODY MASS INDEX: 25.43 KG/M2 | HEIGHT: 67 IN | SYSTOLIC BLOOD PRESSURE: 108 MMHG | HEART RATE: 101 BPM | TEMPERATURE: 98 F | DIASTOLIC BLOOD PRESSURE: 67 MMHG | WEIGHT: 162 LBS

## 2023-01-31 DIAGNOSIS — Z34.03 ENCOUNTER FOR PRENATAL CARE IN THIRD TRIMESTER OF FIRST PREGNANCY: Primary | ICD-10-CM

## 2023-01-31 DIAGNOSIS — O24.410 DIET CONTROLLED GESTATIONAL DIABETES MELLITUS (GDM) IN THIRD TRIMESTER: ICD-10-CM

## 2023-01-31 PROCEDURE — 87653 STREP B DNA AMP PROBE: CPT | Performed by: OBSTETRICS & GYNECOLOGY

## 2023-01-31 PROCEDURE — 99207 PR PRENATAL VISIT: CPT | Performed by: OBSTETRICS & GYNECOLOGY

## 2023-01-31 NOTE — NURSING NOTE
"Initial /67 (BP Location: Right arm, Patient Position: Chair, Cuff Size: Adult Regular)   Pulse 101   Temp 98  F (36.7  C) (Tympanic)   Resp 16   Ht 1.695 m (5' 6.75\")   Wt 73.5 kg (162 lb)   LMP 05/19/2022 (Approximate)   BMI 25.56 kg/m   Estimated body mass index is 25.56 kg/m  as calculated from the following:    Height as of this encounter: 1.695 m (5' 6.75\").    Weight as of this encounter: 73.5 kg (162 lb). .      "

## 2023-01-31 NOTE — PROGRESS NOTES
Concerns: followed by MFM for interval growth  GDM A1 well controlled  Doing well.  No concerns today.  No vaginal bleeding, LOF.  No contractions.  Reportable signs and symptoms discussed.  Discussed kick counts and fetal movement.  Discussed PTL, PROM, and when to call or come in.  RTC 1 week.  GBS culture obtained    Дмитрий Constantino MD

## 2023-02-01 LAB — GP B STREP DNA SPEC QL NAA+PROBE: NEGATIVE

## 2023-02-07 ENCOUNTER — PRENATAL OFFICE VISIT (OUTPATIENT)
Dept: OBGYN | Facility: CLINIC | Age: 28
End: 2023-02-07
Payer: COMMERCIAL

## 2023-02-07 VITALS
DIASTOLIC BLOOD PRESSURE: 67 MMHG | BODY MASS INDEX: 26.27 KG/M2 | HEIGHT: 67 IN | HEART RATE: 101 BPM | TEMPERATURE: 97.3 F | SYSTOLIC BLOOD PRESSURE: 125 MMHG | RESPIRATION RATE: 16 BRPM | WEIGHT: 167.4 LBS

## 2023-02-07 DIAGNOSIS — Z34.03 ENCOUNTER FOR SUPERVISION OF NORMAL FIRST PREGNANCY IN THIRD TRIMESTER: Primary | ICD-10-CM

## 2023-02-07 PROCEDURE — 99207 PR PRENATAL VISIT: CPT | Performed by: OBSTETRICS & GYNECOLOGY

## 2023-02-07 NOTE — PROGRESS NOTES
Concerns: feeling well  .  Doing well.  No concerns today.  No vaginal bleeding, LOF, contractions.  No HA, RUQ pain, N/V, visual changes.  Reportable signs and symptoms discussed.  GBS negative  Labor precautions discussed.  RTC 1 week.  Prenatal flowsheet information is reviewed.    Дмитрий Constantino MD

## 2023-02-14 ENCOUNTER — PRENATAL OFFICE VISIT (OUTPATIENT)
Dept: OBGYN | Facility: CLINIC | Age: 28
End: 2023-02-14
Payer: COMMERCIAL

## 2023-02-14 VITALS
HEIGHT: 67 IN | BODY MASS INDEX: 26.06 KG/M2 | DIASTOLIC BLOOD PRESSURE: 73 MMHG | TEMPERATURE: 96.6 F | WEIGHT: 166 LBS | RESPIRATION RATE: 14 BRPM | SYSTOLIC BLOOD PRESSURE: 116 MMHG | HEART RATE: 101 BPM

## 2023-02-14 DIAGNOSIS — Z34.03 ENCOUNTER FOR PRENATAL CARE IN THIRD TRIMESTER OF FIRST PREGNANCY: Primary | ICD-10-CM

## 2023-02-14 DIAGNOSIS — O24.410 DIET CONTROLLED GESTATIONAL DIABETES MELLITUS (GDM) IN THIRD TRIMESTER: ICD-10-CM

## 2023-02-14 PROCEDURE — 99207 PR PRENATAL VISIT: CPT | Performed by: OBSTETRICS & GYNECOLOGY

## 2023-02-14 NOTE — NURSING NOTE
"Initial /73 (BP Location: Right arm, Patient Position: Sitting, Cuff Size: Adult Regular)   Pulse 101   Temp (!) 96.6  F (35.9  C) (Tympanic)   Resp 14   Ht 1.695 m (5' 6.75\")   Wt 75.3 kg (166 lb)   LMP 05/19/2022 (Approximate)   BMI 26.19 kg/m   Estimated body mass index is 26.19 kg/m  as calculated from the following:    Height as of this encounter: 1.695 m (5' 6.75\").    Weight as of this encounter: 75.3 kg (166 lb). .      "

## 2023-02-14 NOTE — PROGRESS NOTES
"[facility]    Return OB Note    CC: Return OB     Subjective:  Brenda is a 27 year old  at 37w6d   Denies vaginal bleeding, loss of fluid, or regular contractions. Good fetal movement.  Complaints today: None     Objective:  /73 (BP Location: Right arm, Patient Position: Sitting, Cuff Size: Adult Regular)   Pulse 101   Temp (!) 96.6  F (35.9  C) (Tympanic)   Resp 14   Ht 1.695 m (5' 6.75\")   Wt 75.3 kg (166 lb)   LMP 2022 (Approximate)   BMI 26.19 kg/m      Fundal height: 37cm  FHT: 140bpm  SVE: declines    BSUS: breech    Assessment/Plan:   Encounter Diagnoses   Name Primary?     Encounter for prenatal care in third trimester of first pregnancy Yes     Diet controlled gestational diabetes mellitus (GDM) in third trimester        IUP at 37w6d    -GDMA1: Blood sugars have been good, all normal. Continue serial growth US, following with MFM due to SGA.  EFW 16%, BREECH.   -Elevated BP at NOB: has been normal since. Baseline labs WNL. Taking baby ASA, instructed to stop today  -S/p TDap, flu shot  -GBS negative  -Strict return precautions given    -BSUS confirms persistent breech presentation. Discussed options for ECV vs. Scheduled C section. Discussed risks and benefits. She would like to try an ECV. Will plan to get scheduled ASAP. If successful, would like expectant management. If unsuccessful and no fetal distress, would like to schedule the C section for 39 weeks.         Aida Morgan,     "

## 2023-02-15 ENCOUNTER — PREP FOR PROCEDURE (OUTPATIENT)
Dept: OBGYN | Facility: CLINIC | Age: 28
End: 2023-02-15
Payer: COMMERCIAL

## 2023-02-15 ENCOUNTER — HOSPITAL ENCOUNTER (INPATIENT)
Facility: CLINIC | Age: 28
Setting detail: SURGERY ADMIT
End: 2023-02-15
Attending: OBSTETRICS & GYNECOLOGY | Admitting: OBSTETRICS & GYNECOLOGY
Payer: COMMERCIAL

## 2023-02-15 ENCOUNTER — TELEPHONE (OUTPATIENT)
Dept: OBGYN | Facility: CLINIC | Age: 28
End: 2023-02-15
Payer: COMMERCIAL

## 2023-02-15 RX ORDER — METHYLERGONOVINE MALEATE 0.2 MG/ML
200 INJECTION INTRAVENOUS
Status: CANCELLED | OUTPATIENT
Start: 2023-02-15

## 2023-02-15 RX ORDER — LIDOCAINE 40 MG/G
CREAM TOPICAL
Status: CANCELLED | OUTPATIENT
Start: 2023-02-15

## 2023-02-15 RX ORDER — ACETAMINOPHEN 325 MG/1
975 TABLET ORAL ONCE
Status: CANCELLED | OUTPATIENT
Start: 2023-02-15 | End: 2023-02-15

## 2023-02-15 RX ORDER — OXYTOCIN/0.9 % SODIUM CHLORIDE 30/500 ML
100-340 PLASTIC BAG, INJECTION (ML) INTRAVENOUS CONTINUOUS PRN
Status: CANCELLED | OUTPATIENT
Start: 2023-02-15

## 2023-02-15 RX ORDER — OXYTOCIN/0.9 % SODIUM CHLORIDE 30/500 ML
340 PLASTIC BAG, INJECTION (ML) INTRAVENOUS CONTINUOUS PRN
Status: CANCELLED | OUTPATIENT
Start: 2023-02-15

## 2023-02-15 RX ORDER — CITRIC ACID/SODIUM CITRATE 334-500MG
30 SOLUTION, ORAL ORAL
Status: CANCELLED | OUTPATIENT
Start: 2023-02-15

## 2023-02-15 RX ORDER — OXYTOCIN 10 [USP'U]/ML
10 INJECTION, SOLUTION INTRAMUSCULAR; INTRAVENOUS
Status: CANCELLED | OUTPATIENT
Start: 2023-02-15

## 2023-02-15 RX ORDER — MISOPROSTOL 200 UG/1
800 TABLET ORAL
Status: CANCELLED | OUTPATIENT
Start: 2023-02-15

## 2023-02-15 RX ORDER — TRANEXAMIC ACID 10 MG/ML
1 INJECTION, SOLUTION INTRAVENOUS EVERY 30 MIN PRN
Status: CANCELLED | OUTPATIENT
Start: 2023-02-15

## 2023-02-15 RX ORDER — SODIUM CHLORIDE, SODIUM LACTATE, POTASSIUM CHLORIDE, CALCIUM CHLORIDE 600; 310; 30; 20 MG/100ML; MG/100ML; MG/100ML; MG/100ML
INJECTION, SOLUTION INTRAVENOUS CONTINUOUS
Status: CANCELLED | OUTPATIENT
Start: 2023-02-15

## 2023-02-15 RX ORDER — CARBOPROST TROMETHAMINE 250 UG/ML
250 INJECTION, SOLUTION INTRAMUSCULAR
Status: CANCELLED | OUTPATIENT
Start: 2023-02-15

## 2023-02-15 RX ORDER — MISOPROSTOL 200 UG/1
400 TABLET ORAL
Status: CANCELLED | OUTPATIENT
Start: 2023-02-15

## 2023-02-15 RX ORDER — MISOPROSTOL 100 UG/1
400 TABLET ORAL
Status: CANCELLED | OUTPATIENT
Start: 2023-02-15

## 2023-02-15 RX ORDER — CEFAZOLIN SODIUM 2 G/100ML
2 INJECTION, SOLUTION INTRAVENOUS
Status: CANCELLED | OUTPATIENT
Start: 2023-02-15

## 2023-02-15 RX ORDER — CEFAZOLIN SODIUM 2 G/100ML
2 INJECTION, SOLUTION INTRAVENOUS SEE ADMIN INSTRUCTIONS
Status: CANCELLED | OUTPATIENT
Start: 2023-02-15

## 2023-02-15 NOTE — TELEPHONE ENCOUNTER
"[id number]  Brenda Bacon    You are now scheduled for surgery at [facility].  Below are the details for your surgery.  Please read the \"Preparing for Your Surgery\" instructions and let us know if you have any questions.    Type of surgery:  SECTION    Surgeon:  Aida Morgan DO  Location of surgery: [facility] OR    Date of surgery: 23    Time: 8:30am   Arrival Time: 7:00am    Time can change, to be confirmed a couple of days prior by pre-op surgery nurse.    Pre-Op Appt Date: Last OB visit  Post-Op Appt Date: 23 at 11:15am with Aida Morgan DO     Packet sent out: Yes at next appt  Pre-cert/Authorization completed:  TBD by Financial Securing Office.   MA Sterilization/Hysterectomy Acknowledgment Consent signed: Not Applicable    [facility]  [phone]    Fax: [phone]  Same Day Surgery [phone]  Fax: [phone]  [facility] [phone]    "

## 2023-02-16 NOTE — PROGRESS NOTES
OB/GYN Progress Note:    Patient discussed with  and considered her options for external cephalic version vs. Scheduled C section for breech presentation. She would like to just have a scheduled C section. Discussed risks and benefits again, as well as expected post operative course. All questions answered. Will schedule for C section at 39 weeks, 2/23.     Aida Morgan,

## 2023-02-17 ENCOUNTER — NURSE TRIAGE (OUTPATIENT)
Dept: NURSING | Facility: CLINIC | Age: 28
End: 2023-02-17
Payer: COMMERCIAL

## 2023-02-17 ENCOUNTER — HOSPITAL ENCOUNTER (INPATIENT)
Facility: CLINIC | Age: 28
LOS: 2 days | Discharge: HOME | End: 2023-02-20
Attending: OBSTETRICS & GYNECOLOGY | Admitting: OBSTETRICS & GYNECOLOGY
Payer: COMMERCIAL

## 2023-02-17 DIAGNOSIS — Z34.03 ENCOUNTER FOR PRENATAL CARE IN THIRD TRIMESTER OF FIRST PREGNANCY: ICD-10-CM

## 2023-02-17 DIAGNOSIS — O24.410 DIET CONTROLLED GESTATIONAL DIABETES MELLITUS (GDM) IN THIRD TRIMESTER: Primary | ICD-10-CM

## 2023-02-17 DIAGNOSIS — Z98.891 S/P CESAREAN SECTION: ICD-10-CM

## 2023-02-17 PROCEDURE — G0463 HOSPITAL OUTPT CLINIC VISIT: HCPCS

## 2023-02-17 RX ORDER — LIDOCAINE 40 MG/G
CREAM TOPICAL
Status: DISCONTINUED | OUTPATIENT
Start: 2023-02-17 | End: 2023-02-18 | Stop reason: HOSPADM

## 2023-02-17 ASSESSMENT — ACTIVITIES OF DAILY LIVING (ADL)
ADLS_ACUITY_SCORE: 35
DOING_ERRANDS_INDEPENDENTLY_DIFFICULTY: NO
DRESSING/BATHING_DIFFICULTY: NO
WEAR_GLASSES_OR_BLIND: YES
CHANGE_IN_FUNCTIONAL_STATUS_SINCE_ONSET_OF_CURRENT_ILLNESS/INJURY: NO
TOILETING_ISSUES: NO
FALL_HISTORY_WITHIN_LAST_SIX_MONTHS: NO
DIFFICULTY_COMMUNICATING: NO
WALKING_OR_CLIMBING_STAIRS_DIFFICULTY: NO
DIFFICULTY_EATING/SWALLOWING: NO
HEARING_DIFFICULTY_OR_DEAF: NO
CONCENTRATING,_REMEMBERING_OR_MAKING_DECISIONS_DIFFICULTY: NO

## 2023-02-18 ENCOUNTER — HEALTH MAINTENANCE LETTER (OUTPATIENT)
Age: 28
End: 2023-02-18

## 2023-02-18 ENCOUNTER — ANESTHESIA (OUTPATIENT)
Dept: OBGYN | Facility: CLINIC | Age: 28
End: 2023-02-18
Payer: COMMERCIAL

## 2023-02-18 ENCOUNTER — ANESTHESIA EVENT (OUTPATIENT)
Dept: OBGYN | Facility: CLINIC | Age: 28
End: 2023-02-18
Payer: COMMERCIAL

## 2023-02-18 PROBLEM — O24.410 DIET CONTROLLED GESTATIONAL DIABETES MELLITUS (GDM) IN THIRD TRIMESTER: Status: ACTIVE | Noted: 2022-12-20

## 2023-02-18 PROBLEM — Z34.03 ENCOUNTER FOR PRENATAL CARE IN THIRD TRIMESTER OF FIRST PREGNANCY: Status: ACTIVE | Noted: 2023-02-18

## 2023-02-18 LAB
ABO/RH(D): NORMAL
ANTIBODY SCREEN: NEGATIVE
GLUCOSE BLDC GLUCOMTR-MCNC: 105 MG/DL (ref 70–99)
GLUCOSE BLDC GLUCOMTR-MCNC: 89 MG/DL (ref 70–99)
HGB BLD-MCNC: 13.6 G/DL (ref 11.7–15.7)
SPECIMEN EXPIRATION DATE: NORMAL
T PALLIDUM AB SER QL: NONREACTIVE

## 2023-02-18 PROCEDURE — 59514 CESAREAN DELIVERY ONLY: CPT | Mod: 80 | Performed by: FAMILY MEDICINE

## 2023-02-18 PROCEDURE — C9290 INJ, BUPIVACAINE LIPOSOME: HCPCS | Performed by: NURSE ANESTHETIST, CERTIFIED REGISTERED

## 2023-02-18 PROCEDURE — 86901 BLOOD TYPING SEROLOGIC RH(D): CPT | Performed by: OBSTETRICS & GYNECOLOGY

## 2023-02-18 PROCEDURE — 271N000001 HC OR GENERAL SUPPLY NON-STERILE: Performed by: OBSTETRICS & GYNECOLOGY

## 2023-02-18 PROCEDURE — 710N000010 HC RECOVERY PHASE 1, LEVEL 2, PER MIN: Performed by: OBSTETRICS & GYNECOLOGY

## 2023-02-18 PROCEDURE — 86780 TREPONEMA PALLIDUM: CPT | Performed by: OBSTETRICS & GYNECOLOGY

## 2023-02-18 PROCEDURE — 258N000003 HC RX IP 258 OP 636: Performed by: OBSTETRICS & GYNECOLOGY

## 2023-02-18 PROCEDURE — 120N000001 HC R&B MED SURG/OB

## 2023-02-18 PROCEDURE — 59510 CESAREAN DELIVERY: CPT | Performed by: OBSTETRICS & GYNECOLOGY

## 2023-02-18 PROCEDURE — 86850 RBC ANTIBODY SCREEN: CPT | Performed by: OBSTETRICS & GYNECOLOGY

## 2023-02-18 PROCEDURE — 370N000017 HC ANESTHESIA TECHNICAL FEE, PER MIN: Performed by: OBSTETRICS & GYNECOLOGY

## 2023-02-18 PROCEDURE — 258N000003 HC RX IP 258 OP 636: Performed by: NURSE ANESTHETIST, CERTIFIED REGISTERED

## 2023-02-18 PROCEDURE — 272N000001 HC OR GENERAL SUPPLY STERILE: Performed by: OBSTETRICS & GYNECOLOGY

## 2023-02-18 PROCEDURE — 82962 GLUCOSE BLOOD TEST: CPT

## 2023-02-18 PROCEDURE — 250N000011 HC RX IP 250 OP 636: Performed by: OBSTETRICS & GYNECOLOGY

## 2023-02-18 PROCEDURE — 250N000011 HC RX IP 250 OP 636: Performed by: NURSE ANESTHETIST, CERTIFIED REGISTERED

## 2023-02-18 PROCEDURE — 250N000009 HC RX 250: Performed by: OBSTETRICS & GYNECOLOGY

## 2023-02-18 PROCEDURE — 360N000076 HC SURGERY LEVEL 3, PER MIN: Performed by: OBSTETRICS & GYNECOLOGY

## 2023-02-18 PROCEDURE — 250N000013 HC RX MED GY IP 250 OP 250 PS 637: Performed by: OBSTETRICS & GYNECOLOGY

## 2023-02-18 PROCEDURE — 85018 HEMOGLOBIN: CPT | Performed by: OBSTETRICS & GYNECOLOGY

## 2023-02-18 RX ORDER — NICOTINE POLACRILEX 4 MG
15-30 LOZENGE BUCCAL
Status: DISCONTINUED | OUTPATIENT
Start: 2023-02-18 | End: 2023-02-20 | Stop reason: HOSPADM

## 2023-02-18 RX ORDER — HYDROCORTISONE 25 MG/G
CREAM TOPICAL 3 TIMES DAILY PRN
Status: DISCONTINUED | OUTPATIENT
Start: 2023-02-18 | End: 2023-02-20 | Stop reason: HOSPADM

## 2023-02-18 RX ORDER — DEXTROSE MONOHYDRATE 25 G/50ML
25-50 INJECTION, SOLUTION INTRAVENOUS
Status: DISCONTINUED | OUTPATIENT
Start: 2023-02-18 | End: 2023-02-20 | Stop reason: HOSPADM

## 2023-02-18 RX ORDER — NALOXONE HYDROCHLORIDE 0.4 MG/ML
0.4 INJECTION, SOLUTION INTRAMUSCULAR; INTRAVENOUS; SUBCUTANEOUS
Status: DISCONTINUED | OUTPATIENT
Start: 2023-02-18 | End: 2023-02-20

## 2023-02-18 RX ORDER — CEFAZOLIN SODIUM/WATER 2 G/20 ML
2 SYRINGE (ML) INTRAVENOUS
Status: DISCONTINUED | OUTPATIENT
Start: 2023-02-18 | End: 2023-02-18 | Stop reason: HOSPADM

## 2023-02-18 RX ORDER — CITRIC ACID/SODIUM CITRATE 334-500MG
30 SOLUTION, ORAL ORAL
Status: COMPLETED | OUTPATIENT
Start: 2023-02-18 | End: 2023-02-18

## 2023-02-18 RX ORDER — SIMETHICONE 80 MG
80 TABLET,CHEWABLE ORAL 4 TIMES DAILY PRN
Status: DISCONTINUED | OUTPATIENT
Start: 2023-02-18 | End: 2023-02-20 | Stop reason: HOSPADM

## 2023-02-18 RX ORDER — ACETAMINOPHEN 325 MG/1
975 TABLET ORAL EVERY 6 HOURS
Status: DISCONTINUED | OUTPATIENT
Start: 2023-02-18 | End: 2023-02-20 | Stop reason: HOSPADM

## 2023-02-18 RX ORDER — TRANEXAMIC ACID 10 MG/ML
1 INJECTION, SOLUTION INTRAVENOUS EVERY 30 MIN PRN
Status: DISCONTINUED | OUTPATIENT
Start: 2023-02-18 | End: 2023-02-18 | Stop reason: HOSPADM

## 2023-02-18 RX ORDER — ONDANSETRON 4 MG/1
4 TABLET, ORALLY DISINTEGRATING ORAL EVERY 6 HOURS PRN
Status: DISCONTINUED | OUTPATIENT
Start: 2023-02-18 | End: 2023-02-20 | Stop reason: HOSPADM

## 2023-02-18 RX ORDER — OXYTOCIN 10 [USP'U]/ML
10 INJECTION, SOLUTION INTRAMUSCULAR; INTRAVENOUS
Status: DISCONTINUED | OUTPATIENT
Start: 2023-02-18 | End: 2023-02-18 | Stop reason: HOSPADM

## 2023-02-18 RX ORDER — TRANEXAMIC ACID 10 MG/ML
1 INJECTION, SOLUTION INTRAVENOUS EVERY 30 MIN PRN
Status: DISCONTINUED | OUTPATIENT
Start: 2023-02-18 | End: 2023-02-20 | Stop reason: HOSPADM

## 2023-02-18 RX ORDER — METOCLOPRAMIDE HYDROCHLORIDE 5 MG/ML
10 INJECTION INTRAMUSCULAR; INTRAVENOUS EVERY 6 HOURS PRN
Status: DISCONTINUED | OUTPATIENT
Start: 2023-02-18 | End: 2023-02-20 | Stop reason: HOSPADM

## 2023-02-18 RX ORDER — CARBOPROST TROMETHAMINE 250 UG/ML
250 INJECTION, SOLUTION INTRAMUSCULAR
Status: DISCONTINUED | OUTPATIENT
Start: 2023-02-18 | End: 2023-02-18 | Stop reason: HOSPADM

## 2023-02-18 RX ORDER — CEFAZOLIN SODIUM/WATER 2 G/20 ML
2 SYRINGE (ML) INTRAVENOUS SEE ADMIN INSTRUCTIONS
Status: DISCONTINUED | OUTPATIENT
Start: 2023-02-18 | End: 2023-02-18 | Stop reason: HOSPADM

## 2023-02-18 RX ORDER — OXYTOCIN/0.9 % SODIUM CHLORIDE 30/500 ML
340 PLASTIC BAG, INJECTION (ML) INTRAVENOUS CONTINUOUS PRN
Status: DISCONTINUED | OUTPATIENT
Start: 2023-02-18 | End: 2023-02-20 | Stop reason: HOSPADM

## 2023-02-18 RX ORDER — ACETAMINOPHEN 325 MG/1
975 TABLET ORAL ONCE
Status: COMPLETED | OUTPATIENT
Start: 2023-02-18 | End: 2023-02-18

## 2023-02-18 RX ORDER — ONDANSETRON 2 MG/ML
INJECTION INTRAMUSCULAR; INTRAVENOUS PRN
Status: DISCONTINUED | OUTPATIENT
Start: 2023-02-18 | End: 2023-02-18

## 2023-02-18 RX ORDER — PROCHLORPERAZINE MALEATE 5 MG
10 TABLET ORAL EVERY 6 HOURS PRN
Status: DISCONTINUED | OUTPATIENT
Start: 2023-02-18 | End: 2023-02-20 | Stop reason: HOSPADM

## 2023-02-18 RX ORDER — OXYTOCIN/0.9 % SODIUM CHLORIDE 30/500 ML
340 PLASTIC BAG, INJECTION (ML) INTRAVENOUS CONTINUOUS PRN
Status: COMPLETED | OUTPATIENT
Start: 2023-02-18 | End: 2023-02-18

## 2023-02-18 RX ORDER — SODIUM CHLORIDE, SODIUM LACTATE, POTASSIUM CHLORIDE, CALCIUM CHLORIDE 600; 310; 30; 20 MG/100ML; MG/100ML; MG/100ML; MG/100ML
INJECTION, SOLUTION INTRAVENOUS CONTINUOUS
Status: DISCONTINUED | OUTPATIENT
Start: 2023-02-18 | End: 2023-02-18 | Stop reason: HOSPADM

## 2023-02-18 RX ORDER — ONDANSETRON 2 MG/ML
4 INJECTION INTRAMUSCULAR; INTRAVENOUS EVERY 6 HOURS PRN
Status: DISCONTINUED | OUTPATIENT
Start: 2023-02-18 | End: 2023-02-20 | Stop reason: HOSPADM

## 2023-02-18 RX ORDER — LIDOCAINE 40 MG/G
CREAM TOPICAL
Status: DISCONTINUED | OUTPATIENT
Start: 2023-02-18 | End: 2023-02-18 | Stop reason: HOSPADM

## 2023-02-18 RX ORDER — AMOXICILLIN 250 MG
1 CAPSULE ORAL 2 TIMES DAILY
Status: DISCONTINUED | OUTPATIENT
Start: 2023-02-18 | End: 2023-02-20 | Stop reason: HOSPADM

## 2023-02-18 RX ORDER — BUPIVACAINE HYDROCHLORIDE 2.5 MG/ML
INJECTION, SOLUTION EPIDURAL; INFILTRATION; INTRACAUDAL PRN
Status: DISCONTINUED | OUTPATIENT
Start: 2023-02-18 | End: 2023-02-18

## 2023-02-18 RX ORDER — NALOXONE HYDROCHLORIDE 0.4 MG/ML
0.2 INJECTION, SOLUTION INTRAMUSCULAR; INTRAVENOUS; SUBCUTANEOUS
Status: DISCONTINUED | OUTPATIENT
Start: 2023-02-18 | End: 2023-02-20

## 2023-02-18 RX ORDER — OXYTOCIN/0.9 % SODIUM CHLORIDE 30/500 ML
100-340 PLASTIC BAG, INJECTION (ML) INTRAVENOUS CONTINUOUS PRN
Status: DISCONTINUED | OUTPATIENT
Start: 2023-02-18 | End: 2023-02-20 | Stop reason: HOSPADM

## 2023-02-18 RX ORDER — OXYCODONE HYDROCHLORIDE 5 MG/1
5 TABLET ORAL EVERY 4 HOURS PRN
Status: DISCONTINUED | OUTPATIENT
Start: 2023-02-18 | End: 2023-02-20 | Stop reason: HOSPADM

## 2023-02-18 RX ORDER — OXYTOCIN 10 [USP'U]/ML
10 INJECTION, SOLUTION INTRAMUSCULAR; INTRAVENOUS
Status: DISCONTINUED | OUTPATIENT
Start: 2023-02-18 | End: 2023-02-20 | Stop reason: HOSPADM

## 2023-02-18 RX ORDER — CARBOPROST TROMETHAMINE 250 UG/ML
250 INJECTION, SOLUTION INTRAMUSCULAR
Status: DISCONTINUED | OUTPATIENT
Start: 2023-02-18 | End: 2023-02-20 | Stop reason: HOSPADM

## 2023-02-18 RX ORDER — MISOPROSTOL 200 UG/1
400 TABLET ORAL
Status: DISCONTINUED | OUTPATIENT
Start: 2023-02-18 | End: 2023-02-20 | Stop reason: HOSPADM

## 2023-02-18 RX ORDER — KETOROLAC TROMETHAMINE 30 MG/ML
30 INJECTION, SOLUTION INTRAMUSCULAR; INTRAVENOUS EVERY 6 HOURS
Status: DISCONTINUED | OUTPATIENT
Start: 2023-02-18 | End: 2023-02-18

## 2023-02-18 RX ORDER — BUPIVACAINE HYDROCHLORIDE 7.5 MG/ML
INJECTION, SOLUTION INTRASPINAL PRN
Status: DISCONTINUED | OUTPATIENT
Start: 2023-02-18 | End: 2023-02-18

## 2023-02-18 RX ORDER — MISOPROSTOL 200 UG/1
400 TABLET ORAL
Status: DISCONTINUED | OUTPATIENT
Start: 2023-02-18 | End: 2023-02-18 | Stop reason: HOSPADM

## 2023-02-18 RX ORDER — DEXTROSE, SODIUM CHLORIDE, SODIUM LACTATE, POTASSIUM CHLORIDE, AND CALCIUM CHLORIDE 5; .6; .31; .03; .02 G/100ML; G/100ML; G/100ML; G/100ML; G/100ML
INJECTION, SOLUTION INTRAVENOUS CONTINUOUS
Status: DISCONTINUED | OUTPATIENT
Start: 2023-02-18 | End: 2023-02-20 | Stop reason: HOSPADM

## 2023-02-18 RX ORDER — SODIUM CHLORIDE, SODIUM LACTATE, POTASSIUM CHLORIDE, CALCIUM CHLORIDE 600; 310; 30; 20 MG/100ML; MG/100ML; MG/100ML; MG/100ML
INJECTION, SOLUTION INTRAVENOUS CONTINUOUS PRN
Status: DISCONTINUED | OUTPATIENT
Start: 2023-02-18 | End: 2023-02-18

## 2023-02-18 RX ORDER — METHYLERGONOVINE MALEATE 0.2 MG/ML
200 INJECTION INTRAVENOUS
Status: DISCONTINUED | OUTPATIENT
Start: 2023-02-18 | End: 2023-02-18 | Stop reason: HOSPADM

## 2023-02-18 RX ORDER — METHYLERGONOVINE MALEATE 0.2 MG/ML
200 INJECTION INTRAVENOUS
Status: DISCONTINUED | OUTPATIENT
Start: 2023-02-18 | End: 2023-02-20 | Stop reason: HOSPADM

## 2023-02-18 RX ORDER — BISACODYL 10 MG
10 SUPPOSITORY, RECTAL RECTAL DAILY PRN
Status: DISCONTINUED | OUTPATIENT
Start: 2023-02-20 | End: 2023-02-20 | Stop reason: HOSPADM

## 2023-02-18 RX ORDER — AMOXICILLIN 250 MG
2 CAPSULE ORAL 2 TIMES DAILY
Status: DISCONTINUED | OUTPATIENT
Start: 2023-02-18 | End: 2023-02-20 | Stop reason: HOSPADM

## 2023-02-18 RX ORDER — PROCHLORPERAZINE 25 MG
25 SUPPOSITORY, RECTAL RECTAL EVERY 12 HOURS PRN
Status: DISCONTINUED | OUTPATIENT
Start: 2023-02-18 | End: 2023-02-20 | Stop reason: HOSPADM

## 2023-02-18 RX ORDER — IBUPROFEN 800 MG/1
800 TABLET, FILM COATED ORAL EVERY 6 HOURS
Status: DISCONTINUED | OUTPATIENT
Start: 2023-02-18 | End: 2023-02-20 | Stop reason: HOSPADM

## 2023-02-18 RX ORDER — IBUPROFEN 800 MG/1
800 TABLET, FILM COATED ORAL EVERY 6 HOURS
Status: DISCONTINUED | OUTPATIENT
Start: 2023-02-19 | End: 2023-02-18

## 2023-02-18 RX ORDER — MORPHINE SULFATE 1 MG/ML
INJECTION, SOLUTION EPIDURAL; INTRATHECAL; INTRAVENOUS PRN
Status: DISCONTINUED | OUTPATIENT
Start: 2023-02-18 | End: 2023-02-18

## 2023-02-18 RX ORDER — METOCLOPRAMIDE 5 MG/1
10 TABLET ORAL EVERY 6 HOURS PRN
Status: DISCONTINUED | OUTPATIENT
Start: 2023-02-18 | End: 2023-02-20 | Stop reason: HOSPADM

## 2023-02-18 RX ORDER — LIDOCAINE 40 MG/G
CREAM TOPICAL
Status: DISCONTINUED | OUTPATIENT
Start: 2023-02-18 | End: 2023-02-20 | Stop reason: HOSPADM

## 2023-02-18 RX ORDER — FENTANYL CITRATE 50 UG/ML
50-100 INJECTION, SOLUTION INTRAMUSCULAR; INTRAVENOUS
Status: DISCONTINUED | OUTPATIENT
Start: 2023-02-18 | End: 2023-02-20

## 2023-02-18 RX ORDER — MODIFIED LANOLIN
OINTMENT (GRAM) TOPICAL
Status: DISCONTINUED | OUTPATIENT
Start: 2023-02-18 | End: 2023-02-20 | Stop reason: HOSPADM

## 2023-02-18 RX ADMIN — Medication 100 ML/HR: at 16:46

## 2023-02-18 RX ADMIN — ACETAMINOPHEN 975 MG: 325 TABLET, FILM COATED ORAL at 15:28

## 2023-02-18 RX ADMIN — SENNOSIDES AND DOCUSATE SODIUM 1 TABLET: 50; 8.6 TABLET ORAL at 21:01

## 2023-02-18 RX ADMIN — FENTANYL CITRATE 100 MCG: 50 INJECTION INTRAMUSCULAR; INTRAVENOUS at 01:00

## 2023-02-18 RX ADMIN — IBUPROFEN 800 MG: 800 TABLET, FILM COATED ORAL at 18:39

## 2023-02-18 RX ADMIN — FENTANYL CITRATE 100 MCG: 50 INJECTION INTRAMUSCULAR; INTRAVENOUS at 02:01

## 2023-02-18 RX ADMIN — ACETAMINOPHEN 975 MG: 325 TABLET, FILM COATED ORAL at 09:22

## 2023-02-18 RX ADMIN — SODIUM CITRATE AND CITRIC ACID MONOHYDRATE 30 ML: 500; 334 SOLUTION ORAL at 03:30

## 2023-02-18 RX ADMIN — MORPHINE SULFATE 0.2 MG: 1 INJECTION EPIDURAL; INTRATHECAL; INTRAVENOUS at 04:45

## 2023-02-18 RX ADMIN — SODIUM CHLORIDE, POTASSIUM CHLORIDE, SODIUM LACTATE AND CALCIUM CHLORIDE: 600; 310; 30; 20 INJECTION, SOLUTION INTRAVENOUS at 00:58

## 2023-02-18 RX ADMIN — SENNOSIDES AND DOCUSATE SODIUM 1 TABLET: 50; 8.6 TABLET ORAL at 09:22

## 2023-02-18 RX ADMIN — BUPIVACAINE HYDROCHLORIDE 40 ML: 2.5 INJECTION, SOLUTION EPIDURAL; INFILTRATION; INTRACAUDAL; PERINEURAL at 06:00

## 2023-02-18 RX ADMIN — ACETAMINOPHEN 975 MG: 325 TABLET, FILM COATED ORAL at 21:01

## 2023-02-18 RX ADMIN — PHENYLEPHRINE HYDROCHLORIDE 0.2 MCG/KG/MIN: 10 INJECTION INTRAVENOUS at 04:50

## 2023-02-18 RX ADMIN — ACETAMINOPHEN 975 MG: 325 TABLET, FILM COATED ORAL at 03:30

## 2023-02-18 RX ADMIN — Medication 340 ML/HR: at 05:12

## 2023-02-18 RX ADMIN — BUPIVACAINE HYDROCHLORIDE IN DEXTROSE 1.2 ML: 7.5 INJECTION, SOLUTION SUBARACHNOID at 04:45

## 2023-02-18 RX ADMIN — ONDANSETRON 4 MG: 2 INJECTION INTRAMUSCULAR; INTRAVENOUS at 04:32

## 2023-02-18 RX ADMIN — IBUPROFEN 800 MG: 800 TABLET, FILM COATED ORAL at 13:06

## 2023-02-18 RX ADMIN — SODIUM CHLORIDE, POTASSIUM CHLORIDE, SODIUM LACTATE AND CALCIUM CHLORIDE: 600; 310; 30; 20 INJECTION, SOLUTION INTRAVENOUS at 04:34

## 2023-02-18 RX ADMIN — BUPIVACAINE 20 ML: 13.3 INJECTION, SUSPENSION, LIPOSOMAL INFILTRATION at 06:01

## 2023-02-18 ASSESSMENT — ACTIVITIES OF DAILY LIVING (ADL)
ADLS_ACUITY_SCORE: 20

## 2023-02-18 NOTE — OR NURSING
Transfer from  pacu to Room 3055  Transferred with bed    S: 26 y/o female  S/P Csection       Anesthesia Type:  spinal       Surgeon:  Dr. jaffe       Allergies:  See Medication Reconciliation Record       DNR: NO  (Yes,No)    B:  Pertinent Medical History:   Past Medical History:   Diagnosis Date     Cat-scratch disease 4/20/2004     Lyme disease 11-02    also cat scratch disease        (CHF; Heart Disease; Lung Disease; Chronic Pain; Diabetes; Other (Comment)          Surgical History:    Past Surgical History:   Procedure Laterality Date     EXCISE GANGLION WRIST  2/8/2013    Procedure: EXCISE GANGLION WRIST;  excision left wrist dorsal cyst;  Surgeon: Karina Webb MD;  Location: [facility]     EXCISE GANGLION WRIST Right 11/26/2014    Procedure: EXCISE GANGLION WRIST;  Surgeon: Danis Hodge MD;  Location: [facility] OR       A:  EBL: 400        IVF:  600        UOP:  150        NPO:  ___Yes ___No         Vomiting:  ___Yes ___No         Drainage:         Skin Integrity: normal (Normal; Pressure Ulcer (Location)           See PACU record for ongoing assessment, vital signs and pain assessment.    R: Post-Op vitals and assessments as ordered/indicated per patient's condition.       Follow Post-Op orders and notify Physician prn.       Continue to involve patient/family in plan of care and discharge planning.       Reinforce Pre-Operative education.       Implement skin safety interventions as appropriate.    Handoff report to CHARLEE North    Name: Merari Guerrero RN on 2/18/2023 at 7:00 AM

## 2023-02-18 NOTE — TELEPHONE ENCOUNTER
OB Triage Call    Is patient's OB/Midwife with the [facility] or [facility]? [facility]- Proceed with triage     Reason for call: Labor    Assessment: 14-15 hours intermittent contractions.  Last 1 hour 5-7 minutes apart lasting 1 minute.  Moderate to strong intensity.  Baby is breech.     Plan: L&D    Patient plans to deliver at [facility]    Patient's primary OB Provider is Cathy  .    Per protocol recommendations Patient to be evaluated in L&D. Patient's primary OB is [facility].  Labor and delivery at [facility] ([phone]) notified of patient's pending arrival.  Report given to Mary Anne.      Is patient's delivering hospital on divert? Yes- If patient's hospital of choice is on divert, explain to patient their current hospital of choice is not accepting patients. Review other [facility] locations with patient. Patient's second hospital of choice Delivering Hospital: [facility] ([phone]).  L&D notified of patient's pending arrival. Report given to Mary Anne.  Route encounter to primary OB provider as FYI.       38w2d    Estimated Date of Delivery: Mar 1, 2023        OB History    Para Term  AB Living   1 0 0 0 0 0   SAB IAB Ectopic Multiple Live Births   0 0 0 0 0      # Outcome Date GA Lbr Tan/2nd Weight Sex Delivery Anes PTL Lv   1 Current                No results found for: GBS       Aisha Charles RN 23 8:45 PM  [facility] Nurse Advisor        Reason for Disposition    [1] First baby (primipara) AND [2] contractions < 6 minutes apart  AND [3] present 2 hours    Additional Information    Negative: Passed out (i.e., lost consciousness, collapsed and was not responding)    Negative: Shock suspected (e.g., cold/pale/clammy skin, too weak to stand, low BP, rapid pulse)    Negative: Difficult to awaken or acting confused (e.g., disoriented, slurred speech)    Negative: [1] SEVERE abdominal pain (e.g., excruciating) AND [2] constant AND [3]  "present > 1 hour    Negative: Severe bleeding (e.g., continuous red blood from vagina, or large blood clots)    Negative: Umbilical cord hanging out of the vagina (shiny, white, curled appearance, \"like telephone cord\")    Negative: Uncontrollable urge to push (i.e., feels like baby is coming out now)    Negative: Can see baby    Negative: Sounds like a life-threatening emergency to the triager    Negative: Pregnant < 37 weeks (i.e., )    Negative: [1] Uncertain delivery date AND [2] possibly pregnant < 37 weeks (i.e., )    Protocols used: PREGNANCY - LABOR-A-AH      "

## 2023-02-18 NOTE — BRIEF OP NOTE
[facility]    Brief Operative Note    Pre-operative diagnosis: Diet controlled gestational diabetes mellitus (GDM) in third trimester [O24.410]  Encounter for prenatal care in third trimester of first pregnancy [Z34.03]  Spontaneous breech delivery, single or unspecified fetus [O32.1XX0]  Post-operative diagnosis Same as pre-operative diagnosis    Procedure: Procedure(s):   SECTION  Surgeon: Surgeon(s) and Role:     * Kendrick Delacruz MD - Primary     * Petra Nielsen MD - Assisting  Anesthesia: Spinal   Estimated Blood Loss: 400 ml    Drains: None  Specimens: * No specimens in log *  Findings:   None.  Complications: None.  Implants: * No implants in log *

## 2023-02-18 NOTE — PROGRESS NOTES
S:  Section Delivery  B: Primary  Section for breech presentation- delivered at 0510  A: Baby delivered, cord clamping delayed for 60-90 seconds, then brought to pre- warmed infant warmer. Infant stimulated and dried. Infant then brought to mother and placed skin to skin for bonding. Apgars 9/9. Educated mother on expected feeding readiness cues and encouraged her to observe for feeding cues. Mother informed that breast feeding assistance would be provided.   R: Mother and baby bonding well. Anticipate first feed within the hour.    Mary Anne Angel RN on 2023 at 6:03 AM

## 2023-02-18 NOTE — PROGRESS NOTES
Pt prepped for . Jewelry removed, skin prepped, given Tylenol, Bicitra, and IVF per MAR. Blood glucose 105. Was given IV Fentanyl twice for pain 7/10 at 0100 and 020. This provided pt some temporary relief. Pt plans to breastfeed.  René here for support. Awaiting surgical crew to bring pt back to OR.    Mary Anne Angel RN on 2023 at 4:01 AM

## 2023-02-18 NOTE — PROGRESS NOTES
Brought to OR at 0434. FHR prior to spinal 145bpm. FHR after spinal 150 bpm.    Mary Anne Angel RN on 2/18/2023 at 4:47 AM

## 2023-02-18 NOTE — PROGRESS NOTES
SVE at 0000: 3cm/90%/breech. Contractions continue Q3-5min. Pt reports pain with contractions 8/10. FHR 135bpm, cat 1 strip. MD at bedside now and confirmed cervical change. As noted, pt last ate at . Will begin preparing pt for  and notify OR crew.    Mary Anne Angel RN on 2023 at 12:16 AM

## 2023-02-18 NOTE — PLAN OF CARE
Goal Outcome Evaluation:      Plan of Care Reviewed With: patient, spouse    Overall Patient Progress: improvingOverall Patient Progress: improving     S-(situation): shift note    B-(background): , primary c/s for breech presentation    A-(assessment): VSS. Denies pain, has been up and walked to BR with SBA, tolerated well. Gottlieb out at 1130, has had good po intake. Unable to void at 1440. Bonding well with baby and gaining confidence with breastfeeding    R-(recommendations): cont routine post c/s cares. Attempt void at 1600, bladder scan if necessary

## 2023-02-18 NOTE — PLAN OF CARE
Goal Outcome Evaluation:      Plan of Care Reviewed With: patient, spouse    Overall Patient Progress: improvingOverall Patient Progress: improving     S-(situation): voiding/passing clots    B-(background): primary c/s 0510, pike removed 1130    A-(assessment): called to pt bathroom at 1600, pt still unable to void, had passed 3-4large clots into pan. Assisted back to bed, steady on feet, no dizziness. Flow light. IV restarted, pitocin started. Bladder scanned, >999ml. Pike cath placed. VSS. Flow continued to be light, no further clots. FF@U  Measured amt of clots was 100ml  Pike output 1200ml  VSS   updated on pt condition at 1620    R-(recommendations): Will leave pike in until morning, pitocin running until 1900

## 2023-02-18 NOTE — ANESTHESIA CARE TRANSFER NOTE
Patient: Brenda Bacon    Procedure: Procedure(s):   SECTION       Diagnosis: Diet controlled gestational diabetes mellitus (GDM) in third trimester [O24.410]  Encounter for prenatal care in third trimester of first pregnancy [Z34.03]  Spontaneous breech delivery, single or unspecified fetus [O32.1XX0]  Diagnosis Additional Information: No value filed.    Anesthesia Type:   Spinal     Note:    Oropharynx: oropharynx clear of all foreign objects and spontaneously breathing  Level of Consciousness: drowsy  Oxygen Supplementation: nasal cannula    Independent Airway: airway patency satisfactory and stable  Dentition: dentition unchanged  Vital Signs Stable: post-procedure vital signs reviewed and stable  Report to RN Given: handoff report given  Patient transferred to: PACU    Handoff Report: Identifed the Patient, Identified the Reponsible Provider, Reviewed the pertinent medical history, Discussed the surgical course, Reviewed Intra-OP anesthesia mangement and issues during anesthesia, Set expectations for post-procedure period and Allowed opportunity for questions and acknowledgement of understanding      Vitals:  Vitals Value Taken Time   BP     Temp     Pulse 88 23 0614   Resp 28 23 0614   SpO2 96 % 23 0614   Vitals shown include unvalidated device data.    Electronically Signed By: SHAHAB Neil CRNA  2023  6:15 AM

## 2023-02-18 NOTE — ANESTHESIA PREPROCEDURE EVALUATION
Anesthesia Pre-Procedure Evaluation    Patient: Brenda Bacon   MRN: [id number] : 1995        Procedure : Procedure(s):   SECTION          Past Medical History:   Diagnosis Date     Cat-scratch disease 2004     Lyme disease     also cat scratch disease      Past Surgical History:   Procedure Laterality Date     EXCISE GANGLION WRIST  2013    Procedure: EXCISE GANGLION WRIST;  excision left wrist dorsal cyst;  Surgeon: Karina Webb MD;  [facility]     EXCISE GANGLION WRIST Right 2014    Procedure: EXCISE GANGLION WRIST;  Surgeon: Danis Hodge MD;  Location: [facility] OR      Allergies   Allergen Reactions     No Known Allergies       Social History     Tobacco Use     Smoking status: Never     Smokeless tobacco: Never     Tobacco comments:     NO NICOTINE EXPOSURE @ HOME   Substance Use Topics     Alcohol use: Not Currently     Comment: social-quit with pregnancy      Wt Readings from Last 1 Encounters:   23 75.3 kg (166 lb)        Anesthesia Evaluation            ROS/MED HX  ENT/Pulmonary:  - neg pulmonary ROS     Neurologic:  - neg neurologic ROS     Cardiovascular:       METS/Exercise Tolerance:     Hematologic:  - neg hematologic  ROS     Musculoskeletal:  - neg musculoskeletal ROS     GI/Hepatic:       Renal/Genitourinary:       Endo: Comment: Diet controlled gestational diabetes   (-) Type II DM   Psychiatric/Substance Use:  - neg psychiatric ROS     Infectious Disease:  - neg infectious disease ROS     Malignancy:  - neg malignancy ROS     Other:  - neg other ROS          Physical Exam    Airway  airway exam normal      Mallampati: II   TM distance: > 3 FB   Neck ROM: full   Mouth opening: > 3 cm    Respiratory Devices and Support         Dental           Cardiovascular   cardiovascular exam normal       Rhythm and rate: regular and normal     Pulmonary   pulmonary exam normal        breath sounds clear to auscultation           OUTSIDE LABS:  CBC:    Lab Results   Component Value Date    WBC 13.8 (H) 11/09/2022    WBC 10.4 07/21/2022    HGB 13.6 02/18/2023    HGB 12.4 11/09/2022    HCT 37.7 11/09/2022    HCT 38.1 07/21/2022     11/09/2022     07/21/2022     BMP:   Lab Results   Component Value Date     08/02/2016    POTASSIUM 3.7 08/02/2016    CHLORIDE 104 08/02/2016    CO2 29 08/02/2016    BUN 11 08/02/2016    CR 0.51 (L) 07/21/2022    CR 0.67 08/02/2016     (H) 02/18/2023     (H) 08/02/2016     COAGS: No results found for: PTT, INR, FIBR  POC:   Lab Results   Component Value Date    HCG Negative 11/26/2014     HEPATIC:   Lab Results   Component Value Date    ALT 45 07/21/2022    AST 26 07/21/2022     OTHER:   Lab Results   Component Value Date    PH 7.5 (H) 07/09/2002    MAYUR 9.0 08/02/2016    TSH 0.64 09/27/2016    CRP <2.9 08/02/2016    SED 8 08/02/2016       Anesthesia Plan    ASA Status:  2   NPO Status:  NPO Appropriate    Anesthesia Type: Spinal.              Consents    Anesthesia Plan(s) and associated risks, benefits, and realistic alternatives discussed. Questions answered and patient/representative(s) expressed understanding.    - Discussed:     - Discussed with:  Patient      - Extended Intubation/Ventilatory Support Discussed: No.      - Patient is DNR/DNI Status: No    Use of blood products discussed: No .     Postoperative Care    Pain management: Oral pain medications, Peripheral nerve block (Continuous).   PONV prophylaxis: Ondansetron (or other 5HT-3)     Comments:    Other Comments: The risks and benefits of anesthesia, and the alternatives where applicable, have been discussed with the patient, and they wish to proceed.            SHAHAB Neil CRNA

## 2023-02-18 NOTE — PROGRESS NOTES
Patient feeling the contractions more intense, breathing through them, rating 7-8/10 pain. Patient up to bathroom and then repositioned.

## 2023-02-18 NOTE — PROGRESS NOTES
S: Triage Arrival  B: Brenda is a 27 y.o.  @ 38w 2d who presents with complaint of contractions Q4min. Reports pain 3/10. Pt notes baby was confirmed breech on Tuesday via U/S. Pt has scheduled  at [place] on 23.  A: EFM applied. FHT's 140bpm with moderate variability, accels present, no decels noted on strip. Contractions Q2-4min. SVE at 2200: 1cm/80%/breech.   R: Notified Dr. Delacruz of pt arrival. Plan to recheck cervix at 0000.   Pt notes last food/drink intake was .    Mary Anne Angel RN on 2023 at 11:04 PM

## 2023-02-18 NOTE — OP NOTE
Procedure Date: 2023    PREOPERATIVE DIAGNOSES:     1.  Pregnancy at term.  2.  Breech presentation.  3.  Labor.    POSTOPERATIVE DIAGNOSES:    1.  Pregnancy at term.  2.  Breech presentation.  3.  Labor.    PROCEDURE:  Primary lower segment transverse .    SURGEON:  Kendrick Delacruz MD    ASSISTANT:  Petra Nielsen MD.    His assistance was requested at concern for malpresentation of the baby and proper delivery assistance.    ANESTHESIA:  Spinal.    COMPLICATIONS:  None.    DRAINS AND PACKS:  None.    ESTIMATED BLOOD LOSS:  421.    URINE: 150.    FINDINGS:  Viable baby boy born at 0510 with Apgars of 9 and 9 at 1 and 5 minutes respectively.    INDICATIONS: Breech presentation in labor.      DESCRIPTION OF PROCEDURE:  After adequate spinal anesthesia, the patient was prepped and draped in the usual sterile fashion, placed in the left lateral tilt position.  Pfannenstiel skin incision made approximately 2 cm above the symphysis, carried down sharply to subcutaneous tissue.  The fascia incised bilaterally in curvilinear fashion.  Rectus muscle  in the midline and anterior peritoneum identified, incised vertically superiorly and inferiorly to the upper edge of the bladder.    The vesicouterine peritoneum was identified, incised bilaterally in curvilinear fashion.  Horizontal uterine incision was made.  Baby delivered from a eladio breech presentation.  Mouth and nares suctioned, cord doubly clamped and cut and baby passed off to the pediatric team in assistance with findings noted above.    Placenta was spontaneously extracted.  Uterus exteriorized and wiped free of remaining clot and membrane and then closed in 2 layers of 0 Vicryl, followed by 0 PDS.  Anterior peritoneum closed in 3 layers, 3-0 Vicryl.  Fascia inspected and closed with 0 Vicryl.  Subcutaneous tissue irrigated with warm normal saline solution and skin edges reapproximated with 4-0 Vicryl.     Estimated blood loss 421.  Sponge and  needle count correct and the conclusion of the case.  The patient tolerated the procedure well and was transferred to recovery room in satisfactory condition.  Baby with the family.    Kendrick Delacruz MD        D: 2023   T: 2023   MT: [id number]    Name:     CAN THORPE  MRN:      [id number]        Account:        [id number]   :      1995           Procedure Date: 2023     Document: [id number]

## 2023-02-18 NOTE — ANESTHESIA PROCEDURE NOTES
"Intrathecal Procedure Note    Pre-Procedure   Staff -        CRNA: Stephanie Singleton APRN CRNA       Performed By: CRNA       Location: OR       Pre-Anesthestic Checklist: patient identified, IV checked, risks and benefits discussed, informed consent, monitors and equipment checked and pre-op evaluation  Timeout:       Correct Patient: Yes        Correct Procedure: Yes        Correct Site: Yes        Correct Position: Yes   Procedure Documentation  Procedure: intrathecal       Patient Position: sitting       Patient Prep/Sterile Barriers: sterile gloves, mask, patient draped       Skin prep: Betadine       Insertion Site: L3-4. (midline approach).       Spinal Needle Type: Sho tip       Introducer used       Introducer: 20 G       # of attempts: 1 and  # of redirects:  1    Assessment/Narrative         Paresthesias: No.       Sensory Level: T4       CSF fluid: clear.       Opening pressure was cmH2O while  Sitting.       Comments:  Spinal placed as per note above without difficulty.       FOR [facility] ([facility]) ONLY:   Pain Team Contact information: please page the Pain Team Via [employer]. Search \"Pain\". During daytime hours, please page the attending first. At night please page the resident first.    "

## 2023-02-19 LAB
HGB BLD-MCNC: 10.6 G/DL (ref 11.7–15.7)
HOLD SPECIMEN: NORMAL

## 2023-02-19 PROCEDURE — 250N000013 HC RX MED GY IP 250 OP 250 PS 637: Performed by: OBSTETRICS & GYNECOLOGY

## 2023-02-19 PROCEDURE — 85018 HEMOGLOBIN: CPT | Performed by: OBSTETRICS & GYNECOLOGY

## 2023-02-19 PROCEDURE — 120N000001 HC R&B MED SURG/OB

## 2023-02-19 PROCEDURE — 36415 COLL VENOUS BLD VENIPUNCTURE: CPT | Performed by: OBSTETRICS & GYNECOLOGY

## 2023-02-19 RX ADMIN — IBUPROFEN 800 MG: 800 TABLET, FILM COATED ORAL at 14:46

## 2023-02-19 RX ADMIN — IBUPROFEN 800 MG: 800 TABLET, FILM COATED ORAL at 07:41

## 2023-02-19 RX ADMIN — IBUPROFEN 800 MG: 800 TABLET, FILM COATED ORAL at 00:53

## 2023-02-19 RX ADMIN — SENNOSIDES AND DOCUSATE SODIUM 1 TABLET: 50; 8.6 TABLET ORAL at 09:35

## 2023-02-19 RX ADMIN — ACETAMINOPHEN 975 MG: 325 TABLET, FILM COATED ORAL at 16:45

## 2023-02-19 RX ADMIN — ACETAMINOPHEN 975 MG: 325 TABLET, FILM COATED ORAL at 09:35

## 2023-02-19 RX ADMIN — IBUPROFEN 800 MG: 800 TABLET, FILM COATED ORAL at 20:10

## 2023-02-19 RX ADMIN — SENNOSIDES AND DOCUSATE SODIUM 2 TABLET: 50; 8.6 TABLET ORAL at 20:11

## 2023-02-19 RX ADMIN — ACETAMINOPHEN 975 MG: 325 TABLET, FILM COATED ORAL at 22:15

## 2023-02-19 RX ADMIN — ACETAMINOPHEN 975 MG: 325 TABLET, FILM COATED ORAL at 03:38

## 2023-02-19 ASSESSMENT — ACTIVITIES OF DAILY LIVING (ADL)
ADLS_ACUITY_SCORE: 21
ADLS_ACUITY_SCORE: 21
ADLS_ACUITY_SCORE: 20
ADLS_ACUITY_SCORE: 20
ADLS_ACUITY_SCORE: 21
ADLS_ACUITY_SCORE: 20
ADLS_ACUITY_SCORE: 20
ADLS_ACUITY_SCORE: 21
ADLS_ACUITY_SCORE: 20
ADLS_ACUITY_SCORE: 20
ADLS_ACUITY_SCORE: 21
ADLS_ACUITY_SCORE: 20

## 2023-02-19 NOTE — PLAN OF CARE
S: Shift review   B:Brenda is a  who delivered via  on 23  A: VSS. Pt is ambulating independently. Pain well controlled with p.o. pain meds. Fundus firm. Pt had minimal bleeding overnight and passed few small clots. Great urine output overnight. Pt breastfeeding with assistance  R: Continue with PP cares. Gottlieb to be removed this AM     Mary Anne Angel RN on 2023 at 7:56 AM

## 2023-02-19 NOTE — ANESTHESIA POSTPROCEDURE EVALUATION
Patient: Brenda Bacon    Procedure: Procedure(s):   SECTION       Anesthesia Type:  Spinal    Note:  Anesthesia Post Evaluation    Last vitals:  Vitals Value Taken Time   /85 23 0650   Temp 97.8  F (36.6  C) 23 0650   Pulse 81 23 0652   Resp 18 23 0652   SpO2 99 % 23 0652   Vitals shown include unvalidated device data.    Electronically Signed By: SHAHAB Neil CRNA  2023  10:59 AM

## 2023-02-19 NOTE — PROGRESS NOTES
[facility]  Obstetrics Post-Op / Progress Note         Assessment and Plan:    Assessment:   Post-operative day #1  Low transverse primary  section for breech  L&D complications: None      Doing well.  Clean wound without signs of infection.  Normal healing wound.  No immediate surgical complications identified.  No excessive bleeding, she has had a couple clots (not significantly large)  Pit was restarted yesterday, HGB stable  Pain well-controlled.      Plan:   Ambulation encouraged  Monitor wound for signs of infection  Pain control measures as needed            Interval History:   Doing well.  Pain is controlled.  No fevers.  No history of wound drainage, warmth or significant erythema.  Good appetite.  Denies chest pain, shortness of breath, nausea or vomiting.  Breastfeeding well.          Significant Problems:    None          Review of Systems:    The patient denies any chest pain, shortness of breath, excessive pain, fever, chills, purulent drainage from the wound, nausea or vomiting.          Medications:   All medications related to the patient's surgery have been reviewed          Physical Exam:   All vitals stable  Wound clean and dry with minimal or no drainage.  Surrounding skin with minimal erythema.          Data:   All laboratory data related to this surgery reviewed      Kendrick Delacruz MD

## 2023-02-19 NOTE — PROGRESS NOTES
At 0730 this morn, pt up and walking in room, changed pad and had few moderate size clots, also one golf ball size on bed. Flow light. Urine output good, pike DC'd. Dr. Delacruz aware  Monitored through day, no further clots  today and has been voiding

## 2023-02-20 VITALS
HEART RATE: 95 BPM | RESPIRATION RATE: 16 BRPM | TEMPERATURE: 98 F | SYSTOLIC BLOOD PRESSURE: 131 MMHG | DIASTOLIC BLOOD PRESSURE: 82 MMHG | OXYGEN SATURATION: 100 %

## 2023-02-20 LAB — GLUCOSE BLDC GLUCOMTR-MCNC: 83 MG/DL (ref 70–99)

## 2023-02-20 PROCEDURE — 250N000013 HC RX MED GY IP 250 OP 250 PS 637: Performed by: OBSTETRICS & GYNECOLOGY

## 2023-02-20 RX ORDER — OXYTOCIN/0.9 % SODIUM CHLORIDE 30/500 ML
340 PLASTIC BAG, INJECTION (ML) INTRAVENOUS CONTINUOUS PRN
Status: DISCONTINUED | OUTPATIENT
Start: 2023-02-20 | End: 2023-02-20

## 2023-02-20 RX ORDER — IBUPROFEN 800 MG/1
800 TABLET, FILM COATED ORAL EVERY 6 HOURS
Qty: 90 TABLET | Refills: 1 | Status: SHIPPED | OUTPATIENT
Start: 2023-02-20 | End: 2024-06-21

## 2023-02-20 RX ORDER — OXYTOCIN/0.9 % SODIUM CHLORIDE 30/500 ML
100-340 PLASTIC BAG, INJECTION (ML) INTRAVENOUS CONTINUOUS PRN
Status: DISCONTINUED | OUTPATIENT
Start: 2023-02-20 | End: 2023-02-20 | Stop reason: HOSPADM

## 2023-02-20 RX ORDER — ACETAMINOPHEN 500 MG
1000 TABLET ORAL EVERY 6 HOURS PRN
Start: 2023-02-20 | End: 2024-06-21

## 2023-02-20 RX ORDER — CARBOPROST TROMETHAMINE 250 UG/ML
250 INJECTION, SOLUTION INTRAMUSCULAR
Status: DISCONTINUED | OUTPATIENT
Start: 2023-02-20 | End: 2023-02-20

## 2023-02-20 RX ORDER — CITRIC ACID/SODIUM CITRATE 334-500MG
30 SOLUTION, ORAL ORAL
Status: DISCONTINUED | OUTPATIENT
Start: 2023-02-20 | End: 2023-02-20

## 2023-02-20 RX ORDER — ACETAMINOPHEN 325 MG/1
975 TABLET ORAL ONCE
Status: DISCONTINUED | OUTPATIENT
Start: 2023-02-20 | End: 2023-02-20

## 2023-02-20 RX ORDER — CEFAZOLIN SODIUM/WATER 2 G/20 ML
2 SYRINGE (ML) INTRAVENOUS
Status: DISCONTINUED | OUTPATIENT
Start: 2023-02-20 | End: 2023-02-20

## 2023-02-20 RX ORDER — MISOPROSTOL 200 UG/1
400 TABLET ORAL
Status: DISCONTINUED | OUTPATIENT
Start: 2023-02-20 | End: 2023-02-20

## 2023-02-20 RX ORDER — LIDOCAINE 40 MG/G
CREAM TOPICAL
Status: DISCONTINUED | OUTPATIENT
Start: 2023-02-20 | End: 2023-02-20

## 2023-02-20 RX ORDER — CEFAZOLIN SODIUM/WATER 2 G/20 ML
2 SYRINGE (ML) INTRAVENOUS SEE ADMIN INSTRUCTIONS
Status: DISCONTINUED | OUTPATIENT
Start: 2023-02-20 | End: 2023-02-20

## 2023-02-20 RX ORDER — SODIUM CHLORIDE, SODIUM LACTATE, POTASSIUM CHLORIDE, CALCIUM CHLORIDE 600; 310; 30; 20 MG/100ML; MG/100ML; MG/100ML; MG/100ML
INJECTION, SOLUTION INTRAVENOUS CONTINUOUS
Status: DISCONTINUED | OUTPATIENT
Start: 2023-02-20 | End: 2023-02-20

## 2023-02-20 RX ORDER — OXYTOCIN 10 [USP'U]/ML
10 INJECTION, SOLUTION INTRAMUSCULAR; INTRAVENOUS
Status: DISCONTINUED | OUTPATIENT
Start: 2023-02-20 | End: 2023-02-20 | Stop reason: HOSPADM

## 2023-02-20 RX ORDER — METHYLERGONOVINE MALEATE 0.2 MG/ML
200 INJECTION INTRAVENOUS
Status: DISCONTINUED | OUTPATIENT
Start: 2023-02-20 | End: 2023-02-20

## 2023-02-20 RX ORDER — TRANEXAMIC ACID 10 MG/ML
1 INJECTION, SOLUTION INTRAVENOUS EVERY 30 MIN PRN
Status: DISCONTINUED | OUTPATIENT
Start: 2023-02-20 | End: 2023-02-20

## 2023-02-20 RX ORDER — OXYTOCIN 10 [USP'U]/ML
10 INJECTION, SOLUTION INTRAMUSCULAR; INTRAVENOUS
Status: DISCONTINUED | OUTPATIENT
Start: 2023-02-20 | End: 2023-02-20

## 2023-02-20 RX ADMIN — IBUPROFEN 800 MG: 800 TABLET, FILM COATED ORAL at 03:02

## 2023-02-20 RX ADMIN — IBUPROFEN 800 MG: 800 TABLET, FILM COATED ORAL at 09:16

## 2023-02-20 RX ADMIN — SENNOSIDES AND DOCUSATE SODIUM 2 TABLET: 50; 8.6 TABLET ORAL at 09:16

## 2023-02-20 RX ADMIN — ACETAMINOPHEN 975 MG: 325 TABLET, FILM COATED ORAL at 12:12

## 2023-02-20 RX ADMIN — ACETAMINOPHEN 975 MG: 325 TABLET, FILM COATED ORAL at 05:44

## 2023-02-20 ASSESSMENT — ACTIVITIES OF DAILY LIVING (ADL)
ADLS_ACUITY_SCORE: 20

## 2023-02-20 NOTE — DISCHARGE INSTRUCTIONS
[facility] Discharge Instructions     Discharge disposition:  Discharged to home       Diet:  Regular       Activity Lifting restricted to 20 pounds for the first 2 wks, then increase by 10 pounds every week thereafter.  No tub soaks for >15 minutes the first 2 wks then as desired.  No driving for 2 week(s).  No sex for 6 week(s).        Follow-up: Follow up with primary care provider in 6 weeks       Additional instructions: Wound care / dressings: may shower and keep wound clean and dry      Postop  Birth Instructions    Activity     Do not lift more than 10 pounds for 6 weeks after surgery.  Ask family and friends for help when you need it.  No driving until you have stopped taking your pain medications (usually two weeks after surgery).  No heavy exercise or activity for 6 weeks.  Don't do anything that will put a strain on your surgery site.  Don't strain when using the toilet.  Your care team may prescribe a stool softener if you have problems with your bowel movements.     To care for your incision:     Keep the incision clean and dry.  Do not soak your incision in water. No swimming or hot tubs until it has fully healed. You may soak in the bathtub if the water level is below your incision.  Do not use peroxide, gel, cream, lotion, or ointment on your incision.  Adjust your clothes to avoid pressure on your surgery site (check the elastic in your underwear for example).     You may see a small amount of clear or pink drainage and this is normal.  Check with your health care provider:     If the drainage increases or has an odor.  If the incision reddens, you have swelling, or develop a rash.  If you have increased pain and the medicine we prescribed doesn't help.  If you have a fever above 100.4 F (38 C) with or without chills when placing thermometer under your tongue.   The area around your incision (surgery wound), will feel numb.  This is normal. The numbness  should go away in less than a year.     Keep your hands clean:  Always wash your hands before touching your incision (surgery wound). This helps reduce your risk of infection. If your hands aren't dirty, you may use an alcohol hand-rub to clean your hands. Keep your nails clean and short.    Call your healthcare provider if you have any of these symptoms:     You soak a sanitary pad with blood within 1 hour, or you see blood clots larger than a golf ball.  Bleeding that lasts more than 6 weeks.  Vaginal discharge that smells bad.  Severe pain, cramping or tenderness in your lower belly area.  A need to urinate more frequently (use the toilet more often), more urgently (use the toilet very quickly), or it burns when you urinate.  Nausea and vomiting.  Redness, swelling or pain around a vein in your leg.  Problems breastfeeding or a red or painful area on your breast.  Chest pain and cough or are gasping for air.  Problems with coping with sadness, anxiety or depression. If you have concerns about hurting yourself or the baby, call your provider immediately.    You have questions or concerns after you return home.

## 2023-02-20 NOTE — PLAN OF CARE
S: Discharge  to home    B: Patient had a  delivery with no complications. Baby boy Baby's name Micha, breastfeeding: . Support person's name René.     A: VSS, ambulating independently, pain controlled with by PO medications,incision without drainage, FF, rubra scant no clots passed, voiding spontaneoulsy without difficulties, handles  with confidence.    R:  Discharge instructions reviewed and questions answered.  Belongings gathered and returned to the patient. Agreed to follow up in 6-8 weeks as scheduled or sooner with any question or concerns.     Nursing Discharge Checklist:    Pneumovax screened and given, if appropriate: YES  Influenza vaccine screened and given, if appropriate: YES  Staples removed (): N/A  Breast milk returned: N/A  Hydrogel pads sent home:YES  Birth Certificate Done: YES  Public Health Referral Made: N/A      Goal Outcome Evaluation: met, adequate for discharge

## 2023-02-20 NOTE — PLAN OF CARE
S: Shift review ([id number])  B:Brenda is a , day 2 post  birth.  A: Stable post-op, incision is CDI, Lung sounds-clear, bowel sounds active in all 4 quadrants, independent with mobility, pain control achieved with p.o. pain meds. Fundus firm, bleeding WNL, and no clots passed this shift. Handles baby with confidence. She is breastfeeding independently.  R: Continue with plan of care. Offer pain meds as scheduled. Anticipate discharge today    Mary Anne Angel RN on 2023 at 4:50 AM

## 2023-02-20 NOTE — DISCHARGE SUMMARY
[facility] Discharge Summary    Brenda Bacon MRN# [id number]   Age: 27 year old YOB: 1995     Date of Admission:  2023  Date of Discharge::  2023  Admitting Physician:  Kendrick Delacruz MD  Discharge Physician:  Ruddy Coronado MD, MD     Home clinic: [facility]          Admission Diagnoses:   Encounter for triage in pregnant patient [Z36.89]  Spontaneous breech delivery, single or unspecified fetus [O32.1XX0]          Discharge Diagnosis:   Diet controlled gestational diabetes mellitus (GDM) in third trimester [O24.410]  Encounter for prenatal care in third trimester of first pregnancy [Z34.03]  Spontaneous breech delivery, single or unspecified fetus [O32.1XX0]  Mild anemia secondary to acute blood loss          Procedures:   Procedure(s): Primary low transverse  section               Medications Prior to Admission:     Medications Prior to Admission   Medication Sig Dispense Refill Last Dose     Prenatal Vit-Fe Fumarate-FA (PRENATAL MULTIVITAMIN W/IRON) 27-0.8 MG tablet Take 1 tablet by mouth daily   2023     SENNA-docusate sodium (SENNA S) 8.6-50 MG tablet Take 1-2 tablets by mouth 2 times daily as needed 60 tablet 2 2023     acetone urine (KETOSTIX) test strip Use to test first morning urine for ketones. 50 strip 1      Alcohol Swabs PADS 1 each 4 times daily 100 each 1      blood glucose (NO BRAND SPECIFIED) lancets standard Use to test blood sugar 4 times daily or as directed. 1 each 3      blood glucose (NO BRAND SPECIFIED) test strip Use to test blood sugar 4 times daily or as directed. 100 strip 3      blood glucose monitoring (NO BRAND SPECIFIED) meter device kit Use to test blood sugar 4 times daily or as directed. 1 kit 0      [DISCONTINUED] aspirin (ASA) 81 MG EC tablet Take 1 tablet (81 mg) by mouth daily 60 tablet 2 Past Week             Discharge Medications:     Current Discharge Medication List       START taking these medications    Details   acetaminophen (TYLENOL) 500 MG tablet Take 2 tablets (1,000 mg) by mouth every 6 hours as needed for mild pain    Associated Diagnoses: S/P  section      ibuprofen (ADVIL/MOTRIN) 800 MG tablet Take 1 tablet (800 mg) by mouth every 6 hours  Qty: 90 tablet, Refills: 1    Associated Diagnoses: S/P  section         CONTINUE these medications which have NOT CHANGED    Details   Prenatal Vit-Fe Fumarate-FA (PRENATAL MULTIVITAMIN W/IRON) 27-0.8 MG tablet Take 1 tablet by mouth daily      SENNA-docusate sodium (SENNA S) 8.6-50 MG tablet Take 1-2 tablets by mouth 2 times daily as needed  Qty: 60 tablet, Refills: 2    Associated Diagnoses: Prenatal care, first trimester      acetone urine (KETOSTIX) test strip Use to test first morning urine for ketones.  Qty: 50 strip, Refills: 1    Associated Diagnoses: GDM, class A1      Alcohol Swabs PADS 1 each 4 times daily  Qty: 100 each, Refills: 1    Associated Diagnoses: GDM, class A1      blood glucose (NO BRAND SPECIFIED) lancets standard Use to test blood sugar 4 times daily or as directed.  Qty: 1 each, Refills: 3    Associated Diagnoses: GDM, class A1      blood glucose (NO BRAND SPECIFIED) test strip Use to test blood sugar 4 times daily or as directed.  Qty: 100 strip, Refills: 3    Associated Diagnoses: GDM, class A1      blood glucose monitoring (NO BRAND SPECIFIED) meter device kit Use to test blood sugar 4 times daily or as directed.  Qty: 1 kit, Refills: 0    Associated Diagnoses: GDM, class A1         STOP taking these medications       aspirin (ASA) 81 MG EC tablet Comments:   Reason for Stopping:                     Consultations:   Lactation consultation          Brief History of Labor or Admission:   Patient presented with spontaneous labor and was breech in presentation.  The OR crew was called and she had a primary low transverse  section as her mode of delivery without complication.               Hospital Course:   The patient's hospital course was unremarkable.  She recovered as anticipated and experienced no post-operative complications.  On discharge, her pain was well controlled. Vaginal bleeding is similar to peak menstrual flow.  Voiding without difficulty.  Ambulating well and tolerating a normal diet.  No fever or significant wound drainage.  Breastfeeding well.  Infant is stable.  She had a bowel movement x 1  She was discharged on post-partum day #2.    Post-partum hemoglobin:   Hemoglobin   Date Value Ref Range Status   02/19/2023 10.6 (L) 11.7 - 15.7 g/dL Final   08/02/2016 15.0 11.7 - 15.7 g/dL Final             Discharge Instructions and Follow-Up:   Discharge diet: Regular   Discharge activity: Lifting restricted to 20 pounds for the first 2 wks, then increase by 10 pounds every week thereafter.  No tub soaks for >15 minutes the first 2 wks then as desired.  No driving for 2 week(s).  No sex for 6 week(s).    Discharge follow-up: Follow up with primary care provider in 6 weeks   Wound care: Drink plenty of fluids  Ice to area for comfort  Keep wound clean and dry           Discharge Disposition:   Discharged to home      Attestation:  I have reviewed today's vital signs, notes, medications, labs and imaging.  Amount of time performed on this discharge summary: 20 minutes.    Electronically signed by:  Ruddy Coronado M.D.  2/20/2023

## 2023-02-21 NOTE — TELEPHONE ENCOUNTER
FYI -  cancelled.  Pt delivered 23.  CX with SDS and [place].    FYI to Aida Morgan DO.    -Irene Monteiro  Clinic Station

## 2023-02-24 ENCOUNTER — TELEPHONE (OUTPATIENT)
Dept: OBGYN | Facility: CLINIC | Age: 28
End: 2023-02-24
Payer: COMMERCIAL

## 2023-02-24 NOTE — TELEPHONE ENCOUNTER
Brenda Bacon  Gender: female  : 1995  [id number] [address] [address]  [phone] (home)   Medical Record: [id number]  Primary Care Provider: Dayron Pineda       [facility]   ?   Discharge Phone Call: Key Words/Key Times     How are you and the baby? Going good.    How are feedings going? Brst q 2-3 hr, latching well.    Voiding & Stooling? Yes.    Any questions or concerns? No.    Follow-up appointment? Baby was seen in clinic today. Mom has appointment in early April.      We want to provide excellent care here at [facility]. Do you have any feedback for us that would help us improve? No. Everything was great.    Call back COMMENTS:         Attempted Calls:   _________     __________

## 2023-03-17 ENCOUNTER — MEDICAL CORRESPONDENCE (OUTPATIENT)
Dept: HEALTH INFORMATION MANAGEMENT | Facility: CLINIC | Age: 28
End: 2023-03-17
Payer: COMMERCIAL

## 2023-04-04 ENCOUNTER — PRENATAL OFFICE VISIT (OUTPATIENT)
Dept: OBGYN | Facility: CLINIC | Age: 28
End: 2023-04-04
Payer: COMMERCIAL

## 2023-04-04 VITALS
HEART RATE: 105 BPM | RESPIRATION RATE: 14 BRPM | BODY MASS INDEX: 22.03 KG/M2 | DIASTOLIC BLOOD PRESSURE: 80 MMHG | TEMPERATURE: 96.7 F | SYSTOLIC BLOOD PRESSURE: 123 MMHG | WEIGHT: 140.4 LBS | HEIGHT: 67 IN

## 2023-04-04 DIAGNOSIS — Z86.32 HISTORY OF GESTATIONAL DIABETES: Primary | ICD-10-CM

## 2023-04-04 PROBLEM — Z34.03 ENCOUNTER FOR PRENATAL CARE IN THIRD TRIMESTER OF FIRST PREGNANCY: Status: RESOLVED | Noted: 2023-02-18 | Resolved: 2023-04-04

## 2023-04-04 PROBLEM — Z34.00 PRENATAL CARE, FIRST PREGNANCY: Status: RESOLVED | Noted: 2022-06-26 | Resolved: 2023-04-04

## 2023-04-04 PROCEDURE — 99207 PR POST PARTUM EXAM: CPT | Performed by: OBSTETRICS & GYNECOLOGY

## 2023-04-04 ASSESSMENT — PATIENT HEALTH QUESTIONNAIRE - PHQ9
5. POOR APPETITE OR OVEREATING: NOT AT ALL
SUM OF ALL RESPONSES TO PHQ QUESTIONS 1-9: 2

## 2023-04-04 ASSESSMENT — ANXIETY QUESTIONNAIRES
IF YOU CHECKED OFF ANY PROBLEMS ON THIS QUESTIONNAIRE, HOW DIFFICULT HAVE THESE PROBLEMS MADE IT FOR YOU TO DO YOUR WORK, TAKE CARE OF THINGS AT HOME, OR GET ALONG WITH OTHER PEOPLE: NOT DIFFICULT AT ALL
1. FEELING NERVOUS, ANXIOUS, OR ON EDGE: SEVERAL DAYS
GAD7 TOTAL SCORE: 2
2. NOT BEING ABLE TO STOP OR CONTROL WORRYING: NOT AT ALL
7. FEELING AFRAID AS IF SOMETHING AWFUL MIGHT HAPPEN: NOT AT ALL
5. BEING SO RESTLESS THAT IT IS HARD TO SIT STILL: NOT AT ALL
GAD7 TOTAL SCORE: 2
6. BECOMING EASILY ANNOYED OR IRRITABLE: SEVERAL DAYS
3. WORRYING TOO MUCH ABOUT DIFFERENT THINGS: NOT AT ALL

## 2023-04-04 NOTE — PROGRESS NOTES
"[facility]    Post Partum Office Visit    CC: Post partum visit    HPI: Brenda Bacon is a 27 year old  who presents for a 6 week post-partum visit.  Patient delivered on 23, by Dr. Delacruz at [facility] at 38w3d gestation.  Patient delivered via primary low transverse C section due to breech presentation, presented in labor. Complications During pregnancy: breech presentation, GDMA1    Has been doing well since delivery. Lochia resolved. Breast feeding and pumping. Pain resolved. No mood concerns, denies anxiety or depression.      Information  Sex: male  Complications: none   Feeding Method: breast milk    Maternal Information  Postpartum Depression: denies  Resumed [place]: denies  Last Pap Smear: 2020 NIL  Birth Control Method: uncertain, considering IUD    ROS:  General/Constitutional:  Denies chills or fever  Respiratory: Denies shortness of breath, cough, wheezing   Cardiovascular: Denies chest pain, exertional pain, irregular heartbeat  Gastrointestinal:  Denies abdominal pain, constipation, nausea, or vomiting  Genitourinary: Denies hematuria, difficulty urinating, frequency, or dysuria   Skin: Denies dry skin, itching, rash, new skin lesions  Musculoskeletal: Denies aching muscles or joints, swelling in joints, back pain, shoulder pain, or painful feet, no peripheral edema  Psychiatric: Denies post partum depression    Physical Exam:   Vitals:    23 1119   BP: 123/80   BP Location: Right arm   Patient Position: Sitting   Cuff Size: Adult Regular   Pulse: 105   Resp: 14   Temp: (!) 96.7  F (35.9  C)   TempSrc: Tympanic   Weight: 63.7 kg (140 lb 6.4 oz)   Height: 1.695 m (5' 6.75\")      Estimated body mass index is 22.15 kg/m  as calculated from the following:    Height as of this encounter: 1.695 m (5' 6.75\").    Weight as of this encounter: 63.7 kg (140 lb 6.4 oz).    General appearance: well-hydrated, A&O x 3, no apparent " distress  Lungs: Equal expansion bilaterally, no accessory muscle use  Heart: No heaves or thrills.   Abdomen: Soft, non-tender, non-distended. No rebound, rigidity, or guarding.  Extremities: no edema  Neuro: CN II-XII grossly intact      Assessment and Plan:     Encounter Diagnoses   Name Primary?     History of gestational diabetes Yes     Routine postpartum follow-up        Appropriate post partum recovery.    Uncertain on contraception, considering IUD. Will reach out to schedule appointment for placement. Recommend condom use in meantime. Discussed recommended pregnancy interval after C section, waiting 12 months until conception.      History of GDMA1, 2 hour GTT ordered.    Plan for routine GYN cares, PAP smear due November 2023. Plan to get if she returns for IUD placement or will return for annual exam later this year. Declined today.     Aida Morgan, DO

## 2023-04-04 NOTE — NURSING NOTE
"Initial /80 (BP Location: Right arm, Patient Position: Sitting, Cuff Size: Adult Regular)   Pulse 105   Temp (!) 96.7  F (35.9  C) (Tympanic)   Resp 14   Ht 1.695 m (5' 6.75\")   Wt 63.7 kg (140 lb 6.4 oz)   LMP 05/19/2022 (Approximate)   Breastfeeding Yes   BMI 22.15 kg/m   Estimated body mass index is 22.15 kg/m  as calculated from the following:    Height as of this encounter: 1.695 m (5' 6.75\").    Weight as of this encounter: 63.7 kg (140 lb 6.4 oz). .      "

## 2023-04-14 ENCOUNTER — MEDICAL CORRESPONDENCE (OUTPATIENT)
Dept: HEALTH INFORMATION MANAGEMENT | Facility: CLINIC | Age: 28
End: 2023-04-14
Payer: COMMERCIAL

## 2023-06-24 ENCOUNTER — MEDICAL CORRESPONDENCE (OUTPATIENT)
Dept: HEALTH INFORMATION MANAGEMENT | Facility: CLINIC | Age: 28
End: 2023-06-24
Payer: COMMERCIAL

## 2023-12-04 ENCOUNTER — E-VISIT (OUTPATIENT)
Dept: URGENT CARE | Facility: CLINIC | Age: 28
End: 2023-12-04
Payer: COMMERCIAL

## 2023-12-04 ENCOUNTER — LAB (OUTPATIENT)
Dept: FAMILY MEDICINE | Facility: CLINIC | Age: 28
End: 2023-12-04
Attending: NURSE PRACTITIONER
Payer: COMMERCIAL

## 2023-12-04 DIAGNOSIS — J02.9 SORE THROAT: ICD-10-CM

## 2023-12-04 DIAGNOSIS — J02.9 SORE THROAT: Primary | ICD-10-CM

## 2023-12-04 LAB
DEPRECATED S PYO AG THROAT QL EIA: NEGATIVE
GROUP A STREP BY PCR: NOT DETECTED

## 2023-12-04 PROCEDURE — 87651 STREP A DNA AMP PROBE: CPT

## 2023-12-04 PROCEDURE — 99421 OL DIG E/M SVC 5-10 MIN: CPT | Performed by: NURSE PRACTITIONER

## 2023-12-04 NOTE — PATIENT INSTRUCTIONS
Dear Brenda,    After reviewing your responses, I would like you to come in for a swab to make sure we treat you correctly. This swab is to evaluate you for possible Strep Throat, and should be scheduled for today or tomorrow. Please use the Schedule Now button in [facility] to schedule your swab. Otherwise, click this link to schedule a lab only appointment.    Lab appointments are not available at most locations on the weekends. If no Lab Only appointment is available, you should be seen in any of our convenient Urgent Care Centers for an in person visit, which can be found on our website here.    You will receive instructions with your results in [facility] once they are available.     If your symptoms worsen, you develop difficulty breathing, difficulty with drinking enough to stay hydrated, difficulty swallowing your saliva or have fevers for more than 5 days, please contact your primary care provider for an appointment or visit an Urgent Care Center to be seen.      Thanks again for choosing us as your health care partner.   SHAHAB Delaney CNP  Sore Throat: Care Instructions  Overview     Infection by bacteria or a virus causes most sore throats. Cigarette smoke, dry air, air pollution, allergies, and yelling can also cause a sore throat. Sore throats can be painful and annoying. Fortunately, most sore throats go away on their own. If you have a bacterial infection, your doctor may prescribe antibiotics.  Follow-up care is a key part of your treatment and safety. Be sure to make and go to all appointments, and call your doctor if you are having problems. It's also a good idea to know your test results and keep a list of the medicines you take.  How can you care for yourself at home?  If your doctor prescribed antibiotics, take them as directed. Do not stop taking them just because you feel better. You need to take the full course of antibiotics.  Gargle with warm salt water several times a day to help  "reduce swelling and relieve pain. Mix 1/2 teaspoon of salt in 1 cup of warm water.  Take an over-the-counter pain medicine, such as acetaminophen (Tylenol), ibuprofen (Advil, Motrin), or naproxen (Aleve). Read and follow all instructions on the label.  Be careful when taking over-the-counter cold or flu medicines and Tylenol at the same time. Many of these medicines have acetaminophen, which is Tylenol. Read the labels to make sure that you are not taking more than the recommended dose. Too much acetaminophen (Tylenol) can be harmful.  Drink plenty of fluids. Fluids may help soothe an irritated throat. Hot fluids, such as tea or soup, may help decrease throat pain.  Use over-the-counter throat lozenges to soothe pain. Regular cough drops or hard candy may also help. These should not be given to young children because of the risk of choking.  Do not smoke or allow others to smoke around you. If you need help quitting, talk to your doctor about stop-smoking programs and medicines. These can increase your chances of quitting for good.  Use a vaporizer or humidifier to add moisture to your bedroom. Follow the directions for cleaning the machine.  When should you call for help?   Call your doctor now or seek immediate medical care if:    You have trouble breathing.     Your sore throat gets much worse on one side.     You have new or worse trouble swallowing.     You have a new or higher fever.   Watch closely for changes in your health, and be sure to contact your doctor if you do not get better as expected.  Where can you learn more?  Go to https://www.[employer].net/patiented  Enter U420 in the search box to learn more about \"Sore Throat: Care Instructions.\"  Current as of: February 28, 2023               Content Version: 13.8    8936-0819 [employer].   Care instructions adapted under license by your healthcare professional. If you have questions about a medical condition or this instruction, always ask " your healthcare professional. [employer] disclaims any warranty or liability for your use of this information.

## 2024-03-16 ENCOUNTER — HEALTH MAINTENANCE LETTER (OUTPATIENT)
Age: 29
End: 2024-03-16

## 2024-05-22 ENCOUNTER — VIRTUAL VISIT (OUTPATIENT)
Dept: FAMILY MEDICINE | Facility: CLINIC | Age: 29
End: 2024-05-22
Payer: COMMERCIAL

## 2024-05-22 DIAGNOSIS — Z34.80 ENCOUNTER FOR SUPERVISION OF NORMAL PREGNANCY IN MULTIGRAVIDA: Primary | ICD-10-CM

## 2024-05-22 PROCEDURE — 99207 PR NO CHARGE NURSE ONLY: CPT | Mod: 93

## 2024-05-22 NOTE — PATIENT INSTRUCTIONS
Learning About Pregnancy  Your Care Instructions     Your health in the early weeks of your pregnancy is particularly important for your baby's health. Take good care of yourself. Anything you do that harms your body can also harm your baby.  Make sure to go to all of your doctor appointments. Regular checkups will help keep you and your baby healthy.  How can you care for yourself at home?  Diet    Choose healthy foods like fruits, vegetables, whole grains, lean proteins, and healthy fats.     Choose foods that are good sources of calcium, iron, and folate. You can try dairy products, dark leafy greens, fortified orange juice and cereals, almonds, broccoli, dried fruit, and beans.     Do not skip meals or go for many hours without eating. If you are nauseated, try to eat a small, healthy snack every 2 to 3 hours.     Avoid fish that are high in mercury. These include shark, swordfish, anatoliy mackerel, marlin, orange roughy, and bigeye tuna, as well as tilefish from the [facility].     It's okay to eat up to 8 to 12 ounces a week of fish that are low in mercury or up to 4 ounces a week of fish that have medium levels of mercury. Some fish that are low in mercury are salmon, shrimp, canned light tuna, cod, and tilapia. Some fish that have medium levels of mercury are halibut and white albacore tuna.     Drink plenty of fluids. If you have kidney, heart, or liver disease and have to limit fluids, talk with your doctor before you increase the amount of fluids you drink.     Limit caffeine to about 200 to 300 mg per day. On average, a cup of brewed coffee has around 80 to 100 mg of caffeine.     Do not drink alcohol, such as beer, wine, or hard liquor.     Take a multivitamin that contains at least 400 micrograms (mcg) of folic acid to help prevent birth defects. Fortified cereal and whole wheat bread are good additional sources of folic acid.     Increase the calcium in your diet. Try to drink a quart of skim milk  each day. You may also take calcium supplements and choose foods such as cheese and yogurt.   Lifestyle    Make sure you go to your follow-up appointments.     Get plenty of rest. You may be unusually tired while you are pregnant.     Get at least 30 minutes of exercise on most days of the week. Walking is a good choice. If you have not exercised in the past, start out slowly. Take several short walks each day.     Do not smoke. If you need help quitting, talk to your doctor about stop-smoking programs. These can increase your chances of quitting for good.     Do not touch cat feces or litter boxes. Also, wash your hands after you handle raw meat, and fully cook all meat before you eat it. Wear gloves when you work in the yard or garden, and wash your hands well when you are done. Cat feces, raw or undercooked meat, and contaminated dirt can cause an infection that may harm your baby or lead to a miscarriage.     Avoid things that can make your body too hot and may be harmful to your baby, such as a hot tub or sauna. Or talk with your doctor before doing anything that raises your body temperature. Your doctor can tell you if it's safe.     Avoid chemical fumes, paint fumes, or poisons.     Do not use illegal drugs, marijuana, or alcohol.   Medicines    Review all of your medicines with your doctor. Some of your routine medicines may need to be changed to protect your baby.     Use acetaminophen (Tylenol) to relieve minor problems, such as a mild headache or backache or a mild fever with cold symptoms. Do not use nonsteroidal anti-inflammatory drugs (NSAIDs), such as ibuprofen (Advil, Motrin) or naproxen (Aleve), unless your doctor says it is okay.     Do not take two or more pain medicines at the same time unless the doctor told you to. Many pain medicines have acetaminophen, which is Tylenol. Too much acetaminophen (Tylenol) can be harmful.     Take your medicines exactly as prescribed. Call your doctor if you  "think you are having a problem with your medicine.   To manage morning sickness    Keep food in your stomach, but not too much at once. Try eating five or six small meals a day instead of three large meals.     For nausea when you wake up, eat a small snack, such as a couple of crackers or pretzels, before rising. Allow a few minutes for your stomach to settle before you slowly get up.     Try to avoid smells and foods that make you feel nauseated. High-fat or greasy foods, milk, and coffee may make nausea worse. Some foods that may be easier to tolerate include cold, spicy, sour, and salty foods.     Drink enough fluids. Water and other caffeine-free drinks are good choices.     Take your prenatal vitamins at night on a full stomach.     Try foods and drinks made with michelle. Michelle may help with nausea.     Get lots of rest. Morning sickness may be worse when you are tired.     Talk to your doctor about over-the-counter products, such as vitamin B6 or doxylamine, to help relieve symptoms.     Try a P6 acupressure wrist band. These anti-nausea wristbands help some people.   Follow-up care is a key part of your treatment and safety. Be sure to make and go to all appointments, and call your doctor if you are having problems. It's also a good idea to know your test results and keep a list of the medicines you take.  Where can you learn more?  Go to https://www.Snapchat.net/patiented  Enter E868 in the search box to learn more about \"Learning About Pregnancy.\"  Current as of: July 10, 2023               Content Version: 14.0    6304-2441 [employer].   Care instructions adapted under license by your healthcare professional. If you have questions about a medical condition or this instruction, always ask your healthcare professional. [employer] disclaims any warranty or liability for your use of this information.      Weeks 6 to 10 of Your Pregnancy: Care Instructions  During these weeks of " "pregnancy, your body goes through many changes. You may start to feel different, both in your body and your emotions. Each pregnancy is different, so there's no \"right\" way to feel. These early weeks are a time to make healthy choices for you and your pregnancy.    Take a daily prenatal vitamin. Choose one with folic acid in it.    Avoid alcohol, tobacco, and drugs (including marijuana). If you need help quitting, talk to your doctor.    Drink plenty of liquids.  Be sure to drink enough water. And limit sodas, other sweetened drinks, and caffeine.     Choose foods that are good sources of calcium, iron, and folate.  You can try dairy products, dark leafy greens, fortified orange juice and cereals, almonds, broccoli, dried fruit, and beans.     Avoid foods that may be harmful.  Don't eat raw meat, deli meat, raw seafood, or raw eggs. Avoid soft cheese and unpasteurized dairy, like Brie and blue cheese. And don't eat fish that contains a lot of mercury, like shark and swordfish.     Don't touch erik litter or cat poop.  They can cause an infection that could be harmful during pregnancy.     Avoid things that can make your body too hot.  For example, avoid hot tubs and saunas.     Soothe morning sickness.  Try eating 5 or 6 small meals a day, getting some fresh air, or using angel to control symptoms.     Ask your doctor about flu and COVID-19 shots.  Getting them can help protect against infection.   Follow-up care is a key part of your treatment and safety. Be sure to make and go to all appointments, and call your doctor if you are having problems. It's also a good idea to know your test results and keep a list of the medicines you take.  Where can you learn more?  Go to https://[email or web address]/patiented  Enter G112 in the search box to learn more about \"Weeks 6 to 10 of Your Pregnancy: Care Instructions.\"  Current as of: July 10, 2023               Content Version: 14.0    2868-6582 [employer]. "   Care instructions adapted under license by your healthcare professional. If you have questions about a medical condition or this instruction, always ask your healthcare professional. [employer] disclaims any warranty or liability for your use of this information.         Managing Morning Sickness (01:55)  Your health professional recommends that you watch this short online health video.  Learn tips for dealing with morning sickness, no matter what time of day you have it.  Purpose:  Gives tips for managing morning sickness, including eating small low-fat meals and avoiding caffeine and spicy food.  Goal:  The user will learn tips for dealing with morning sickness during pregnancy.     How to watch the video    Scan the QR code   OR Visit the website    [email or web address]   Current as of: July 10, 2023               Content Version: 14.0    0024-2144 [employer].   Care instructions adapted under license by your healthcare professional. If you have questions about a medical condition or this instruction, always ask your healthcare professional. [employer] disclaims any warranty or liability for your use of this information.      Pregnancy and Heartburn: Care Instructions  Overview     Heartburn is a common problem during pregnancy.  Heartburn happens when stomach acid backs up into the tube that carries food to the stomach. This tube is called the esophagus. Early in pregnancy, heartburn is caused by hormone changes that slow down digestion. Later on, it's also caused by the large uterus pushing up on the stomach.  Even though you can't fix the cause, there are things you can do to get relief. Treating heartburn during pregnancy focuses first on making lifestyle changes, like changing what and how you eat, and on taking medicines.  Heartburn usually improves or goes away after childbirth.  Follow-up care is a key part of your treatment and safety. Be sure to make  "and go to all appointments, and call your doctor if you are having problems. It's also a good idea to know your test results and keep a list of the medicines you take.  How can you care for yourself at home?  Eat small, frequent meals.  Avoid foods that make your symptoms worse, such as chocolate, peppermint, and spicy foods. Avoid drinks with caffeine, such as coffee, tea, and sodas.  Avoid bending over or lying down after meals.  Take a short walk after you eat.  If heartburn is a problem at night, do not eat for 2 hours before bedtime.  Take antacids like Mylanta, Maalox, Rolaids, or Tums. Do not take antacids that have sodium bicarbonate, magnesium trisilicate, or aspirin. Be careful when you take over-the-counter antacid medicines. Many of these medicines have aspirin in them. While you are pregnant, do not take aspirin or medicines that contain aspirin unless your doctor says it is okay.  If you're not getting relief, talk to your doctor. You may be able to take a stronger acid-reducing medicine.  When should you call for help?   Call your doctor now or seek immediate medical care if:    You have new or worse belly pain.     You are vomiting.   Watch closely for changes in your health, and be sure to contact your doctor if:    You have new or worse symptoms of reflux.     You are losing weight.     You have trouble or pain swallowing.     You do not get better as expected.   Where can you learn more?  Go to https://[email or web address].net/patiented  Enter U946 in the search box to learn more about \"Pregnancy and Heartburn: Care Instructions.\"  Current as of: July 10, 2023               Content Version: 14.0    3567-5246 [employer].   Care instructions adapted under license by your healthcare professional. If you have questions about a medical condition or this instruction, always ask your healthcare professional. [employer] disclaims any warranty or liability for your use of this " information.      Constipation: Care Instructions  Overview     Constipation means that you have a hard time passing stools (bowel movements). People pass stools from 3 times a day to once every 3 days. What is normal for you may be different. Constipation may occur with pain in the rectum and cramping. The pain may get worse when you try to pass stools. Sometimes there are small amounts of bright red blood on toilet paper or the surface of stools. This is because of enlarged veins near the rectum (hemorrhoids).  A few changes in your diet and lifestyle may help you avoid ongoing constipation. Your doctor may also prescribe medicine to help loosen your stool.  Some medicines can cause constipation. These include pain medicines and antidepressants. Tell your doctor about all the medicines you take. Your doctor may want to make a medicine change to ease your symptoms.  Follow-up care is a key part of your treatment and safety. Be sure to make and go to all appointments, and call your doctor if you are having problems. It's also a good idea to know your test results and keep a list of the medicines you take.  How can you care for yourself at home?  Drink plenty of fluids. If you have kidney, heart, or liver disease and have to limit fluids, talk with your doctor before you increase the amount of fluids you drink.  Include high-fiber foods in your diet each day. These include fruits, vegetables, beans, and whole grains.  Get at least 30 minutes of exercise on most days of the week. Walking is a good choice. You also may want to do other activities, such as running, swimming, cycling, or playing tennis or team sports.  Take a fiber supplement, such as Citrucel or Metamucil, every day. Read and follow all instructions on the label.  Schedule time each day for a bowel movement. A daily routine may help. Take your time having a bowel movement, but don't sit for more than 10 minutes at a time. And don't strain too  "much.  Support your feet with a small step stool when you sit on the toilet. This helps flex your hips and places your pelvis in a squatting position.  Your doctor may recommend an over-the-counter laxative to relieve your constipation. Examples are Milk of Magnesia and MiraLax. Read and follow all instructions on the label. Do not use laxatives on a long-term basis.  When should you call for help?   Call your doctor now or seek immediate medical care if:    You have new or worse belly pain.     You have new or worse nausea or vomiting.     You have blood in your stools.   Watch closely for changes in your health, and be sure to contact your doctor if:    Your constipation is getting worse.     You do not get better as expected.   Where can you learn more?  Go to [email or web address]  Enter P343 in the search box to learn more about \"Constipation: Care Instructions.\"  Current as of: October 19, 2023               Content Version: 14.0    9715-9934 [employer].   Care instructions adapted under license by your healthcare professional. If you have questions about a medical condition or this instruction, always ask your healthcare professional. [employer] disclaims any warranty or liability for your use of this information.      Learning About High-Iron Foods  What foods are high in iron?     The foods you eat contain nutrients, such as vitamins and minerals. Iron is a nutrient. Your body needs the right amount to stay healthy and work as it should. You can use the list below to help you make choices about which foods to eat.  Here are some foods that contain iron. They have 1 to 2 milligrams of iron per serving.  Fruits  Figs (dried), 5 figs  Vegetables  Asparagus (canned), 6 ferguson  Eneida, beet, Swiss chard, or turnip greens, 1 cup  Dried peas, cooked,   cup  Seaweed, spirulina (dried),   cup  Spinach, (cooked)   cup or (raw) 1 cup  Grains  Cereals, fortified with iron, 1 " "cup  Grits (instant, cooked), fortified with iron,   cup  Meats and other protein foods  Beans (kidney, lima, navy, white), canned or cooked,   cup  Beef or lamb, 3 oz  Chicken giblets, 3 oz  Chickpeas (garbanzo beans),   cup  Liver of beef, lamb, or pork, 3 oz  Oysters (cooked), 3 oz  Sardines (canned), 3 oz  Soybeans (boiled),   cup  Tofu (firm),   cup  Work with your doctor to find out how much of this nutrient you need. Depending on your health, you may need more or less of it in your diet.  Where can you learn more?  Go to https://www.OpenSesame.net/patiented  Enter R005 in the search box to learn more about \"Learning About High-Iron Foods.\"  Current as of: September 20, 2023               Content Version: 14.0    5470-9094 [employer].   Care instructions adapted under license by your healthcare professional. If you have questions about a medical condition or this instruction, always ask your healthcare professional. [employer] disclaims any warranty or liability for your use of this information.      Rh Antibodies Screening During Pregnancy: About This Test  What is it?     The Rh antibodies screening test is a blood test. It checks your blood for Rh antibodies. If you have Rh-negative blood and have been exposed to Rh-positive blood, your immune system may make antibodies to attack the Rh-positive blood. When a pregnant woman has these antibodies, it is called Rh sensitization.  Why is this test done?  The Rh antibodies screening test is done during pregnancy to find out if your baby is at risk for Rh disease. This can happen if you have Rh-negative blood and your baby has Rh-positive blood. If your Rh-negative blood mixes with Rh-positive blood, your immune system will make antibodies to attack the Rh-positive blood.  During pregnancy, these antibodies could attach to the baby's red blood cells. This can cause your baby to have serious health problems. The results of this test " "will help your doctor know how to best care for you and your baby during your pregnancy.  How do you prepare for the test?  In general, there's nothing you have to do before this test, unless your doctor tells you to.  How is the test done?  A health professional uses a needle to take a blood sample, usually from the arm.  What happens after the test?  You will probably be able to go home right away. It depends on the reason for the test.  You can go back to your usual activities right away.  Follow-up care is a key part of your treatment and safety. Be sure to make and go to all appointments, and call your doctor if you are having problems. It's also a good idea to keep a list of the medicines you take. Ask your doctor when you can expect to have your test results.  Where can you learn more?  Go to https://www.Mindscape.Codeoscopic/patiented  Enter P722 in the search box to learn more about \"Rh Antibodies Screening During Pregnancy: About This Test.\"  Current as of: July 10, 2023               Content Version: 14.0    0864-4853 [employer].   Care instructions adapted under license by your healthcare professional. If you have questions about a medical condition or this instruction, always ask your healthcare professional. [employer] disclaims any warranty or liability for your use of this information.      Learning About Preventing Rh Disease  What is Rh disease?     Rh disease can be a serious problem in pregnancy. It happens when substances called antibodies in the mother's blood cause red blood cells in her baby's blood to be destroyed. This can occur when the blood types of a mother and her baby do not match.  All blood has an Rh factor. This is what makes a blood type positive or negative. When you are Rh-negative, your baby may be Rh-negative or Rh-positive. If your baby has Rh-positive blood and it mixes with yours, your body will make antibodies. This is called Rh sensitization.  Most of " "the time, this is not a problem in a first pregnancy. But in future pregnancies, it could cause Rh disease.  A  with Rh disease has mild anemia and may have jaundice. In severe cases, anemia, jaundice, and swelling can be very dangerous or fatal. Some babies need to be delivered early. Some need special care in the NICU. A very sick baby will need a blood transfusion before or after birth.  Fortunately, Rh sensitization is usually easy to prevent.  That's why it's important to get your Rh status checked in your first trimester. It doesn't cause any warning signs. A blood test is the only way to know if you are Rh-sensitive or are at risk for it.  How can you prevent Rh disease?  If you are Rh-negative, your doctor gives you an Rh immune globulin shot (such as RhoGAM). It helps prevent your body from making the antibodies that attack your baby's red blood cells.  Timing is important. You need the shot at certain times during your pregnancy. And you need one anytime there is a chance that your baby's blood might mix with yours. That can happen with certain prenatal tests or when you have pregnancy bleeding, such as:  Right after any pregnancy loss, amniocentesis, or CVS testing.  After turning of a breech baby.  Before and maybe after childbirth. Your doctor gives you a shot around week 28. If your  is Rh-positive, you will have another shot.  Follow-up care is a key part of your treatment and safety. Be sure to make and go to all appointments, and call your doctor if you are having problems. It's also a good idea to know your test results and keep a list of the medicines you take.  Where can you learn more?  Go to https://www.[employer].net/patiented  Enter W177 in the search box to learn more about \"Learning About Preventing Rh Disease.\"  Current as of: July 10, 2023               Content Version: 14.0    6831-4991 Healthwise, Incorporated.   Care instructions adapted under license by [facility] healthcare " professional. If you have questions about a medical condition or this instruction, always ask your healthcare professional. Healthwise, [facility] disclaims any warranty or liability for your use of this information.      Learning About Rh Immunoglobulin Shots  Introduction     An Rh immunoglobulin shot is given to pregnant women who have Rh-negative blood.  You may have Rh-negative blood, and your baby may have Rh-positive blood. If the two types of blood mix, your body will make antibodies. This is called Rh sensitization. Most of the time, this is not a problem the first time you're pregnant. But it could cause problems in future pregnancies.  This shot keeps your body from making the antibodies. You get the shot around 28 weeks of pregnancy. After the birth, your baby's blood is tested. If the blood is Rh positive, you will get another shot. You may also get the shot if you have vaginal bleeding while you are pregnant or if you have a miscarriage. These shots protect future pregnancies.  Women with Rh negative blood will need this shot each time they get pregnant.  Example  Rh immunoglobulin (HypRho-D, MICRhoGAM, and RhoGAM)  Possible side effects  Rare side effects may include:  Some mild pain where you got the shot.  A slight fever.  An allergic reaction.  You may have other side effects not listed here. Check the information that comes with your medicine.  What to know about taking this medicine  You may need more than one shot. You may need the shot again:  After amniocentesis, fetal blood sampling, or chorionic villus sampling tests.  If you have bleeding in your second or third trimester.  After turning of a breech baby.  After an injury to the belly while you are pregnant.  After a miscarriage or an .  Before or right after treatment for an ectopic or a partial molar pregnancy.  Tell your doctor if you have any allergies or have had a bad response to medicines in the past.  If you get this shot  "within 3 months of getting a live-virus vaccine, the vaccine may not work. Your doctor will tell you if you need more vaccine.  Check with your doctor or pharmacist before you use any other medicines. This includes over-the-counter medicines. Make sure your doctor knows all of the medicines, vitamins, herbs, and supplements you take. Taking some medicines at the same time can cause problems.  Where can you learn more?  Go to https://www.6APT.Numascale/patiented  Enter V615 in the search box to learn more about \"Learning About Rh Immunoglobulin Shots.\"  Current as of: July 10, 2023               Content Version: 14.0    [id number] [employer].   Care instructions adapted under license by your healthcare professional. If you have questions about a medical condition or this instruction, always ask your healthcare professional. [employer] disclaims any warranty or liability for your use of this information.      Rubella ([place] Measles): Care Instructions  Overview  Rubella, also called [place] measles or 3-day measles, is a disease caused by a virus. It spreads by coughs, sneezes, and close contact. Rubella usually is mild and does not cause long-term problems. But if you are pregnant and get it, you can give the disease to your unborn baby. This can cause serious birth defects.  While you have rubella, you may get a rash and a mild fever, and the lymph glands in your neck may swell. Older children often have a fever, eye pain, a sore throat, and body aches. You can relieve most symptoms with care at home. Avoid being around others, especially pregnant people, until your rash has been gone for at least 4 days. People who have not had this disease before or have not had the vaccine have the greatest chance of getting the virus.  Follow-up care is a key part of your treatment and safety. Be sure to make and go to all appointments, and call your doctor if you are having problems. It's also a good " "idea to know your test results and keep a list of the medicines you take.  How can you care for yourself at home?  Drink plenty of fluids. If you have kidney, heart, or liver disease and have to limit fluids, talk with your doctor before you increase the amount of fluids you drink.  Get plenty of rest to help your body heal.  Take an over-the-counter pain medicine, such as acetaminophen (Tylenol), ibuprofen (Advil, Motrin), or naproxen (Aleve), to reduce fever and discomfort. Read and follow all instructions on the label. Do not give aspirin to anyone younger than 20. It has been linked to Reye syndrome, a serious illness.  Do not take two or more pain medicines at the same time unless the doctor told you to. Many pain medicines have acetaminophen, which is Tylenol. Too much acetaminophen (Tylenol) can be harmful.  Try not to scratch the rash. Put cold, wet cloths on the rash to reduce itching.  Do not smoke. Smoking can make your symptoms worse. If you need help quitting, talk to your doctor about stop-smoking programs and medicines. These can increase your chances of quitting for good.  Avoid contact with people who have never had rubella and who have not been immunized.  When should you call for help?   Call your doctor now or seek immediate medical care if:    You have a fever with a stiff neck or a severe headache.     You are sensitive to light or feel very sleepy or confused.   Watch closely for changes in your health, and be sure to contact your doctor if:    You do not get better as expected.   Where can you learn more?  Go to [email or web address]://[email or web address]/patiented  Enter B812 in the search box to learn more about \"Rubella ([place] Measles): Care Instructions.\"  Current as of: June 12, 2023               Content Version: 14.0    5898-6418 [employer].   Care instructions adapted under license by your healthcare professional. If you have questions about a medical condition or this instruction, " always ask your healthcare professional. Healthwise, [facility] disclaims any warranty or liability for your use of this information.      Gonorrhea and Chlamydia: About These Tests  What is it?  These tests use a sample of urine or other body fluid to look for the bacteria that cause these sexually transmitted infections (STIs). The fluid sample can come from the cervix, vagina, rectum, throat, or eyes.  Why is this test done?  These tests may be done to:  Find out if symptoms are caused by gonorrhea or chlamydia.  Check people who are at high risk of being infected with gonorrhea or chlamydia.  Retest people several months after they have been treated for gonorrhea or chlamydia.  Check for infection in your  if you had a gonorrhea or chlamydia infection at the time of delivery.  How can you prepare for the test?  If you are going to have a urine test, do not urinate for at least 1 hour before the test.  If you think you may have chlamydia or gonorrhea, don't have sexual intercourse until you get your test results. And you may want to have tests for other STIs, such as HIV.  How is the test done?  For a direct sample, a swab is used to collect body fluid from the cervix, vagina, rectum, throat, or eyes. Your doctor may collect the sample. Or you may be given instructions on how to collect your own sample.  For a urine sample, you will collect the urine that comes out when you first start to urinate. Don't wipe the genital area clean before you urinate.  How long does the test take?  The test will take a few minutes.  What happens after the test?  You will be able to go home right away.  You can go back to your usual activities right away.  If you do have an infection, don't have sexual intercourse for 7 days after you start treatment. And your sex partner(s) should also be treated.  Follow-up care is a key part of your treatment and safety. Be sure to make and go to all appointments, and call your doctor  "if you are having problems. It's also a good idea to keep a list of the medicines you take. Ask your doctor when you can expect to have your test results.  Where can you learn more?  Go to [email or web address]  Enter K976 in the search box to learn more about \"Gonorrhea and Chlamydia: About These Tests.\"  Current as of: November 27, 2023               Content Version: 14.0    7882-1571 [employer].   Care instructions adapted under license by your healthcare professional. If you have questions about a medical condition or this instruction, always ask your healthcare professional. [employer] disclaims any warranty or liability for your use of this information.      Trichomoniasis: About This Test  What is it?     This test uses a sample of urine or other body fluid to look for the tiny parasite that causes trichomoniasis (also called trich). The fluid sample can come from the vagina, cervix, or urethra. Your doctor may choose to use one or more of many available tests.  Why is it done?  A trich test may be done to:  Find out if symptoms are caused by trich.  Check people who are at high risk for being infected with trich.  Check after treatment to make sure that the infection is gone.  How do you prepare for the test?  If you are going to have a urine test, do not urinate for at least 1 hour before the test.  How is the test done?  For a direct sample, a swab is used to collect body fluid from the cervix, vagina, or urethra. Your doctor may collect the sample. Or you may be given instructions on how to collect your own sample.  For a urine sample, you will collect the urine that comes out when you first start to urinate. Don't wipe the area clean before you urinate.  How long does the test take?  It will take a few minutes to collect a sample.  What happens after the test?  You can go home right away.  You can go back to your usual activities right away.  You may get the " test results the same day or several days later. It depends on the test used.  If you do have an infection, don't have sexual intercourse for 7 days after you start treatment. Your sex partner or partners should also be treated.  Follow-up care is a key part of your treatment and safety. Be sure to make and go to all appointments, and call your doctor if you are having problems. Ask your doctor when you can expect to have your test results.  Current as of: November 27, 2023               Content Version: 14.0    2763-2144 [employer].   Care instructions adapted under license by your healthcare professional. If you have questions about a medical condition or this instruction, always ask your healthcare professional. [employer] disclaims any warranty or liability for your use of this information.      HIV Testing: Care Instructions  Overview  You can get tested for the human immunodeficiency virus (HIV). Most doctors use a blood test to check for HIV antibodies and antigens in your blood. It may also check for the genetic material (RNA) of HIV. Some tests use saliva to check for HIV antibodies. But these aren't as accurate. For example, they may give a false result if you've just been infected.  What do the results mean?    Normal (negative)    No HIV antibodies, antigens, or RNA were found.  You may need more testing. It can make sure your test results are correct.    Uncertain (indeterminate)    Test results didn't clearly show if you have an HIV infection.  HIV antibodies or antigens may not have formed yet.  Some other type of antibody or antigen may have affected the results.  You will need another test to be sure.    Abnormal (positive)    HIV antibodies, antigens, or RNA were found.  If you haven't had an RNA test yet, one will be done. If it's positive, you have HIV.  If your test result is positive, your doctor will talk to you. You will discuss starting treatment.  Follow-up care  "is a key part of your treatment and safety. Be sure to make and go to all appointments, and call your doctor if you are having problems. It's also a good idea to know your test results and keep a list of the medicines you take.  Where can you learn more?  Go to [email or web address].net/patiented  Enter T792 in the search box to learn more about \"HIV Testing: Care Instructions.\"  Current as of: June 12, 2023               Content Version: 14.0    0737-1786 [employer].   Care instructions adapted under license by your healthcare professional. If you have questions about a medical condition or this instruction, always ask your healthcare professional. [employer] disclaims any warranty or liability for your use of this information.      Hepatitis C Virus Tests: About These Tests  What are they?     Hepatitis C virus tests are blood tests that check for substances in the blood that show whether you have hepatitis C now or had it in the past. The tests can also tell you what type of hepatitis C you have and how severe the disease is. This can help your doctor with treatment.  If the tests show that you have long-term hepatitis C, you need to take steps to prevent spreading the disease.  Why are these tests done?  You may need these tests if:  You have symptoms of hepatitis.  You may have been exposed to the virus. You are more likely to have been exposed to the virus if you inject drugs or are exposed to body fluids (such as if you are a health care worker).  You've had other tests that show you have liver problems.  You are 18 to 79 years old.  You have an HIV infection.  The tests also are done to help your doctor decide about your treatment and see how well it works.  How do you prepare for the test?  In general, there's nothing you have to do before this test, unless your doctor tells you to.  How is the test done?  A health professional uses a needle to take a blood sample, usually from " "the arm.  What happens after these tests?  You will probably be able to go home right away.  You can go back to your usual activities right away.  Follow-up care is a key part of your treatment and safety. Be sure to make and go to all appointments, and call your doctor if you are having problems. It's also a good idea to keep a list of the medicines you take. Ask your doctor when you can expect to have your test results.  Where can you learn more?  Go to [email or web address]/[email or web address]  Enter W551 in the search box to learn more about \"Hepatitis C Virus Tests: About These Tests.\"  Current as of: June 12, 2023               Content Version: 14.0    0323-6080 [employer].   Care instructions adapted under license by your healthcare professional. If you have questions about a medical condition or this instruction, always ask your healthcare professional. [employer] disclaims any warranty or liability for your use of this information.      Learning About Fetal Ultrasound Results  What is a fetal ultrasound?     Fetal ultrasound is a test that lets your doctor see an image of your baby. Your doctor learns information about your baby from this picture. You may find out, for example, if you are having a boy or a girl. But the main reason you have this test is to get information about your baby's health.  (You may hear your baby called a fetus. This is a common medical term for a baby that's growing in the mother's uterus.)  What kind of information can you learn from this test?  The findings of an ultrasound fall into two categories, normal and abnormal.  Normal  The fetus is the right size for its age.  The placenta is the expected size and does not cover the cervix.  There is enough amniotic fluid in the uterus.  No birth defects can be seen.  Abnormal  The fetus is small or large for its age.  The placenta covers the cervix.  There is too much or too little amniotic fluid in the " uterus.  The fetus may have a birth defect.  What does an abnormal result mean?  Abnormal seems to imply that something is wrong with your baby. But what it means is that the test has shown something the doctor wants to take a closer look at.  And that's what happens next. Your doctor will talk to you about what further test or tests you may need.  What do the results mean?  Some of the things your doctor may see on an abnormal ultrasound include:  Echogenic bowel.  The bowel looks very bright on the screen. This could mean that there's blood in the bowel. Or it could mean that something is blocking the small bowel.  Increased nuchal translucency.  The ultrasound measures the thickness at the back of the baby's neck. An increase in thickness is sometimes an early sign of Down syndrome.  Increased or decreased amniotic fluid.  The doctor will look for a reason for the level of amniotic fluid and will watch the pregnancy closely as it progresses.  Large ventricles.  Ventricles in the brain look larger than they should. Your doctor may take a closer look at the brain.  Renal pyelectasis/hydronephrosis.  The ultrasound measures the fluid around the kidney. If there is more fluid than expected, there is a chance of urinary tract or kidney problems.  Short long bones.  The ultrasound measures certain arm and leg bones. A long bone (humerus or femur) that is shorter than average could be a sign of Down syndrome.  Subchorionic hemorrhage.  An ultrasound can show bleeding under one of the membranes that surrounds the fetus. Some women don't have symptoms of bleeding. The ultrasound can find this problem when women are not bleeding from their vagina. Women who have this condition have a slightly higher chance of miscarriage.  What do you do now?  Take a deep breath, and let it out. Keep in mind that an abnormal finding on an ultrasound, after it's coupled with more information, may:  Turn out to be nothing.  Turn out to be  "something mild that won't affect the baby.  Turn out to be something more serious. But if this happens, early diagnosis helps you and your doctor plan treatment options sooner rather than later.  Your medical team is there for you. So are your family and friends. Ask questions, and get the help and support you need.  Follow-up care is a key part of your treatment and safety. Be sure to make and go to all appointments, and call your doctor if you are having problems. It's also a good idea to know your test results and keep a list of the medicines you take.  Where can you learn more?  Go to [email or web address]://[email or web address].[email or web address]/patiented  Enter K451 in the search box to learn more about \"Learning About Fetal Ultrasound Results.\"  Current as of: July 10, 2023               Content Version: 14.0    7610-7142 [employer].   Care instructions adapted under license by your healthcare professional. If you have questions about a medical condition or this instruction, always ask your healthcare professional. [employer] disclaims any warranty or liability for your use of this information.      Learning About Prenatal Visits  Overview     Regular prenatal visits are very important during any pregnancy. These quick office visits may seem simple and routine. But they can help you have a safe and healthy pregnancy. Your doctor is watching for problems that can only be found through regular checkups. The visits also give you and your doctor time to build a good relationship.  After your first visit, you will most likely start on a schedule of monthly visits. In your third trimester, the visits will get more frequent. Based on your health, your age, and if you've had a normal, full-term pregnancy before, your doctor may want to see you more or less often.  At different times in your pregnancy, you will have exams and tests. Some are routine. Others are done only when there is a chance of a problem. Everything healthy " you do for your body helps you have a healthy pregnancy. Rest when you need it. Eat well, drink plenty of water, and exercise regularly.  What happens during a prenatal visit?  You will have blood pressure checks, along with urine tests. You also may have blood tests. If you need to go to the bathroom while waiting for the doctor, tell the nurse. You will be given a sample cup so your urine can be tested.  You will be weighed and have your belly measured.  Your doctor may listen to the fetal heartbeat with a special device.  At about 24 weeks, and possibly earlier in your pregnancy, your doctor will check your blood sugar (glucose tolerance test) for diabetes that can occur during pregnancy. This is gestational diabetes, which can be harmful.  You will have tests to check for infections that could harm your . These include group B streptococcus and hepatitis B.  Your doctor may do ultrasounds to check for problems. This also checks the position of the fetus. An ultrasound uses sound waves to produce a picture of the fetus.  You may get your vaccines updated.  Your doctor may ask you questions to check for signs of anxiety or depression. Tell your doctor if you feel sad, anxious, or hopeless for more than a few days.  You may have other tests at any time during your pregnancy.  Use your visits to discuss with your doctor any concerns you have.  How can you care for yourself at home?  Get plenty of rest.  Try to exercise every day, if your doctor says it is okay. If you have not exercised in the past, start out slowly. For example, you can take short walks each day.  Choose healthy foods, such as fruits, vegetables, whole grains, lean proteins, low-fat dairy, and healthy fats.  Drink plenty of fluids. Cut down on drinks with caffeine, such as coffee, tea, and cola. If you have kidney, heart, or liver disease and have to limit fluids, talk with your doctor before you increase the amount of fluids you drink.  Try  "to avoid chemical fumes, paint fumes, and poisons.  If you smoke, vape, or use alcohol, marijuana, or other drugs, quit or cut back as much as you can. Talk to your doctor if you need help quitting.  Review all of your medicines, including over-the-counter medicines and supplements, with your doctor. Some of your routine medicines may need to be changed. Do not stop or start taking any medicines without talking to your doctor first.  Follow-up care is a key part of your treatment and safety. Be sure to make and go to all appointments, and call your doctor if you are having problems. It's also a good idea to know your test results and keep a list of the medicines you take.  Where can you learn more?  Go to https://www.[employer].net/patiented  Enter J502 in the search box to learn more about \"Learning About Prenatal Visits.\"  Current as of: July 10, 2023               Content Version: 14.0    5894-2375 [employer].   Care instructions adapted under license by your healthcare professional. If you have questions about a medical condition or this instruction, always ask your healthcare professional. [employer] disclaims any warranty or liability for your use of this information.      Intimate Partner Violence: Care Instructions  Overview     If you want to save this information but don't think it is safe to take it home, see if a trusted friend can keep it for you. Plan ahead. Know who you can call for help, and memorize the phone number.   Be careful online too. Your online activity may be seen by others. Do not use your personal computer or device to read about this topic. Use a safe computer, such as one at work, a friend's home, or a library.    Intimate partner violence--a type of domestic abuse--is different from an argument now and then. It is a pattern of abuse that one person may use to control another person's behavior. It may start with threats and name-calling. Then, it may lead to " more serious acts, like pushing and slapping. The abuse also may occur in other areas. For example, the abuser may withhold money or spend a partner's money without their knowledge.  Abuse can cause serious harm. You are more likely to have a long-term health problem from the injuries and stress of living in a violent relationship. People who are sexually abused by their partners have more sexually transmitted infections and unplanned pregnancies. Anyone who is abused also faces emotional pain. Anyone can be abused in relationships. In some relationships, both people use abusive behavior.  If you are pregnant, abuse can cause problems such as poor weight gain, infections, and bleeding. Abuse during this time may increase your baby's risk of low birth weight, premature birth, and death.  Follow-up care is a key part of your treatment and safety. Be sure to make and go to all appointments, and call your doctor if you are having problems. It's also a good idea to know your test results and keep a list of the medicines you take.  How can you care for yourself at home?  If you do not have a safe place to stay, discuss this with your doctor before you leave.  Have a plan for where to go, how to leave your home, and where to stay in case of an emergency. Do not tell your partner about your plan. Contact:  The National Domestic Violence Hotline toll-free at 1-327.979.3713. They can help you find resources in your area.  Your local police department, hospital, or clinic for information about shelters and safe homes near you.  Talk to a trusted friend or neighbor, a counselor, or a leon leader. Do not feel that you have to hide what happened.  Teach your children how to call for help in an emergency.  Be alert to warning signs, such as threats, heavy alcohol use, or drug use. This can help you avoid danger.  If you can, make sure that there are no guns or other weapons in your home.  When should you call for help?   Call 305  "anytime you think you may need emergency care. For example, call if:    You or someone else has just been abused.     You think you or someone else is in danger of being abused.   Watch closely for changes in your health, and be sure to contact your doctor if you have any problems.  Where can you learn more?  Go to https://www.[employer].net/patiented  Enter G282 in the search box to learn more about \"Intimate Partner Violence: Care Instructions.\"  Current as of: June 24, 2023               Content Version: 14.0    9869-0935 [employer].   Care instructions adapted under license by your healthcare professional. If you have questions about a medical condition or this instruction, always ask your healthcare professional. [employer] disclaims any warranty or liability for your use of this information.      Intimate Partner Violence Safety Instructions: Care Instructions  Overview     If you want to save this information but don't think it is safe to take it home, see if a trusted friend can keep it for you. Plan ahead. Know who you can call for help, and memorize the phone number.   Be careful online too. Your online activity may be seen by others. Do not use your personal computer or device to read about this topic. Use a safe computer, such as one at work, a friend's home, or a library.    When you are abused by a spouse or partner, you can take actions to protect yourself and your children.  You can increase your safety whether you decide to stay with your spouse or partner or you decide to leave. You may want to make a safety plan and pack a bag ahead of time. This will help you leave quickly when you decide to. Remember, you cannot change your partner's actions, but you can find help for you and your children. No one deserves to be abused.  Follow-up care is a key part of your treatment and safety. Be sure to make and go to all appointments, and call your doctor if you are having " problems. It's also a good idea to know your test results and keep a list of the medicines you take.  How can you care for yourself at home?  Make a plan for your safety   If you decide to stay with your abusive spouse or partner, you can do the following to increase your safety:  Decide what works best to keep you safe in an emergency.  Know who you can call to help you in an emergency.  Decide if you will call the police if you get hurt again. If you can, agree on a signal with your children or neighbor to call the police for you if you need help. You can flash lights or hang something out of a window.  Choose a safe place to go for a short time if you need to leave home. Memorize the address and phone number.  Learn escape routes out of your home in case you need to leave in a hurry. Teach your children different ways to get out of your home quickly if they need to.  If you can, hide or lock up things that can be used as weapons (guns, knives, hammers).  Learn the number of a domestic violence shelter. Talk to the people there about how they can help.  Find out about other community resources that can help you.  Take pictures of bruises or other injuries if you can. You can also take pictures of things your abuser has broken.  Teach your children that violence is never okay. Tell them that they do not deserve to be hurt.  Pack a bag   Prepare a bag with things you will need if you leave suddenly. Leave it with a friend, a relative, or someone else you trust. You could include the following items in the bag:  A set of keys to your home and car.  Emergency phone numbers and addresses.  Money such as cash or checks. You can also ask a friend, a relative, or someone else you trust to hold money for you.  Copies of legal documents such as house and car titles or rent receipts, birth certificates, Social Security card, voter registration, marriage and 's licenses, and your children's health records.  Personal  "items you would need for a few days, such as clothes, a toothbrush, toothpaste, and any medicines you or your children need.  A favorite toy or book for your child or children.  Diapers and bottles, if you have very young children.  Pictures that show signs of abuse and violence. You may also add pictures of your abuser.  If you leave   If you decide to leave, you can take the following steps:  Go to the emergency room at a hospital if you have been hurt.  Think about asking the police to be with you as you leave. They can protect you as you leave your home.  If you decide to leave secretly, remember that activities can be tracked. Your abuser may still have access to your cell phone, email, and credit cards. It may be possible for these to be traced. Always be aware of your surroundings.  If this is an emergency, do not worry about gathering up anything. Just leave--your safety is most important.  If your abuser moves out, change the locks on the doors. If you have a security system, change the access code.  When should you call for help?   Call 911 anytime you think you may need emergency care. For example, call if:    You or someone else has just been abused.     You think you or someone else is in danger of being abused.   Watch closely for changes in your health, and be sure to contact your doctor if you have any problems.  Where can you learn more?  Go to https://www.[employer].net/patiented  Enter A752 in the search box to learn more about \"Intimate Partner Violence Safety Instructions: Care Instructions.\"  Current as of: June 24, 2023               Content Version: 14.0    6582-7754 [employer].   Care instructions adapted under license by your healthcare professional. If you have questions about a medical condition or this instruction, always ask your healthcare professional. [employer] disclaims any warranty or liability for your use of this information.      Learning About " Intimate Partner Violence  What is intimate partner violence?  Intimate partner violence is a type of domestic abuse. It's threatening, emotionally harmful, or violent behavior in a personal relationship. It can happen between past or current partners or spouses. In some relationships both people abuse each other. One partner may be more abusive. Or the abuse may be equal.  Abuse can affect people of any ethnic group, race, or Hinduism. It can affect teens, adults, or the elderly. And it can happen to people of any sexual orientation, gender, or social status.  Abusers use fear, bullying, and threats to control their partners. They may control what their partners do. They may control where their partners go or who they see. They may act jealous, controlling, or possessive. These early signs of abuse may happen soon after the start of the relationship. Sometimes it can be hard to notice abuse at first. But after the relationship becomes more serious, the abuse may get worse.  If you are being abused in your relationship, it's important to get help. The abuse is not your fault. You don't have to face it alone.  Be careful  It may not be safe to take home domestic abuse information like this handout. Some people ask a trusted friend to keep it for them. It's also important to plan ahead and to memorize the phone number of places you can go for help. If you are concerned about your safety, do not use your computer, smartphone, or tablet to read about domestic abuse.   What are the types of intimate partner violence?  Abuse can happen in different ways. Each type can happen on its own or in combination with others.  Emotional abuse  Emotional abuse is a pattern of threats, insults, or controlling behavior. It includes verbal abuse. It goes beyond healthy disagreements in a relationship. It's a sign of an unhealthy relationship.  Do you feel threatened, intimidated, or controlled?  Does your partner:  Threaten your  children, other family members, or pets?  Use jokes meant to embarrass or shame you?  Call you names?  Tell you that you are a bad parent?  Threaten to take away your children?  Threaten to have you or your family members deported?  Control your access to money or other basic needs?  Control what you do, who you see or talk to, or where you go?  Another form of emotional abuse is denying that it is happening. Or the abuser may act like the abuse is no big deal or is your fault.  Sexual abuse  With sexual abuse, abusers may try to convince or force you to have sex. They may force you into sex acts you're not comfortable with. Or they may sexually assault you. Sexual abuse can happen even if you are in a committed relationship.  Physical abuse  Physical abuse means that a partner hits, kicks, or does something else to physically hurt you. Physical abuse that starts with a slap might lead to kicking, shoving, and choking over time. The abuser may also threaten to hurt or kill you.  Stalking  Stalking means that an abuser gives you attention that you do not want and that causes you fear. Examples of stalking include:  Following you.  Showing up at places where the abuser isn't invited, such as at your work or school.  Constantly calling or texting you.  What problems can  to?  Intimate partner violence can be very dangerous. It can cause serious, repeated injury. It can even lead to death.  All forms of abuse can cause long-term health problems from the stress of a violent relationship. Verbal abuse can lead to sexual and physical abuse.  Abuse causes:  Emotional pain.  Depression.  Anxiety.  Post-traumatic stress.  Sexual abuse can lead to sexually transmitted infections (such as HIV/AIDS) and unplanned pregnancy.  Pregnancy can be a very dangerous time for people in abusive relationships. Abuse can cause or increase the risk of problems during pregnancy. These include low weight gain, anemia, infections, and  "bleeding. Abuse may also increase your baby's risk of low birth weight, premature birth, and death.  It can be hard for some victims of abuse to ask for help or to leave their relationship. You may feel scared, stuck, or not sure what steps to take. But it's important not to ignore abuse. Talking to someone you trust could be the first step to ending the abuse and taking care of your own health and happiness again. There are resources available that can help keep you safe.  Where can you get help?  Talk to a trusted friend. Find a local advocacy group, or talk to your doctor about the abuse.  Contact the National Domestic Violence Hotline at 9-750-709-DSCI (1-966.676.7177) for more safety tips. They can guide you to groups in your area that can help. Or go to the National Coalition Against Domestic Violence website at www.thehotlProximic.org to learn more.  Domestic violence groups or a counselor in your area can help you make a safety plan for yourself and your children.  When to call for help  Call 911 anytime you think you may need emergency care. For example, call if:  You think that you or someone you know is in danger of being abused.  You have been hurt and can't have someone safely take you to emergency care.  You have just been abused.  A family member has just been abused.  Where can you learn more?  Go to https://www.[employer].net/patiented  Enter S665 in the search box to learn more about \"Learning About Intimate Partner Violence.\"  Current as of: June 24, 2023               Content Version: 14.0    1046-2254 [employer].   Care instructions adapted under license by your healthcare professional. If you have questions about a medical condition or this instruction, always ask your healthcare professional. [employer] disclaims any warranty or liability for your use of this information.      Vaginal Bleeding During Pregnancy: Care Instructions  Overview     It's common to have some " vaginal spotting when you are pregnant. In some cases, the bleeding isn't serious. And there aren't any more problems with the pregnancy.  But sometimes bleeding is a sign of a more serious problem. This is more common if the bleeding is heavy or painful. Examples of more serious problems include miscarriage, an ectopic pregnancy, and a problem with the placenta.  You may have to see your doctor again to be sure everything is okay. You may also need more tests to find the cause of the bleeding.  Home treatment may be all you need. But it depends on what is causing the bleeding. Be sure to tell your doctor if you have any new symptoms or if your symptoms get worse.  The doctor has checked you carefully, but problems can develop later. If you notice any problems or new symptoms, get medical treatment right away.  Follow-up care is a key part of your treatment and safety. Be sure to make and go to all appointments, and call your doctor if you are having problems. It's also a good idea to know your test results and keep a list of the medicines you take.  How can you care for yourself at home?  If your doctor prescribed medicines, take them exactly as directed. Call your doctor if you think you are having a problem with your medicine.  Do not have vaginal sex until your doctor says it's okay.  Do not put anything in your vagina until your doctor says it's okay.  Ask your doctor about other activities you can or can't do.  Get a lot of rest. Being pregnant can make you tired.  Do not use nonsteroidal anti-inflammatory drugs (NSAIDs), such as ibuprofen (Advil, Motrin), naproxen (Aleve), or aspirin, unless your doctor says it is okay.  When should you call for help?   Call 911 anytime you think you may need emergency care. For example, call if:    You passed out (lost consciousness).     You have severe vaginal bleeding. This means you are soaking through a pad each hour for 2 or more hours.     You have sudden, severe pain  "in your belly or pelvis.   Call your doctor now or seek immediate medical care if:    You have new or worse vaginal bleeding.     You are dizzy or lightheaded, or you feel like you may faint.     You have pain in your belly, pelvis, or lower back.     You think that you are in labor.     You have a sudden release of fluid from your vagina.     You've been having regular contractions for an hour. This means that you've had at least 8 contractions within 1 hour or at least 4 contractions within 20 minutes, even after you change your position and drink fluids.     You notice that your baby has stopped moving or is moving much less than normal.   Watch closely for changes in your health, and be sure to contact your doctor if you have any problems.  Where can you learn more?  Go to https://www.[employer].net/patiented  Enter N829 in the search box to learn more about \"Vaginal Bleeding During Pregnancy: Care Instructions.\"  Current as of: July 10, 2023               Content Version: 14.0    2455-7386 [employer].   Care instructions adapted under license by your healthcare professional. If you have questions about a medical condition or this instruction, always ask your healthcare professional. [employer] disclaims any warranty or liability for your use of this information.      "

## 2024-06-06 LAB
ABO/RH(D): NORMAL
ANTIBODY SCREEN: NEGATIVE
SPECIMEN EXPIRATION DATE: NORMAL

## 2024-06-07 ENCOUNTER — HOSPITAL ENCOUNTER (OUTPATIENT)
Dept: ULTRASOUND IMAGING | Facility: CLINIC | Age: 29
Discharge: HOME | End: 2024-06-07
Attending: FAMILY MEDICINE | Admitting: FAMILY MEDICINE
Payer: COMMERCIAL

## 2024-06-07 ENCOUNTER — LAB (OUTPATIENT)
Dept: LAB | Facility: CLINIC | Age: 29
End: 2024-06-07
Payer: COMMERCIAL

## 2024-06-07 DIAGNOSIS — Z34.80 ENCOUNTER FOR SUPERVISION OF NORMAL PREGNANCY IN MULTIGRAVIDA: ICD-10-CM

## 2024-06-07 LAB
ALBUMIN UR-MCNC: NEGATIVE MG/DL
APPEARANCE UR: CLEAR
BILIRUB UR QL STRIP: NEGATIVE
COLOR UR AUTO: YELLOW
ERYTHROCYTE [DISTWIDTH] IN BLOOD BY AUTOMATED COUNT: 12.1 % (ref 10–15)
GLUCOSE UR STRIP-MCNC: NEGATIVE MG/DL
HBA1C MFR BLD: 5.1 %
HCT VFR BLD AUTO: 39.9 % (ref 35–47)
HGB BLD-MCNC: 13.9 G/DL (ref 11.7–15.7)
HGB UR QL STRIP: NEGATIVE
KETONES UR STRIP-MCNC: NEGATIVE MG/DL
LEUKOCYTE ESTERASE UR QL STRIP: NEGATIVE
MCH RBC QN AUTO: 30.8 PG (ref 26.5–33)
MCHC RBC AUTO-ENTMCNC: 34.8 G/DL (ref 31.5–36.5)
MCV RBC AUTO: 89 FL (ref 78–100)
NITRATE UR QL: NEGATIVE
PH UR STRIP: 6 [PH] (ref 5–7)
PLATELET # BLD AUTO: 309 10E3/UL (ref 150–450)
RBC # BLD AUTO: 4.51 10E6/UL (ref 3.8–5.2)
SP GR UR STRIP: 1 (ref 1–1.03)
UROBILINOGEN UR STRIP-MCNC: NORMAL MG/DL
WBC # BLD AUTO: 9.7 10E3/UL (ref 4–11)

## 2024-06-07 PROCEDURE — 86762 RUBELLA ANTIBODY: CPT

## 2024-06-07 PROCEDURE — 86900 BLOOD TYPING SEROLOGIC ABO: CPT

## 2024-06-07 PROCEDURE — 86850 RBC ANTIBODY SCREEN: CPT

## 2024-06-07 PROCEDURE — 87340 HEPATITIS B SURFACE AG IA: CPT

## 2024-06-07 PROCEDURE — 83036 HEMOGLOBIN GLYCOSYLATED A1C: CPT

## 2024-06-07 PROCEDURE — 86780 TREPONEMA PALLIDUM: CPT

## 2024-06-07 PROCEDURE — 87086 URINE CULTURE/COLONY COUNT: CPT

## 2024-06-07 PROCEDURE — 85027 COMPLETE CBC AUTOMATED: CPT

## 2024-06-07 PROCEDURE — 36415 COLL VENOUS BLD VENIPUNCTURE: CPT

## 2024-06-07 PROCEDURE — 86803 HEPATITIS C AB TEST: CPT

## 2024-06-07 PROCEDURE — 81003 URINALYSIS AUTO W/O SCOPE: CPT

## 2024-06-07 PROCEDURE — 87389 HIV-1 AG W/HIV-1&-2 AB AG IA: CPT

## 2024-06-07 PROCEDURE — 86901 BLOOD TYPING SEROLOGIC RH(D): CPT

## 2024-06-07 PROCEDURE — 76801 OB US < 14 WKS SINGLE FETUS: CPT

## 2024-06-08 LAB
BACTERIA UR CULT: NO GROWTH
HBV SURFACE AG SERPL QL IA: NONREACTIVE
HCV AB SERPL QL IA: NONREACTIVE
HIV 1+2 AB+HIV1 P24 AG SERPL QL IA: NONREACTIVE
RUBV IGG SERPL QL IA: 10.8 INDEX
RUBV IGG SERPL QL IA: POSITIVE
T PALLIDUM AB SER QL: NONREACTIVE

## 2024-06-21 ENCOUNTER — PRENATAL OFFICE VISIT (OUTPATIENT)
Dept: FAMILY MEDICINE | Facility: CLINIC | Age: 29
End: 2024-06-21
Payer: COMMERCIAL

## 2024-06-21 VITALS
HEIGHT: 67 IN | WEIGHT: 137 LBS | BODY MASS INDEX: 21.5 KG/M2 | DIASTOLIC BLOOD PRESSURE: 64 MMHG | OXYGEN SATURATION: 100 % | SYSTOLIC BLOOD PRESSURE: 100 MMHG | TEMPERATURE: 97.6 F | HEART RATE: 87 BPM | RESPIRATION RATE: 18 BRPM

## 2024-06-21 DIAGNOSIS — Z34.91 PRENATAL CARE IN FIRST TRIMESTER: Primary | ICD-10-CM

## 2024-06-21 DIAGNOSIS — Z98.891 HISTORY OF C-SECTION: ICD-10-CM

## 2024-06-21 DIAGNOSIS — Z86.32 HISTORY OF GESTATIONAL DIABETES: ICD-10-CM

## 2024-06-21 PROBLEM — Z34.90 PRENATAL CARE: Status: ACTIVE | Noted: 2024-06-21

## 2024-06-21 LAB
C TRACH DNA SPEC QL NAA+PROBE: NEGATIVE
CLUE CELLS: ABNORMAL
N GONORRHOEA DNA SPEC QL NAA+PROBE: NEGATIVE
TRICHOMONAS, WET PREP: ABNORMAL
WBC'S/HIGH POWER FIELD, WET PREP: ABNORMAL
YEAST, WET PREP: ABNORMAL

## 2024-06-21 PROCEDURE — 99459 PELVIC EXAMINATION: CPT | Performed by: FAMILY MEDICINE

## 2024-06-21 PROCEDURE — 99213 OFFICE O/P EST LOW 20 MIN: CPT | Performed by: FAMILY MEDICINE

## 2024-06-21 PROCEDURE — 87210 SMEAR WET MOUNT SALINE/INK: CPT | Performed by: FAMILY MEDICINE

## 2024-06-21 PROCEDURE — 87491 CHLMYD TRACH DNA AMP PROBE: CPT | Performed by: FAMILY MEDICINE

## 2024-06-21 PROCEDURE — G0145 SCR C/V CYTO,THINLAYER,RESCR: HCPCS | Performed by: FAMILY MEDICINE

## 2024-06-21 PROCEDURE — 87591 N.GONORRHOEAE DNA AMP PROB: CPT | Performed by: FAMILY MEDICINE

## 2024-06-21 ASSESSMENT — PAIN SCALES - GENERAL: PAINLEVEL: NO PAIN (0)

## 2024-06-21 NOTE — Clinical Note
Please schedule her prenatal visits for the rest of the pregnancy with me - every 4 weeks until 32 week then every 2 weeks until 36 week and then weekly until 41 weeks.  thanks

## 2024-06-21 NOTE — PATIENT INSTRUCTIONS
Weeks 10 to 14 of Your Pregnancy: Care Instructions  It's now possible to hear the fetus's heartbeat with a special ultrasound device. And the fetus's organs are developing.    Decide about tests to check for birth defects. Think about your age, your chance of passing on a family disease, your need to know about any problems, and what you might do after you have the test results.    It's okay to exercise. Try activities such as walking or swimming. Check with your doctor before starting a new program.    You may feel more tired than usual.  Taking naps during the day may help.     You may feel emotional.  It might help to talk to someone.     You may have headaches.  Try lying down and putting a cool cloth over your forehead.     You can use acetaminophen (Tylenol) for pain relief.  Don't take any anti-inflammatory medicines (such as Advil, Motrin, Aleve), unless your doctor says it's okay.     You may feel a fullness or aching in your lower belly.  This can feel like the kind of cramps you might get before a period. A back rub may help.     You may need to urinate more.  Your growing uterus and changing hormones can affect your bladder.     You may feel sick to your stomach (morning sickness).  Try avoiding food and smells that make you feel sick.     Your breasts may feel different.  They may feel tender or get bigger. Your nipples may get darker. Try a bra that gives you good support.     Avoid alcohol, tobacco, and drugs (including marijuana).  If you need help quitting, talk to your doctor.     Take a daily prenatal vitamin.  Choose one with folic acid.   Follow-up care is a key part of your treatment and safety. Be sure to make and go to all appointments, and call your doctor if you are having problems. It's also a good idea to know your test results and keep a list of the medicines you take.  Where can you learn more?  Go to https://www.healthwise.net/patiented  Enter E090 in the search box to learn more  "about \"Weeks 10 to 14 of Your Pregnancy: Care Instructions.\"  Current as of: July 10, 2023               Content Version: 14.0    6618-3882 [employer].   Care instructions adapted under license by your healthcare professional. If you have questions about a medical condition or this instruction, always ask your healthcare professional. [employer] disclaims any warranty or liability for your use of this information.      "

## 2024-06-21 NOTE — PROGRESS NOTES
SUBJECTIVE:     HPI:    Brenda is a 29 year old female with  at 10w4d  who is seen today for new ob visit.    Estimated Date of Delivery: 25 - based on early US (9w3d)    Planned Pregnancy: Yes  Marital Status: Yes  Occupation: HR  Living in Household: Spouse and Children  She has not had bleeding since her LMP.    STI HISTORY: None    Pre Term Labor Risk Assessment   Is the patient's age <18 or >40?  No  Does the patient have a BMI < 18.5?  No  If previous pregnancy, was delivery within previous 6 months?  No  Have you ever been diagnosed with pyelonephritis?  No  Have you ever delivered a baby prior to 37 weeks gestation?  No  Have you ever been told you have a uterine anomaly?  No  Do you currently have uterine fibroids?  No  Have you had any gynecological surgical procedures such as cervical conization, a LEEP procedure, laser treatment or cryosurgery of the cervix?  No  Did your mother take JUJU or any other hormones when she was pregnant with you?  No  Did conception for this pregnancy occur via In Vitro Fertilization?  No  Are you carrying twins?  No  Do you currently use tobacco products?  No  Prior to this pregnancy, how much alcohol did you drink each week? (if >7/week= high risk..)  No  Since you learned you were pregnant, how much beer, wine or hard liquor did you drink per week? (anything >0 = high risk) no  Prior to learning you are pregnant, did you use any of the following: marijuana, prescription narcotics, speed, cocaine, heroin, hallucinogens or other drugs? (any use within 1 yr = high risk) no  Since you learned you are pregnant, have you used any of the following: marijuana, prescription narcotics, speed, cocaine, heroin, hallucinogens or other drugs? (any use = high risk) no  Do you have a history of chemical dependency (yes=high risk) no  Have you ever been treated for more than 1 Urinary Tract Infection during this pregnancy?  No  Do you have a history of Depression, Bi-polar  disorder, anxiety, schizophrenia or other mental illness?  No  Are you currently being treated for depression, bi-polar disorder, anxiety, schizophrenia or other mental illness?  No  Have you had Chlamydia or gonorrhea during this pregnancy?  No  Periodontal disease (gum disease)?  No  Has anyone hit, slapped, kicked or otherwise hurt you?  No  Has anyone forced you to have sex when you didn't want to?  No    Summary:  Patient is not high risk for  Labor     Brenda is here today for her first OB visit. Met with the OB educator, received the OB package and prenatal labs were done. She is  and this would the second child for her and  (Elayne).  LMP was 2024 (approximate), which was was a normal period.   Her menstrual period have been regular. Planned and desired pregnancy. Was not on birth control.   State that she has been doing well. Denies emesis, abdominal pain, fatigue, headache, loss of appetite, vaginal discharge, dysuria, pelvic pain, urinary urgency, lightheadedness, urinary frequency, vaginal bleeding, and constipation.  Nausea on and off, but tolerating oral intake well, been drinking a lot of water.  No fever or chill. No cramping or vaginal bleeding.  Denies STD risk. No hx of abnormal pap smear, last Pap smear was more than 2 years ago.  Not feeling fetal movement yet. No UTI symptoms.  No safety concerns; she works in [employer]; lives with her  and her child.  No smoking or drinking and denies of drug use.   OB history:  .  Last pregnancy was complicated with breech presentation and she had a low transverse primary  section for it.  No complication with the surgery or postpartum care.  She also had diet-controlled gestational diabetes.  No history of HTN, gHTN, preeclampsia, or group B strep  Generally she is healthy.  Not taking medication chronically.  No smoke, alcohol or drugs.    Patient Active Problem List    Diagnosis Date Noted    History of   2024     Priority: Medium    Prenatal care 2024     Priority: Medium    History of gestational diabetes 2022     Priority: Medium       Past Medical History:   Diagnosis Date    Cat-scratch disease 2004    Lyme disease     also cat scratch disease       Past Surgical History:   Procedure Laterality Date     SECTION N/A 2023    Procedure:  SECTION;  Surgeon: Kendrick Delacruz MD;  Location: [facility]    EXCISE GANGLION WRIST  2013    Procedure: EXCISE GANGLION WRIST;  excision left wrist dorsal cyst;  Surgeon: Karina Webb MD;  Location: [facility] OR    EXCISE GANGLION WRIST Right 2014    Procedure: EXCISE GANGLION WRIST;  Surgeon: Danis Hodge MD;  Location: [facility] OR       Current Outpatient Medications   Medication Sig Dispense Refill    Prenatal Vit-Fe Fumarate-FA (PRENATAL MULTIVITAMIN W/IRON) 27-0.8 MG tablet Take 1 tablet by mouth daily            Allergies   Allergen Reactions    No Known Allergies        Social History     Socioeconomic History    Marital status:      Spouse name: Elayne    Number of children: None    Years of education: None    Highest education level: None   Tobacco Use    Smoking status: Never    Smokeless tobacco: Never    Tobacco comments:     NO NICOTINE EXPOSURE @ HOME   Vaping Use    Vaping status: Never Used   Substance and Sexual Activity    Alcohol use: Not Currently     Comment: social-quit with pregnancy    Drug use: No    Sexual activity: Yes     Partners: Male   Social History Narrative    2024  Lives in [place] with her , René and son, Micha.  They have an indoor cat.  Aware of toxoplasmosis precautions.  No concerns about domestic violence.  Brenda is an      Social Determinants of Health     Interpersonal Safety: Low Risk  (2024)    Interpersonal Safety     Do you feel physically and emotionally safe where you currently live?: Yes     Within the past 12 months, have you  "been hit, slapped, kicked or otherwise physically hurt by someone?: No     Within the past 12 months, have you been humiliated or emotionally abused in other ways by your partner or ex-partner?: No       =====================================================   REVIEW OF SYSTEMS    Please see subjective otherwise the complete review of system was negative    ====================================================  PHYSICAL EXAM:  Vitals: /64   Pulse 87   Temp 97.6  F (36.4  C) (Temporal)   Resp 18   Ht 1.695 m (5' 6.73\")   Wt 62.1 kg (137 lb)   LMP 2024 (Approximate)   SpO2 100%   BMI 21.63 kg/m    BMI= Body mass index is 21.63 kg/m .    GENERAL:  Pleasant pregnant female, alert, well groomed.  SKIN:  Warm and dry, without lesions or rashes  HEAD: Symmetrical features.  EYES:  PERRLA,   MOUTH:  Buccal mucosa pink, moist without lesions.    NECK:  Thyroid without enlargement and nodules.  Lymph nodes not palpable.   LUNGS:  Clear to auscultation.  BREAST: offered but declined     HEART:  RRR without murmur.  ABDOMEN: Soft without masses , tenderness.  No CVA tenderness. No scars noted.  FHT not heard.  Fundal height was not appreciated with palpation.  Bedside ultrasound shows a healthy gestational sac with active fetus with normal activity.  MUSCULOSKELETAL:  Full range of motion  EXTREMITIES:  No edema. No significant varicosities.   GENITALIA:  No lesions noted.  Vagina is pink and moist.  Normal discharge noted.  Cervix looks normal.  No adnexal mass or tenderness.  UTERUS: Midposition, nontender.  PELVIS:   Adequate.       =========================================  ICSI Routine Prenatal Carfe Guideline  ASSESSMENT/PLAN      ICD-10-CM    1. Prenatal care in first trimester  Z34.91 Pap Screen Reflex to HPV if ASCUS - Recommended Age 25 - 29 Years     NEISSERIA GONORRHOEA PCR     CHLAMYDIA TRACHOMATIS PCR     Wet prep - Clinic Collect     HPV Hold (Lab Only)      2. History of   Z98.891     "   3. History of gestational diabetes  Z86.32           Emphasized the importance of prenatal care. Continue with prenatal vitamin daily.  She has no concern or question about the OB package. Reviewed OB lab results in details.  Encouraged to drink 8-10 glasses or 4-5 bottle (16z) of water a day.  Exercise and activity during pregnancy discussed.  Discussed about ways to prevent N/V and recommended vitamin B6 or Benadryl as needed. Educated her about symptoms that need to be seen to call in.  Medication safety in pregnancy discussed.  Stay away from cat litters completely during the pregnancy.  Encouraged to call in if she has any concern or questions.     Options for  testing for chromosomal and birth defects were discussed.  Discussed about genetic test versus screening tests; their pros and cons as well as their potential false was positive/negative.  Also discussed about option of seeing the ; she declined.  Also declined genetic test for now but will discuss with .  I recommend to have a 2 will check with her insurance for its coverage if she and her  are planning to pursue NIPS testing     Screening tests include nuchal translucency/blood marker testing in the first trimester and quad screening in the second trimester. She was informed that screening tests are not diagnostic tests and therefore false positives and negatives are distinct possibilities.  Declined first trimester screening test, but is considering if not getting the genetic test.     Discussed travel cautions - she and her have not traveled in the past 6 months.  They are not planning to travel during this pregnancy.  She was encouraged to let me know if she and her  planning to travel during this pregnancy.     BMI: 21 - Discussed about appropriate weight gain for the pregnancy (25-35).  Healthy diet and exercise discussed and encouraged.  Foods to avoid also discussed.     History of CS:     -Low  transverse  section secondary to breech presentation about 14 months ago   -She is interested to look into TOLAC - likely a good candidate   -She was informed that [facility] is no longer offering TOLAC    -Offer to transfer care to hospital where TOLAC is offered    -Checked with Dr. Pineda, OB/GYN - he is a casual staff - not sure will be able to help her.        Petra Majano Mai, MD

## 2024-06-27 LAB
BKR LAB AP GYN ADEQUACY: NORMAL
BKR LAB AP GYN INTERPRETATION: NORMAL
BKR LAB AP HPV REFLEX: NORMAL
BKR LAB AP PREVIOUS ABNORMAL: NORMAL
PATH REPORT.COMMENTS IMP SPEC: NORMAL
PATH REPORT.COMMENTS IMP SPEC: NORMAL
PATH REPORT.RELEVANT HX SPEC: NORMAL

## 2024-06-29 PROBLEM — Z98.891 HISTORY OF C-SECTION: Status: ACTIVE | Noted: 2024-06-29

## 2024-07-02 ENCOUNTER — TELEPHONE (OUTPATIENT)
Dept: FAMILY MEDICINE | Facility: CLINIC | Age: 29
End: 2024-07-02
Payer: COMMERCIAL

## 2024-07-02 ENCOUNTER — MYC MEDICAL ADVICE (OUTPATIENT)
Dept: FAMILY MEDICINE | Facility: CLINIC | Age: 29
End: 2024-07-02
Payer: COMMERCIAL

## 2024-07-02 NOTE — TELEPHONE ENCOUNTER
Petra Nielsen MD  P [facility]  Please schedule her prenatal visits for the rest of the pregnancy with me - every 4 weeks until 32 week then every 2 weeks until 36 week and then weekly until 41 weeks.  thanks

## 2024-07-09 NOTE — PLAN OF CARE
Goal Outcome Evaluation:      Plan of Care Reviewed With: patient, spouse     S-(situation): shift note    B-(background): , primary c/s for breech, 1st pp day    A-(assessment): VSS, up and independent with pp cares. Gaining confidence with breastfeeding. Increased discomfort today but ibuprofen and tylenol help. Flow light, no clots since 0730, up and voiding independently    R-(recommendations): cont routine pp cares and teaching             effusion. Soft Tissues/Bones: There is no sign of any abnormality of the superficial soft tissue structures. There is moderate enlargement of both lobes of the thyroid with heterogeneous low-density regions and several peripherally calcified nodules. The largest identifiable nodule is 1.3 cm in diameter with heavy peripheral calcification. No follow-up imaging is indicated based on the CT findings. There is moderate scoliosis of the thoracic spine convex towards the right. Abdomen/Pelvis: Organs: The liver is normal in appearance without mass and without evidence of intrahepatic biliary ductal dilatation.There has been a mild increase in splenomegaly, now with moderate splenomegaly.  The spleen measures 15.3 cm in length, previously 14.4 cm.  There is no sign of any splenic mass. The pancreas is normal in appearance.The adrenal glands are unremarkable. The gallbladder is normal in appearance. The kidneys are normal in appearance with no sign of calculus, mass, or cyst. There is no sign of hydronephrosis or hydroureter. GI/Bowel: There is stable appearance of a small bowel anastomosis in the right paramedian upper pelvis with no sign of stricture.There is no evidence of bowel obstruction. No evidence of abnormal bowel wall thickening or distension. There is stable severe diverticulosis extending throughout the colon with no sign of diverticulitis. The appendix is not visualized, but there is no sign of any inflammatory process in the area of the appendix.  Soft tissue section there is a new small fat containing right superior periumbilical hernia. Pelvis: The uterus is again seen to be absent consistent with hysterectomy. The adnexal regions are normal in appearance for the patient's age. The bladder is unremarkable in appearance. There is no sign of pelvic or inguinal mass or adenopathy. Peritoneum/Retroperitoneum: No evidence of ascites or free air. No evidence of lymphadenopathy. Aorta is normal in caliber.  meeting with patient did require greater than 35 minutes.    Electronically signed by NELLY Allan CNP on 7/9/2024 at 2:26 PM      Addendum: I have personally participated in a face-to-face history and physical exam on the date of service with the patient. I have discussed the case with the nurse practitioner. I also participated in medical decision making on the date of service and I agree with all of the pertinent clinical information unless indicated in my editing of the note. I have reviewed and edited the note above based on my findings during my history, exam, and decision making on the same day of service.     My additional thoughts:   She was seen in her room  She passed her ambulatory pulse ox testing and her o2 sat was 94% on RA with ambulation-- in was present for this  She needs to have her thyroid nodule evaluated as an OP  Cut synthroid dose back to due to hyperthyroidism  BP meds adjusted  Lungs were clear on exam    Electronically signed by Jason Bass DO on 7/9/2024 at 2:26 PM    I can be reached through [employer].

## 2024-07-19 ENCOUNTER — PRENATAL OFFICE VISIT (OUTPATIENT)
Dept: FAMILY MEDICINE | Facility: CLINIC | Age: 29
End: 2024-07-19
Payer: COMMERCIAL

## 2024-07-19 VITALS
TEMPERATURE: 98.2 F | BODY MASS INDEX: 22.54 KG/M2 | SYSTOLIC BLOOD PRESSURE: 122 MMHG | HEIGHT: 67 IN | WEIGHT: 143.6 LBS | OXYGEN SATURATION: 99 % | RESPIRATION RATE: 16 BRPM | DIASTOLIC BLOOD PRESSURE: 66 MMHG | HEART RATE: 97 BPM

## 2024-07-19 DIAGNOSIS — Z98.891 HISTORY OF C-SECTION: ICD-10-CM

## 2024-07-19 DIAGNOSIS — Z34.80 ENCOUNTER FOR SUPERVISION OF NORMAL PREGNANCY IN MULTIGRAVIDA: Primary | ICD-10-CM

## 2024-07-19 DIAGNOSIS — Z34.92 PRENATAL CARE IN SECOND TRIMESTER: ICD-10-CM

## 2024-07-19 DIAGNOSIS — Z86.32 HISTORY OF GESTATIONAL DIABETES: ICD-10-CM

## 2024-07-19 PROCEDURE — 99207 PR PRENATAL VISIT: CPT | Performed by: STUDENT IN AN ORGANIZED HEALTH CARE EDUCATION/TRAINING PROGRAM

## 2024-07-19 ASSESSMENT — PAIN SCALES - GENERAL: PAINLEVEL: NO PAIN (0)

## 2024-07-19 NOTE — PATIENT INSTRUCTIONS
"Weeks 14 to 18 of Your Pregnancy: Care Instructions  Around this time, you may start to look pregnant. Your baby is now able to pass urine. And the first stool (meconium) is starting to collect in your baby's intestines. Hair is starting to grow on your baby's head.    You may notice some skin changes, such as itchy spots on your palms or acne on your face.    At your next doctor visit, you may have an ultrasound. So you might think about whether you want to know the sex of your baby. Also ask your doctor about flu and COVID-19 shots.     How to reduce stress   Ask for help when you need it.  Try to avoid things that cause you stress.  Seek out things that relieve stress, such as breathing exercises or yoga.     How to get exercise   If you don't usually exercise, start slowly. Short walks may be a good choice.  Try to be active 30 minutes a day, at least 5 days a week.  Avoid activities where you're more likely to fall.  Use light weights to reduce stress on your joints.     How to stay at a healthy weight for you   Talk to your doctor or midwife about how much weight you should gain.  It's generally best to gain:  About 28 to 40 pounds if you're underweight.  About 25 to 35 pounds if you're at a healthy weight.  About 15 to 25 pounds if you're overweight.  About 11 to 20 pounds if you're very overweight (obese).  Follow-up care is a key part of your treatment and safety. Be sure to make and go to all appointments, and call your doctor if you are having problems. It's also a good idea to know your test results and keep a list of the medicines you take.  Where can you learn more?  Go to https://www.[employer].net/patiented  Enter I453 in the search box to learn more about \"Weeks 14 to 18 of Your Pregnancy: Care Instructions.\"  Current as of: July 10, 2023               Content Version: 14.0    8408-5988 [employer].   Care instructions adapted under license by your healthcare professional. If you have " questions about a medical condition or this instruction, always ask your healthcare professional. Healthwise, [facility] disclaims any warranty or liability for your use of this information.      Second-Trimester Fetal Ultrasound: About This Test  What is it?     Fetal ultrasound is a test that uses sound waves to make pictures of your baby (fetus) and placenta inside the uterus. The test is the safest way to find out the age, size, and position of your baby. You also may be able to find out the sex of your baby. (But the test isn't done just to find out a baby's sex.)  No known risks to the mother or the baby are linked to fetal ultrasound. But you may feel anxious if the test reveals a problem with your pregnancy or baby.  Why is this test done?  In the second trimester, a fetal ultrasound is done to:  Estimate the number of weeks and days a fetus has developed since the beginning of the pregnancy. This is called the gestational age.  Look at the size and position of the fetus, the placenta, and the fluid that surrounds the fetus.  Find major birth defects, such as heart problems or problems with the brain and spinal cord (neural tube defects). But the test may not be able to find many minor defects and some major birth defects.  How do you prepare for the test?  In general, there's nothing you have to do before this test, unless your doctor tells you to.  How is the test done?  You may be able to leave your clothes on, or you will be given a gown to wear.  You will lie on your back on a padded examination table.  A gel will be spread on your belly. It will be removed after the test.  A small, handheld device called a transducer will be pressed against the gel on your skin and moved across your belly several times.  You may watch the monitor to see the picture of your baby during the test.  What happens after the test?  You will probably be able to go home right away.  You most likely will be able to go back to  "your usual activities right away.  Follow-up care is a key part of your treatment and safety. Be sure to make and go to all appointments, and call your doctor if you are having problems. It's also a good idea to keep a list of the medicines you take. Ask your doctor when you can expect to have your test results.  Where can you learn more?  Go to https://www.[employer]/patiented  Enter Y671 in the search box to learn more about \"Second-Trimester Fetal Ultrasound: About This Test.\"  Current as of: July 10, 2023               Content Version: 14.0    6138-4407 [employer].   Care instructions adapted under license by your healthcare professional. If you have questions about a medical condition or this instruction, always ask your healthcare professional. [employer] disclaims any warranty or liability for your use of this information.      During Pregnancy: Exercises  Introduction  Here are some examples of exercises to do during your pregnancy. Start each exercise slowly. Ease off the exercise if you start to have pain.  Talk to your doctor about when you can start these exercises and which ones will work best for you.  How to do the exercises  Neck rotation    Sit up straight in a firm chair, or stand up straight. If you're standing, keep your feet about hip-width apart.  Keeping your chin level, turn your head to the right and hold for 15 to 30 seconds.  Turn your head to the left and hold for 15 to 30 seconds.  Repeat 2 to 4 times.  Neck stretch to the front    Sit up straight in a firm chair, or stand up straight. Look straight ahead. If you're standing, keep your feet about hip-width apart.  Slowly bend your head forward without moving your shoulders.  Hold for 15 to 30 seconds, then return to your starting position.  Repeat 2 to 4 times.  Back press    Stand with your back 10 to 12 inches away from a wall.  Lean into the wall until your back is against it. Press your lower back " against the wall by pulling in your stomach muscles.  Slowly slide down until your knees are slightly bent, pressing your lower back against the wall.  Hold for at least 6 seconds, then slide back up the wall.  Repeat 8 to 12 times.  Over time, work up to holding this position for as much as 1 minute.  Trunk twist    Sit on the floor with your legs crossed. If that's not comfortable, you can sit on a folded blanket so your bottom is a few inches off the floor. Or you can sit on a chair with your knees hip-width apart and your feet flat on the floor.  Reach your left hand toward your right knee. You can place your right hand at your side for support.  Slowly twist your body (trunk) to your right.  Relax and return to your starting position.  Repeat 2 to 4 times.  Switch your hands and twist to your left.  Repeat 2 to 4 times.  Pelvic rocking on hands and knees    Start on your hands and knees. Place your wrists directly below your shoulders and your knees below your hips.  Breathe in slowly. Tuck your head downward and round your back up, making a curve with your back in the shape of the letter C. Hold this position for about 6 seconds.  Breathe out slowly and bring your head back up. Relax, keeping your back straight. (Don't allow it to curve toward the floor.) Hold for about 6 seconds.  Repeat 8 to 12 times, gently rocking your pelvis.  Pelvic tilt    Lie on your back with your knees bent and your feet flat on the floor.  Tighten your belly muscles by pulling your belly button in toward your spine. Press your lower back to the floor. You should feel your hips and pelvis rock back.  Hold for 6 seconds while breathing smoothly, and then relax.  Repeat 8 to 12 times.  Do this exercise only during the first 4 months of pregnancy. After this point, lying on your back is not recommended, because it can cause blood flow problems for you and your baby.  Backward stretch    Start on your hands and knees with your knees 8 to  10 inches apart, hands directly below your shoulders, and arms and back straight.  Keeping your arms straight, slowly lower your buttocks toward your heels and tuck your head toward your knees. Hold for 15 to 30 seconds.  Slowly return to the starting position.  Repeat 2 to 4 times.  Forward bend    Sit comfortably in a chair, with your arms relaxed.  Slowly bend forward, allowing your arms to hang down. Lean only as far as you can without feeling discomfort or pressure on your belly.  Hold for 15 to 30 seconds and then slowly sit up straight.  Repeat 2 to 4 times or to your comfort level.  Donkey kick    Start on your hands and knees. Place your hands directly below your shoulders, and keep your arms straight.  Tighten your belly muscles by pulling your belly button in toward your spine. Keep breathing normally, and don't hold your breath.  Lift one knee and bring it toward your elbow.  Slowly extend that leg behind you without completely straightening it. Be careful not to let your hip drop down. Avoid arching your back.  Hold your leg behind you for about 6 seconds.  Return to your starting position.  Repeat 8 to 12 times for each leg.  Tailor sitting    Sit on the floor.  Bring your feet close to your body while crossing your ankles.  Keep your back straight. Relax your legs and let your knees drop toward the floor.  Hold this position for as long as you are comfortable.  Toe reach    Sit on the floor with your back straight, legs about 12 inches apart, and feet relaxed outward.  Stretch your hands forward toward your right foot, then sit up.  Stretch your hands straight forward, then sit up.  Stretch your hands forward toward your left foot, then sit up.  Hold each stretch for 15 to 30 seconds.  Repeat 2 to 4 times.  Follow-up care is a key part of your treatment and safety. Be sure to make and go to all appointments, and call your doctor if you are having problems. It's also a good idea to know your test  results and keep a list of the medicines you take.  Current as of: July 10, 2023               Content Version: 14.0    5753-6784 [employer].   Care instructions adapted under license by your healthcare professional. If you have questions about a medical condition or this instruction, always ask your healthcare professional. Healthwise, [employer] disclaims any warranty or liability for your use of this information.    [employer]org for info on birth control.

## 2024-07-19 NOTE — PROGRESS NOTES
"SUBJECTIVE:  Brenda Bacon is a 29 year old yo  at 15w2d weeks with an Estimated Date of Delivery: 2025.    She denies HA, changes in vision, CP, SOB, N/V, abd px/cramping, VB, LOF, LE swelling.   Fetal movement is not noted yet.  - Currently 15 weeks pregnant  - Checking blood sugar levels twice a week, with normal results  - History of low blood pressure and low blood sugar  - No fetal movement felt yet  - Considering options for birth control post-delivery, prefers non-hormonal methods, discussed various options and provided [email or web address] website to review  - Plans to breastfeed  - No other concerns or symptoms currently  - Lives approximately 45 minutes away from the hospital  - Not planning a trial of labor for this pregnancy  - Questions regarding safety of laboring before delivery and whether the uterine scare needs to be evaluated.     Past medical history  - History of gestational diabetes  - History of low blood pressure and low blood sugar    Past obstetric history  - Previous pregnancy resulted in a  delivery due to breech presentation  - Previous GDMA1    Lab results  A1C: 5.1 (Normal)      OBJECTIVE:  /66   Pulse 97   Temp 98.2  F (36.8  C) (Temporal)   Resp 16   Ht 1.695 m (5' 6.73\")   Wt 65.1 kg (143 lb 9.6 oz)   LMP 2024 (Approximate)   SpO2 99%   BMI 22.67 kg/m     Gen - well appearing  See Ob vitals for exam    Prenatal Labs:   Lab Results   Component Value Date    AS Negative 2024    HEPBANG Nonreactive 2024    CHPCRT Negative 2024    GCPCRT Negative 2024    HGB 13.9 2024      ASSESSMENT/PLAN:  (Z34.80) Encounter for supervision of normal pregnancy in multigravida  (primary encounter diagnosis)  Plan: US OB > 14 weeks, COMPLETE, Single (Fetal         Survey) ([id number])  (Z34.91) Prenatal care in second trimester  (Z98.891) History of   - Patient is currently pregnant and has a history of gestational diabetes and "  delivery. She is considering a trial of labor but is planning for a  due to previous experience and provider preference.  - Continue prenatal care. Discussed previously options for labor and delivery, including the possibility of a trial of labor versus a planned .  - Reviewed discussion today as patient had questions, answered questions as able.  - Risks: Discussed the aspects of risk of uterine rupture due to previous  scar.    (Z86.32) History of gestational diabetes  - Patient has a history of gestational diabetes, controlled by diet. Her A1C is currently normal.  - Monitor for signs of gestational diabetes. Discuss timing of gestational diabetes screening with primary provider.  - Risks: Discussed the risk of recurrence of gestational diabetes in this pregnancy.    Doing well.  No concerns today.  Prenatal flowsheet information is reviewed.  Reportable signs and symptoms discussed.  Ordering anatomy US today. Will forward results to Primary OB once available. Anticipate collection around 20 weeks.   RTC 1 mo    Pregnancy Issues   1. History of CS:                -Low transverse  section secondary to breech presentation. Opting for planned RLTCS as she desires to deliver at [facility] which is no longer offering TOLAC    2. Hx of GDMA1: Standard prenatal diabetes screening planned. A1c wnl, 5.1, in first trimester.      Prenatal care plan              - prior deliveries: PLTCS for breech presentation in first pregnancy  - OB Labs of concern: none   - Rh- A POS              - Ped:  Dr. Mendoza at [place]              - Circumcision if boy: Yes              - Birth control: Undecided, nothing hormonal.               - Breast feeding:  Yes              - Covid 19 vaccine:  Not Done              - influenza vaccine: Due September              - Tdap Recommend at 28 weeks   - RSV May qualify for this in third trimester if desires.     A total of 20 minutes were  spent on this visit on the day of the encounter in addition to the standard prenatal items for: chart review, history, assessment, exam, results review, documentation and discussing the assessment and plan as above with the patient.

## 2024-08-23 ENCOUNTER — PRENATAL OFFICE VISIT (OUTPATIENT)
Dept: FAMILY MEDICINE | Facility: CLINIC | Age: 29
End: 2024-08-23
Payer: COMMERCIAL

## 2024-08-23 ENCOUNTER — HOSPITAL ENCOUNTER (OUTPATIENT)
Dept: ULTRASOUND IMAGING | Facility: CLINIC | Age: 29
Discharge: HOME | End: 2024-08-23
Attending: STUDENT IN AN ORGANIZED HEALTH CARE EDUCATION/TRAINING PROGRAM | Admitting: STUDENT IN AN ORGANIZED HEALTH CARE EDUCATION/TRAINING PROGRAM
Payer: COMMERCIAL

## 2024-08-23 VITALS
DIASTOLIC BLOOD PRESSURE: 64 MMHG | RESPIRATION RATE: 16 BRPM | OXYGEN SATURATION: 100 % | SYSTOLIC BLOOD PRESSURE: 102 MMHG | HEART RATE: 84 BPM | BODY MASS INDEX: 23.95 KG/M2 | WEIGHT: 149 LBS | TEMPERATURE: 98.2 F | HEIGHT: 66 IN

## 2024-08-23 DIAGNOSIS — Z98.891 HISTORY OF C-SECTION: ICD-10-CM

## 2024-08-23 DIAGNOSIS — Z34.92 PRENATAL CARE IN SECOND TRIMESTER: Primary | ICD-10-CM

## 2024-08-23 DIAGNOSIS — Z34.80 ENCOUNTER FOR SUPERVISION OF NORMAL PREGNANCY IN MULTIGRAVIDA: ICD-10-CM

## 2024-08-23 PROCEDURE — 76805 OB US >/= 14 WKS SNGL FETUS: CPT

## 2024-08-23 PROCEDURE — 99207 PR PRENATAL VISIT: CPT | Performed by: FAMILY MEDICINE

## 2024-08-23 ASSESSMENT — PAIN SCALES - GENERAL: PAINLEVEL: NO PAIN (0)

## 2024-08-23 NOTE — PROGRESS NOTES
"Pregnancy Issues  1. History of CS:                -Low transverse  section secondary to breech presentation. Opting for planned RLTCS as she desires to deliver at [facility] which is no longer offering TOLAC                          2. Hx of GDMA1: Standard prenatal diabetes screening planned. A1c wnl, 5.1, in first trimester.        OB history:  .  Last pregnancy was complicated with breech presentation and she had a low transverse primary  section for it.  No complication with the surgery or postpartum care.  She also had diet-controlled gestational diabetes.  No history of HTN, gHTN, preeclampsia, or group B strep       Prenatal care plan              - REGINALD:  25 (US)  - OB Labs A+, IR, all others were normal   - First trimester screening:  Declined   - Second trimester screening:  Declined (NIPS/AFP or Quad)   - Pain management:  repeat CS   - Ped:  Dr. Mendoza at [place] [facility]   - Circumcision if boy: Yes              - Birth control: Undecided, nothing hormonal.               - Breast feeding:  Yes              - Covid 19 vaccine:  Not Done              - influenza vaccine: Due September              - Tdap Recommend at 28 weeks              - RSV May qualify for this in third trimester if desires.       Doing well with no concern today  No smoking, drug or alcohol.    No UTI symptoms.    No nausea/vomiting or heartburn; been drinking a lot of water  No vaginal bleeding, cramping, leakage or abnormal discharge.  No headache, acute visual changes, leg swelling, abdominal pain, CP/SOB.    No safety or mental health concerns      /64   Pulse 84   Temp 98.2  F (36.8  C) (Temporal)   Resp 16   Ht 1.664 m (5' 5.5\")   Wt 67.6 kg (149 lb)   LMP 2024 (Approximate)   SpO2 100%   BMI 24.42 kg/m       Discussed  screening.  Declined NIPS/AFP or Quad  Discussed about kick counts and fetal movement.  Discussed about what to expect in the next four weeks.   Continue " with Prenatal vitamin   Encourage to drink a lot of water.  Exercising, stay active and healthy diet discussed and recommended  Ultrasound for anatomy survey today - normal.    Reportable signs and symptoms discussed.  F/U in 4 weeks, earlier as needed.      Petra Majano Mai, MD

## 2024-08-23 NOTE — PATIENT INSTRUCTIONS

## 2024-09-20 ENCOUNTER — PRENATAL OFFICE VISIT (OUTPATIENT)
Dept: FAMILY MEDICINE | Facility: CLINIC | Age: 29
End: 2024-09-20
Payer: COMMERCIAL

## 2024-09-20 VITALS
SYSTOLIC BLOOD PRESSURE: 103 MMHG | DIASTOLIC BLOOD PRESSURE: 68 MMHG | BODY MASS INDEX: 25.47 KG/M2 | HEART RATE: 89 BPM | TEMPERATURE: 97.2 F | RESPIRATION RATE: 19 BRPM | OXYGEN SATURATION: 97 % | WEIGHT: 155.4 LBS

## 2024-09-20 DIAGNOSIS — Z86.32 HISTORY OF GESTATIONAL DIABETES: ICD-10-CM

## 2024-09-20 DIAGNOSIS — Z34.92 PRENATAL CARE IN SECOND TRIMESTER: Primary | ICD-10-CM

## 2024-09-20 PROCEDURE — 99207 PR PRENATAL VISIT: CPT | Performed by: FAMILY MEDICINE

## 2024-09-20 ASSESSMENT — PAIN SCALES - GENERAL: PAINLEVEL: NO PAIN (0)

## 2024-09-20 NOTE — PROGRESS NOTES
Pregnancy Issues  1. History of CS:                -Low transverse CS - breech presentation.   - Planned RLTCS as she desires to deliver at [facility]; no longer offering TOLAC                2. Hx of GDMA1: Standard prenatal diabetes screening planned. A1c wnl, 5.1, in first trimester.         OB history:  .  Breech presentation; had CS with no complication.  Also had diet-controlled GDM.  No history of HTN, gHTN, preeclampsia, or group B strep         Prenatal care plan              - REGINALD:  25 ()  - OB Labs A+, IR, all others were normal              - First trimester screening:  Declined              - Second trimester screening:  Declined (NIPS/AFP or Quad)              - Pain management:  repeat CS              - Ped:  Dr. Mendoza at [place]              - Circumcision if boy: Yes              - Birth control: Undecided, nothing hormonal.               - Breast feeding:  Yes              - Covid 19 vaccine:  Declined              - influenza vaccine: Declined               - Tdap Recommend at 28 weeks              - RSV:  offer at 35 weeks.     Doing well and has no concern.    Normal fetal movement with no contraction.    No UTI symptoms   Good appetite and tolerate PO intake well.   -No nausea/vomiting. No heartburn.  No vaginal bleeding, abnormal discharge, cramping, or leakage.  No headache, vision changes, leg swelling, abdominal pain, CP/SOB  No mental health or safety concern.     Reviewed the prenatal flow sheet with her    /68   Pulse 89   Temp 97.2  F (36.2  C) (Temporal)   Resp 19   Wt 70.5 kg (155 lb 6.4 oz)   LMP 2024 (Approximate)   SpO2 97%   BMI 25.47 kg/m       Discussed kick counts and fetal movement.  Reportable symptoms discussed.  Continue with prenatal vitamin daily   Encouraged to drink a lot of water.    Healthy diet and exercising discussed and encouraged  Educated about what to expect in the next 4 weeks.    Follow up in 4 week, earlier as needed.     Ordered 1 hour glucose test, Hgb and RPR to be done at the next visit.    Offered and recommended flu vaccination but she declined  All of her questions answered.      Petra Majano Mai, MD

## 2024-10-18 ENCOUNTER — LAB (OUTPATIENT)
Dept: LAB | Facility: CLINIC | Age: 29
End: 2024-10-18
Payer: COMMERCIAL

## 2024-10-18 ENCOUNTER — PRENATAL OFFICE VISIT (OUTPATIENT)
Dept: FAMILY MEDICINE | Facility: CLINIC | Age: 29
End: 2024-10-18
Payer: COMMERCIAL

## 2024-10-18 VITALS
HEART RATE: 90 BPM | RESPIRATION RATE: 16 BRPM | SYSTOLIC BLOOD PRESSURE: 115 MMHG | HEIGHT: 66 IN | OXYGEN SATURATION: 98 % | TEMPERATURE: 97.3 F | BODY MASS INDEX: 26.13 KG/M2 | DIASTOLIC BLOOD PRESSURE: 75 MMHG | WEIGHT: 162.6 LBS

## 2024-10-18 DIAGNOSIS — Z34.92 PRENATAL CARE IN SECOND TRIMESTER: ICD-10-CM

## 2024-10-18 DIAGNOSIS — O99.810 ABNORMAL MATERNAL GLUCOSE TOLERANCE, ANTEPARTUM: ICD-10-CM

## 2024-10-18 DIAGNOSIS — Z86.32 HISTORY OF GESTATIONAL DIABETES: ICD-10-CM

## 2024-10-18 DIAGNOSIS — Z34.93 PRENATAL CARE IN THIRD TRIMESTER: Primary | ICD-10-CM

## 2024-10-18 DIAGNOSIS — Z23 NEED FOR PROPHYLACTIC VACCINATION AND INOCULATION AGAINST INFLUENZA: ICD-10-CM

## 2024-10-18 LAB
ERYTHROCYTE [DISTWIDTH] IN BLOOD BY AUTOMATED COUNT: 13.1 % (ref 10–15)
GLUCOSE 1H P 50 G GLC PO SERPL-MCNC: 154 MG/DL (ref 70–129)
HCT VFR BLD AUTO: 34.6 % (ref 35–47)
HGB BLD-MCNC: 11.9 G/DL (ref 11.7–15.7)
MCH RBC QN AUTO: 31.5 PG (ref 26.5–33)
MCHC RBC AUTO-ENTMCNC: 34.4 G/DL (ref 31.5–36.5)
MCV RBC AUTO: 92 FL (ref 78–100)
PLATELET # BLD AUTO: 266 10E3/UL (ref 150–450)
RBC # BLD AUTO: 3.78 10E6/UL (ref 3.8–5.2)
T PALLIDUM AB SER QL: NONREACTIVE
WBC # BLD AUTO: 12.2 10E3/UL (ref 4–11)

## 2024-10-18 PROCEDURE — 36415 COLL VENOUS BLD VENIPUNCTURE: CPT

## 2024-10-18 PROCEDURE — 90471 IMMUNIZATION ADMIN: CPT | Performed by: FAMILY MEDICINE

## 2024-10-18 PROCEDURE — 90715 TDAP VACCINE 7 YRS/> IM: CPT | Performed by: FAMILY MEDICINE

## 2024-10-18 PROCEDURE — 90472 IMMUNIZATION ADMIN EACH ADD: CPT | Performed by: FAMILY MEDICINE

## 2024-10-18 PROCEDURE — 90656 IIV3 VACC NO PRSV 0.5 ML IM: CPT | Performed by: FAMILY MEDICINE

## 2024-10-18 PROCEDURE — 99207 PR PRENATAL VISIT: CPT | Performed by: FAMILY MEDICINE

## 2024-10-18 PROCEDURE — 86780 TREPONEMA PALLIDUM: CPT

## 2024-10-18 PROCEDURE — 85027 COMPLETE CBC AUTOMATED: CPT

## 2024-10-18 PROCEDURE — 82950 GLUCOSE TEST: CPT

## 2024-10-18 ASSESSMENT — PAIN SCALES - GENERAL: PAINLEVEL: NO PAIN (0)

## 2024-10-18 NOTE — PROGRESS NOTES
"Pregnancy Issues  1. History of CS:                -Low transverse CS - breech presentation.   - Planned RLTCS as she desires to deliver at [facility]; no longer offering TOLAC                2. Hx of GDMA1: Standard prenatal diabetes screening planned. A1c wnl, 5.1, in first trimester.  - Abnormal 1 hr glucose today - pending for 3 hr glucose         OB history:  .  Breech presentation; had CS with no complication.  Also had diet-controlled GDM.  No history of HTN, gHTN, preeclampsia, or group B strep         Prenatal care plan              - REGINALD:  25 ()  - OB Labs A+, IR, all others were normal              - First trimester screening:  Declined              - Second trimester screening:  Declined (NIPS/AFP or Quad)              - Pain management:  repeat CS              - Ped:  Dr. Mendoza at [place]              - Circumcision if boy: Yes              - Birth control: Undecided, nothing hormonal. Possible vasectomy.    - Discussed about Salpingectomy with CS              - Breast feeding:  Yes              - Covid 19 vaccine:  Declined              - Tdap & influenza vaccine: Given 10/18/24              - RSV: offer at 35 weeks.     Doing well and no concerns today.  No vaginal bleeding, loss of fluid, cramping or abnormal discharge.  No UTI symptoms.  Good appetite and tolerates PO intake well.   -No nausea or vomiting. No heartburn.  No headache, visual changes, abdominal pain, chest pain, leg swelling or SOB.  No mental health or safety concern.  Normal fetal movement and with no contractions.    /75   Pulse 90   Temp 97.3  F (36.3  C) (Temporal)   Resp 16   Ht 1.664 m (5' 5.5\")   Wt 73.8 kg (162 lb 9.6 oz)   LMP 2024 (Approximate)   SpO2 98%   BMI 26.65 kg/m       1 hour GTT done today - high: pending for 3 hr glucose  Tdap and Influenza vaccines given today. Offered Covid, but declined.   - Side effects discussed  CBC and syphilis labs done today - normal  PTL " precaution, PPROM  and reportable symptoms discussed.   Continue prenatal vitamin daily.  Continue to hydrate adequately.  Healthy diet and exercise discussed and encouraged.  Discussed what to expect over the next 4 weeks.  RTC in 4 weeks, sooner if needed.  All of her questions answered.    Solitario Velazquez, MS3  Medical Student      I was present with the medical student who participated in the service and in the documentation of the note. I have verified the history and personally performed the physical exam and medical decision making. I reviewed the note in detail and edited it appropriately. I agree with the assessment and plan of care as documented in the note.     Petra Majano Mai, MD

## 2024-10-18 NOTE — PATIENT INSTRUCTIONS
Weeks 26 to 30 of Your Pregnancy: Care Instructions  You're starting your last trimester. You'll probably feel your baby moving around more. Your back may ache as your body gets used to your baby's size and length. Take care of yourself, and pay attention to what your body needs.    Talk to your doctor about getting the Tdap shot. It will help protect your  against whooping cough (pertussis). Also ask your doctor about flu and COVID-19 shots if you haven't had them yet. If your blood type is Rh negative, you may be given a shot of Rh immune globulin (such as RhoGAM). It can help prevent problems for your baby.   You may have [place] contractions. They are single or several strong contractions without a pattern. These are practice contractions but not the start of labor.   Be kind to yourself.       Take breaks when you're tired.  Change positions often. Don't sit for too long or stand for too long.  At work, rest during breaks if you can. If you don't get breaks, talk to your doctor about writing a letter to your employer to request them.  Avoid fumes, chemicals, and tobacco smoke.  Be sexual if you want to.       You may be interested in sex, or you may not. Everyone is different.  Sex is okay unless your doctor tells you not to.  Your belly can make it hard to find good positions for sex. [place] and explore.  Watch for signs of  labor.        These signs include:  Menstrual-like cramps. Or you may have pain or pressure in your pelvis that happens in a pattern.  About 6 or more contractions in an hour (even after rest and a glass of water).  A low, dull backache that doesn't go away when you change positions.  An increase or change in vaginal discharge.  Light vaginal bleeding or spotting.  Your water breaking.  Know what to do if you think you are having contractions.       Drink 1 or 2 glasses of water.  Lie down on your left side for at least an hour.  While on your side, feel the top of  "your belly to see if it's tight.  Write down your contractions for an hour. Time how long it is from the start of one contraction to the start of the next.  Call your doctor if you have regular contractions.  Follow-up care is a key part of your treatment and safety. Be sure to make and go to all appointments, and call your doctor if you are having problems. It's also a good idea to know your test results and keep a list of the medicines you take.  Where can you learn more?  Go to https://www.myContactCard.net/patiented  Enter S999 in the search box to learn more about \"Weeks 26 to 30 of Your Pregnancy: Care Instructions.\"  Current as of: July 10, 2023  Content Version: 14.2 2024 [employer].   Care instructions adapted under license by your healthcare professional. If you have questions about a medical condition or this instruction, always ask your healthcare professional. [employer] disclaims any warranty or liability for your use of this information.    Counting Your Baby's Kicks: Care Instructions  Overview     Counting your baby's kicks is one way your doctor can tell that your baby is healthy. You will probably feel your baby move for the first time between 16 and 22 weeks. The movement may feel like flutters rather than kicks. Your baby may move more at certain times of the day. When you are active, you may notice less kicking than when you are resting. At your prenatal visits, your doctor will ask whether the baby is active.  In your last trimester, your doctor may ask you to count the number of times you feel your baby move.  Follow-up care is a key part of your treatment and safety. Be sure to make and go to all appointments, and call your doctor if you are having problems. It's also a good idea to know your test results and keep a list of the medicines you take.  How do you count fetal kicks?  A common method of checking your baby's movement is to note the length of time it takes " "to count 10 movements (such as kicks, flutters, or rolls).  Pick your baby's most active time of day to count. This may be any time from morning to evening.  If you don't feel 10 movements in an hour, have something to eat or drink and count for another hour. If you don't feel at least 10 movements in the 2-hour period, call your doctor.  Do not use an at-home Doppler heart monitor in place of counting fetal movements.  When should you call for help?   Call your doctor now or seek immediate medical care if:    You feel fewer than 10 movements in a 2-hour period.     You noticed that your baby has stopped moving or is moving less than normal.   Watch closely for changes in your health, and be sure to contact your doctor if you have any problems.  Where can you learn more?  Go to https://www.Constant Contact.net/patiented  Enter U048 in the search box to learn more about \"Counting Your Baby's Kicks: Care Instructions.\"  Current as of: July 10, 2023  Content Version: 14.2 2024 [facility] [employer].   Care instructions adapted under license by your healthcare professional. If you have questions about a medical condition or this instruction, always ask your healthcare professional. [employer] disclaims any warranty or liability for your use of this information.      "

## 2024-11-15 ENCOUNTER — PRENATAL OFFICE VISIT (OUTPATIENT)
Dept: FAMILY MEDICINE | Facility: CLINIC | Age: 29
End: 2024-11-15
Payer: COMMERCIAL

## 2024-11-15 VITALS
TEMPERATURE: 97.6 F | OXYGEN SATURATION: 100 % | WEIGHT: 164.4 LBS | HEART RATE: 98 BPM | RESPIRATION RATE: 14 BRPM | SYSTOLIC BLOOD PRESSURE: 112 MMHG | DIASTOLIC BLOOD PRESSURE: 64 MMHG | BODY MASS INDEX: 26.94 KG/M2

## 2024-11-15 DIAGNOSIS — Z34.93 PRENATAL CARE IN THIRD TRIMESTER: Primary | ICD-10-CM

## 2024-11-15 DIAGNOSIS — O24.410 DIET CONTROLLED GESTATIONAL DIABETES MELLITUS (GDM) IN THIRD TRIMESTER: ICD-10-CM

## 2024-11-15 DIAGNOSIS — Z86.32 HISTORY OF GESTATIONAL DIABETES: ICD-10-CM

## 2024-11-15 DIAGNOSIS — Z98.891 HISTORY OF C-SECTION: ICD-10-CM

## 2024-11-15 PROCEDURE — 99207 PR PRENATAL VISIT: CPT | Performed by: FAMILY MEDICINE

## 2024-11-15 NOTE — PROGRESS NOTES
Pregnancy Issues  1. History of CS:                -Low transverse CS - breech presentation.   - Planned RLTCS as she desires to deliver at [facility]; no longer offering TOLAC      -TOLAC request form send in 11/15/24             2. Hx of GDMA1: First trimester hemoglobin A1c was 5.1.  - Abnormal 1 hr glucose - declined  the 3 hr glucose test  -Failure- encouraged to monitor the blood sugar closely  -Fasting blood sugar: <95  -1 hour postprandial blood sugar:  <140  -2 hours postprandial blood sugar:  <120  -Exercise and healthy diet discussed and encouraged  -Discouraged skip meals or calorie deficit diet        OB history:  .  Breech presentation; had CS with no complication.  Also had diet-controlled GDM.  No history of HTN, gHTN, preeclampsia, or group B strep         Prenatal care plan              - REGINALD:  25 ()  - OB Labs A+, IR, all others were normal              - First trimester screening:  Declined              - Second trimester screening:  Declined (NIPS/AFP or Quad)              - Pain management:  repeat CS  (epidural if TOLACS)              - Ped:  Dr. Mendoza at [place]              - Circumcision if boy: Yes              - Birth control: Undecided, nothing hormonal. Possible vasectomy.    - Discussed about ParaGard IUD                          - Discussed about Salpingectomy with CS              - Breast feeding:  Yes              - Covid 19 vaccine:  Declined              - Tdap & influenza vaccine: Given 10/18/24              - RSV: Declined.       Blood sugar has been normal.  Fasting blood sugar has been running about 80-90.  1 hour and 2 hours postprandial blood sugar has been below the goal range as well.   - Encouraged to continue monitoring her blood sugar   -She was educated about to call in or to be seen   -Will hold off on diabetic educator for now      Doing well and has no concern.    Normal fetal movement with no contraction or cramping.    No headache, dizziness,  acute visual change or leg swelling.    No abnormal discharge, vaginal bleeding or leakage.    No UTI symptoms.   No concern of mental health or safety concern  Minimal heartburn, tolerate oral intake well  Been drinking a lot of water         Reviewed the prenatal flow sheet with her in details    /64   Pulse 98   Temp 97.6  F (36.4  C) (Temporal)   Resp 14   Wt 74.6 kg (164 lb 6.4 oz)   LMP 04/08/2024 (Approximate)   SpO2 100%   BMI 26.94 kg/m      Continue with prenatal vitamin   Encouraged drinks a lot of water.    Exercise and healthy diet discussed as above  PTL precautions, PROM and reportable symptoms discussed.   Preeclamptic symptoms discussed.  Discussed kick counts and fetal movement.  UTD Tdap & flu; declined RSV today  Educated about what to expect in the next couple weeks  RTC 2 weeks - all of her questions were answered.        Petra Majano Mai, MD

## 2024-11-16 PROBLEM — O24.419 GDM (GESTATIONAL DIABETES MELLITUS): Status: ACTIVE | Noted: 2024-11-16

## 2024-12-03 ENCOUNTER — PRENATAL OFFICE VISIT (OUTPATIENT)
Dept: FAMILY MEDICINE | Facility: CLINIC | Age: 29
End: 2024-12-03
Payer: COMMERCIAL

## 2024-12-03 VITALS
WEIGHT: 166 LBS | SYSTOLIC BLOOD PRESSURE: 110 MMHG | TEMPERATURE: 97.4 F | HEIGHT: 66 IN | OXYGEN SATURATION: 96 % | BODY MASS INDEX: 26.68 KG/M2 | HEART RATE: 108 BPM | DIASTOLIC BLOOD PRESSURE: 70 MMHG

## 2024-12-03 DIAGNOSIS — O24.410 DIET CONTROLLED GESTATIONAL DIABETES MELLITUS (GDM) IN THIRD TRIMESTER: ICD-10-CM

## 2024-12-03 DIAGNOSIS — Z98.891 HISTORY OF C-SECTION: ICD-10-CM

## 2024-12-03 DIAGNOSIS — Z34.93 PRENATAL CARE IN THIRD TRIMESTER: Primary | ICD-10-CM

## 2024-12-03 PROCEDURE — 99207 PR PRENATAL VISIT: CPT | Performed by: FAMILY MEDICINE

## 2024-12-03 PROCEDURE — 90471 IMMUNIZATION ADMIN: CPT | Performed by: FAMILY MEDICINE

## 2024-12-03 PROCEDURE — 90678 RSV VACC PREF BIVALENT IM: CPT | Performed by: FAMILY MEDICINE

## 2024-12-03 RX ORDER — OXYTOCIN/0.9 % SODIUM CHLORIDE 30/500 ML
340 PLASTIC BAG, INJECTION (ML) INTRAVENOUS CONTINUOUS PRN
OUTPATIENT
Start: 2024-12-03

## 2024-12-03 RX ORDER — LIDOCAINE 40 MG/G
CREAM TOPICAL
OUTPATIENT
Start: 2024-12-03

## 2024-12-03 RX ORDER — CARBOPROST TROMETHAMINE 250 UG/ML
250 INJECTION, SOLUTION INTRAMUSCULAR
OUTPATIENT
Start: 2024-12-03

## 2024-12-03 RX ORDER — ACETAMINOPHEN 325 MG/1
975 TABLET ORAL ONCE
OUTPATIENT
Start: 2024-12-03 | End: 2024-12-03

## 2024-12-03 RX ORDER — CEFAZOLIN SODIUM/WATER 2 G/20 ML
2 SYRINGE (ML) INTRAVENOUS
OUTPATIENT
Start: 2024-12-03

## 2024-12-03 RX ORDER — METHYLERGONOVINE MALEATE 0.2 MG/ML
200 INJECTION INTRAVENOUS
OUTPATIENT
Start: 2024-12-03

## 2024-12-03 RX ORDER — OXYTOCIN 10 [USP'U]/ML
10 INJECTION, SOLUTION INTRAMUSCULAR; INTRAVENOUS
OUTPATIENT
Start: 2024-12-03

## 2024-12-03 RX ORDER — LOPERAMIDE HYDROCHLORIDE 2 MG/1
2 CAPSULE ORAL
OUTPATIENT
Start: 2024-12-03

## 2024-12-03 RX ORDER — OXYTOCIN/0.9 % SODIUM CHLORIDE 30/500 ML
100-340 PLASTIC BAG, INJECTION (ML) INTRAVENOUS CONTINUOUS PRN
OUTPATIENT
Start: 2024-12-03

## 2024-12-03 RX ORDER — SODIUM CHLORIDE, SODIUM LACTATE, POTASSIUM CHLORIDE, CALCIUM CHLORIDE 600; 310; 30; 20 MG/100ML; MG/100ML; MG/100ML; MG/100ML
INJECTION, SOLUTION INTRAVENOUS CONTINUOUS
OUTPATIENT
Start: 2024-12-03

## 2024-12-03 RX ORDER — CITRIC ACID/SODIUM CITRATE 334-500MG
30 SOLUTION, ORAL ORAL
OUTPATIENT
Start: 2024-12-03

## 2024-12-03 RX ORDER — TRANEXAMIC ACID 10 MG/ML
1 INJECTION, SOLUTION INTRAVENOUS EVERY 30 MIN PRN
OUTPATIENT
Start: 2024-12-03

## 2024-12-03 RX ORDER — LOPERAMIDE HYDROCHLORIDE 2 MG/1
4 CAPSULE ORAL
OUTPATIENT
Start: 2024-12-03

## 2024-12-03 RX ORDER — MISOPROSTOL 200 UG/1
400 TABLET ORAL
OUTPATIENT
Start: 2024-12-03

## 2024-12-03 RX ORDER — CEFAZOLIN SODIUM/WATER 2 G/20 ML
2 SYRINGE (ML) INTRAVENOUS SEE ADMIN INSTRUCTIONS
OUTPATIENT
Start: 2024-12-03

## 2024-12-03 ASSESSMENT — PAIN SCALES - GENERAL: PAINLEVEL_OUTOF10: NO PAIN (0)

## 2024-12-03 NOTE — PROGRESS NOTES
Pregnancy Issues  1. History of CS:                -Low transverse CS - breech presentation.   - Planned RLTCS as she desires to deliver at [facility]; no longer offering TOLAC      -TOLAC request form send in 11/15/24 - not heard back yet     -Prefer repeat CS now - requested for 1/3/25        2. Hx of GDMA1: First trimester hemoglobin A1c was 5.1.  - Abnormal 1 hr glucose - declined  the 3 hr glucose test  - Encouraged to monitor the blood sugar closely  -Fasting blood sugar: <95  -1 hour postprandial blood sugar:  <140  -2 hours postprandial blood sugar:  <120  -Exercise and healthy diet discussed and encouraged  -Discouraged skip meals or calorie deficit diet        OB history:  .  Breech presentation; had CS with no complication.  Also had diet-controlled GDM.  No history of HTN, gHTN, preeclampsia, or group B strep         Prenatal care plan              - REGINALD:  25 ()  - OB Labs A+, IR, all others were normal              - First trimester screening:  Declined              - Second trimester screening:  Declined (NIPS/AFP or Quad)              - Pain management:  repeat CS  (epidural if TOLACS)              - Ped:  Dr. Mendoza at [facility]              - Circumcision if boy: Yes              - Birth control: Natural family planning. Possible vasectomy in the future.                          - Discussed about ParaGard IUD                          - Discussed about Salpingectomy with CS; not ready yet              - Breast feeding:  Yes              - Covid 19 vaccine:  Declined              - Tdap & influenza vaccine: Given 10/18/24              - RSV: 12/3/24    Blood sugar has been normal.  Fasting blood sugar has been running about 80-90.  1 hour and 2 hours postprandial blood sugar has been below the goal range as well.              -Encouraged to continue monitoring her blood sugar BID              -She was educated about when to call in or to be seen              -Been stable and normal  "- hold off on DM educator    Doing well with no concerns today.  Normal fetal movement with no contractions or cramping.  No headaches, vision changes, RUQ pain or leg swelling.   No abnormal vaginal discharge, leakage of fluid or vaginal bleeding  No UTI symptoms  No mental health or safety concerns   PO intake has been good with minimal heartburn  Request RSV shots today    /70   Pulse 108   Temp 97.4  F (36.3  C) (Tympanic)   Ht 1.664 m (5' 5.5\")   Wt 75.3 kg (166 lb)   LMP 04/08/2024 (Approximate)   SpO2 96%   BMI 27.20 kg/m      Continue to monitor blood sugars - in am and once after meal, alternate each day  Continue prenatal vitamin  Encouraged to continue drinking a lot of water  Continue activity as tolerated  Preeclampsia symptoms discussed.  PTL, PROM, and reportable symptoms discussed.  Discussed kick counts and fetal movement.  UTD on Flu and Tdap. Received RSV today. Declined Covid.  Follow-up as scheduled: prenatal care, GBS and preop  May cancel 1/2/24 apt    Solitario Velazquez, MS3  Medical Student      I was present with the medical student who participated in the service and in the documentation of the note. I have verified the history and personally performed the physical exam and medical decision making. I reviewed the note in detail and edited it appropriately. I agree with the assessment and plan of care as documented in the note.      Petra Majano Mai, MD      "

## 2024-12-12 ENCOUNTER — HOSPITAL ENCOUNTER (OUTPATIENT)
Facility: CLINIC | Age: 29
Discharge: HOME | End: 2024-12-12
Attending: FAMILY MEDICINE | Admitting: FAMILY MEDICINE
Payer: COMMERCIAL

## 2024-12-12 VITALS
OXYGEN SATURATION: 98 % | TEMPERATURE: 98.6 F | DIASTOLIC BLOOD PRESSURE: 85 MMHG | SYSTOLIC BLOOD PRESSURE: 126 MMHG | HEART RATE: 118 BPM | RESPIRATION RATE: 16 BRPM

## 2024-12-12 DIAGNOSIS — Z34.93 PRENATAL CARE IN THIRD TRIMESTER: Primary | ICD-10-CM

## 2024-12-12 DIAGNOSIS — O24.410 DIET CONTROLLED GESTATIONAL DIABETES MELLITUS (GDM) IN THIRD TRIMESTER: ICD-10-CM

## 2024-12-12 PROBLEM — Z36.89 ENCOUNTER FOR TRIAGE IN PREGNANT PATIENT: Status: ACTIVE | Noted: 2024-12-12

## 2024-12-12 PROCEDURE — G0463 HOSPITAL OUTPT CLINIC VISIT: HCPCS

## 2024-12-12 PROCEDURE — 99213 OFFICE O/P EST LOW 20 MIN: CPT | Performed by: FAMILY MEDICINE

## 2024-12-12 ASSESSMENT — ACTIVITIES OF DAILY LIVING (ADL): ADLS_ACUITY_SCORE: 44

## 2024-12-12 NOTE — PROGRESS NOTES
S: Triage Discharge.  A: Cervix closed, -3. Pt discussed with Dr. Ornelas. Feels comfortable going home.  R: Will return to [facility] if labor progresses.

## 2024-12-12 NOTE — PROGRESS NOTES
S: Triage Arrival  B: Brenda moraes a @ 36W 2D gestation who presents for abd cramping that started around midnight. She got up had BM; went back to sleep. Up a short while later, had another BM. Tightness and cramping ever since. Brenda will be a repeat c/s and lives an hour away, so came in to make sure she wasn't in labor.  A: EFM / EUM applied. FHT's 150's with mod Variability, accels present. Decels not present noted on strip. Contractions every 3-5 minutes, palpating mild .   R: Will notify MD for instruction.

## 2024-12-12 NOTE — PROGRESS NOTES
"  2024    Brenda Bacon  [id number]            OB triage note      Ms. Bacon  is here with somewhat regular contractions.    She has noticed some tightening and pain in her lower abdomen starting last night about midnight  At first, she felt like she needed to defecate, but she has stooled a few times without any change in her symptoms.  These were normal formed stools.  She denies any dysuria or vaginal discharge.  She does have a history of a prior .      Patient's last menstrual period was 2024 (approximate).   Her Estimated Date of Delivery: 2025  , making her 36w2d  wks.      Estimated body mass index is 27.2 kg/m  as calculated from the following:    Height as of 12/3/24: 1.664 m (5' 5.5\").    Weight as of 12/3/24: 75.3 kg (166 lb).         She is a 29 year old   Her OB history:   OB History    Para Term  AB Living   2 1 1 0 0 1   SAB IAB Ectopic Multiple Live Births   0 0 0 0 1      # Outcome Date GA Lbr Tan/2nd Weight Sex Type Anes PTL Lv   2 Current            1 Term 23 38w3d  3.01 kg (6 lb 10.2 oz) M CS-LTranv Spinal N SUHAIL      Name: Micha      Apgar1: 9  Apgar5: 9      Obstetric Comments   EDC 2025 based on LMP.   to René            Past Medical History:   Diagnosis Date    Cat-scratch disease 2004    Lyme disease     also cat scratch disease          Past Surgical History:   Procedure Laterality Date     SECTION N/A 2023    Procedure:  SECTION;  Surgeon: Kendrick Delacruz MD;  [facility]    EXCISE GANGLION WRIST  2013    Procedure: EXCISE GANGLION WRIST;  excision left wrist dorsal cyst;  Surgeon: Karina Webb MD;  Location: [facility] OR    EXCISE GANGLION WRIST Right 2014    Procedure: EXCISE GANGLION WRIST;  Surgeon: Danis Hodge MD;  Location: [facility] OR         No current outpatient medications on file.       Allergies: No known allergies      REVIEW OF " SYSTEMS:  NEUROLOGIC:  Negative  EYES:  Negative  ENT:  Negative  GI:  Negative  BREAST:  Negative  :  Negative  GYN:  Negative  CV:  Negative  PULMONARY:  Negative  MUSCULOSKELETAL:  Negative  PSYCH:  Negative        Social History     Socioeconomic History    Marital status:      Spouse name: Elayne    Number of children: Not on file    Years of education: Not on file    Highest education level: Not on file   Occupational History    Not on file   Tobacco Use    Smoking status: Never    Smokeless tobacco: Never    Tobacco comments:     NO NICOTINE EXPOSURE @ HOME   Vaping Use    Vaping status: Never Used   Substance and Sexual Activity    Alcohol use: Not Currently     Comment: social-quit with pregnancy    Drug use: No    Sexual activity: Yes     Partners: Male   Other Topics Concern    Parent/sibling w/ CABG, MI or angioplasty before 65F 55M? Not Asked   Social History Narrative    5/2024  Lives in [place] with her , René and son, Micha.  They have an indoor cat.  Aware of toxoplasmosis precautions.  No concerns about domestic violence.  Brenda is an      Social Drivers of Health     Financial Resource Strain: Low Risk  (12/12/2024)    Financial Resource Strain     Within the past 12 months, have you or your family members you live with been unable to get utilities (heat, electricity) when it was really needed?: No   Food Insecurity: Low Risk  (12/12/2024)    Food Insecurity     Within the past 12 months, did you worry that your food would run out before you got money to buy more?: No     Within the past 12 months, did the food you bought just not last and you didn t have money to get more?: No   Transportation Needs: Low Risk  (12/12/2024)    Transportation Needs     Within the past 12 months, has lack of transportation kept you from medical appointments, getting your medicines, non-medical meetings or appointments, work, or from getting things that you need?: No   Physical  Activity: Not on file   Stress: Not on file   Social Connections: Not on file   Interpersonal Safety: Low Risk  (2024)    Interpersonal Safety     Do you feel physically and emotionally safe where you currently live?: Yes     Within the past 12 months, have you been hit, slapped, kicked or otherwise physically hurt by someone?: No     Within the past 12 months, have you been humiliated or emotionally abused in other ways by your partner or ex-partner?: No   Housing Stability: Low Risk  (2024)    Housing Stability     Do you have housing? : Yes     Are you worried about losing your housing?: No      Family History   Problem Relation Age of Onset    Hip dysplasia Mother     Hypertension Father     Hyperlipidemia Father     No Known Problems Sister     No Known Problems Maternal Grandmother     Asthma Paternal Grandmother     Chronic Obstructive Pulmonary Disease Paternal Grandmother     Diabetes Paternal Grandfather     Heart Disease Paternal Grandfather          of MI at 71    Hip dysplasia Son         left             Vitals:   , reactive with contractions every 3-6 min    Alert Awake in NAD  HEENT grossly normal  Neck: no lymphadenopathy or thryoidomegaly  Lungs clear to auscultation bilaterally  Back no significant spinal or CVAT  Heart regular rate and rhythm with no murmurs appreciated  ABD gravid, mild abdominal tenderness on exam with fundus palpable  Pelvic: No fluid noted, no blood noted by RN exam  Cervix is 0 cm by RN exam  EXT: No significant edema or calf tenderness  Neuro: Grossly intact    Assessment:  IUP at 36w2d with likely prodromal labor.  Discussed options with patient including monitoring versus discharging to home with return if her contractions worsen in severity.    Plan: Patient wished to discharge home since her cervix is closed.  She will monitor and return if contractions become more severe.  I am hesitant to head straight to the OR for  unless we are  certain that she is laboring.      Portions of this note were completed using Dragon dictation software.  Although reviewed, there may be typographical and other inadvertent errors that remain.       [unfilled]      Jonnathan Ornelas MD, MD [facility]  Dept of OB/GYN  December 12, 2024

## 2024-12-17 ENCOUNTER — HOSPITAL ENCOUNTER (OUTPATIENT)
Dept: ULTRASOUND IMAGING | Facility: CLINIC | Age: 29
Discharge: HOME | End: 2024-12-17
Attending: NURSE PRACTITIONER
Payer: COMMERCIAL

## 2024-12-17 DIAGNOSIS — O24.410 DIET CONTROLLED GESTATIONAL DIABETES MELLITUS (GDM) IN THIRD TRIMESTER: ICD-10-CM

## 2024-12-17 PROCEDURE — 76816 OB US FOLLOW-UP PER FETUS: CPT

## 2024-12-20 PROBLEM — Z36.89 ENCOUNTER FOR TRIAGE IN PREGNANT PATIENT: Status: RESOLVED | Noted: 2024-12-12 | Resolved: 2024-12-20

## 2024-12-23 ENCOUNTER — TELEPHONE (OUTPATIENT)
Dept: FAMILY MEDICINE | Facility: CLINIC | Age: 29
End: 2024-12-23
Payer: COMMERCIAL

## 2024-12-23 NOTE — TELEPHONE ENCOUNTER
Forms/Letter Request    Type of form/letter: Disability    Do we have the form/letter: Yes:     Who is the form from? Patient    Where did/will the form come from? form was faxed in    When is form/letter needed by: asap    How would you like the form/letter returned: Fax : [phone]    Patient Notified form requests are processed in 5-7 business days:Yes    Could we send this information to you in [place] [facility] or would you prefer to receive a phone call?:   Patient would prefer a phone call   Okay to leave a detailed message?: Yes at Cell number on file:    Telephone Information:   Mobile [phone]

## 2024-12-31 ENCOUNTER — HOSPITAL ENCOUNTER (OUTPATIENT)
Facility: CLINIC | Age: 29
Discharge: HOME | End: 2024-12-31
Attending: FAMILY MEDICINE | Admitting: FAMILY MEDICINE
Payer: COMMERCIAL

## 2024-12-31 VITALS
SYSTOLIC BLOOD PRESSURE: 125 MMHG | DIASTOLIC BLOOD PRESSURE: 86 MMHG | OXYGEN SATURATION: 98 % | TEMPERATURE: 98.5 F | RESPIRATION RATE: 18 BRPM

## 2024-12-31 DIAGNOSIS — O24.410 DIET CONTROLLED GESTATIONAL DIABETES MELLITUS (GDM) IN THIRD TRIMESTER: ICD-10-CM

## 2024-12-31 DIAGNOSIS — A08.4 VIRAL GASTROENTERITIS: ICD-10-CM

## 2024-12-31 DIAGNOSIS — Z34.93 PRENATAL CARE IN THIRD TRIMESTER: Primary | ICD-10-CM

## 2024-12-31 PROBLEM — Z36.89 ENCOUNTER FOR TRIAGE IN PREGNANT PATIENT: Status: ACTIVE | Noted: 2024-12-31

## 2024-12-31 LAB
ANION GAP SERPL CALCULATED.3IONS-SCNC: 14 MMOL/L (ref 7–15)
BUN SERPL-MCNC: 11.4 MG/DL (ref 6–20)
CALCIUM SERPL-MCNC: 8.3 MG/DL (ref 8.8–10.4)
CHLORIDE SERPL-SCNC: 102 MMOL/L (ref 98–107)
CREAT SERPL-MCNC: 0.51 MG/DL (ref 0.51–0.95)
EGFRCR SERPLBLD CKD-EPI 2021: >90 ML/MIN/1.73M2
GLUCOSE SERPL-MCNC: 166 MG/DL (ref 70–99)
HCO3 SERPL-SCNC: 21 MMOL/L (ref 22–29)
HOLD SPECIMEN: NORMAL
POTASSIUM SERPL-SCNC: 3.9 MMOL/L (ref 3.4–5.3)
SODIUM SERPL-SCNC: 137 MMOL/L (ref 135–145)

## 2024-12-31 PROCEDURE — 80048 BASIC METABOLIC PNL TOTAL CA: CPT | Performed by: FAMILY MEDICINE

## 2024-12-31 PROCEDURE — 250N000011 HC RX IP 250 OP 636: Performed by: FAMILY MEDICINE

## 2024-12-31 PROCEDURE — 96374 THER/PROPH/DIAG INJ IV PUSH: CPT

## 2024-12-31 PROCEDURE — G0463 HOSPITAL OUTPT CLINIC VISIT: HCPCS | Mod: 25

## 2024-12-31 PROCEDURE — 96376 TX/PRO/DX INJ SAME DRUG ADON: CPT

## 2024-12-31 PROCEDURE — 96361 HYDRATE IV INFUSION ADD-ON: CPT

## 2024-12-31 PROCEDURE — 96360 HYDRATION IV INFUSION INIT: CPT

## 2024-12-31 PROCEDURE — 96375 TX/PRO/DX INJ NEW DRUG ADDON: CPT

## 2024-12-31 PROCEDURE — 82374 ASSAY BLOOD CARBON DIOXIDE: CPT | Performed by: FAMILY MEDICINE

## 2024-12-31 RX ORDER — DEXTROSE, SODIUM CHLORIDE, AND POTASSIUM CHLORIDE 5; .45; .15 G/100ML; G/100ML; G/100ML
1000 INJECTION INTRAVENOUS ONCE
Status: COMPLETED | OUTPATIENT
Start: 2024-12-31 | End: 2024-12-31

## 2024-12-31 RX ORDER — ONDANSETRON 2 MG/ML
4 INJECTION INTRAMUSCULAR; INTRAVENOUS EVERY 6 HOURS PRN
Status: DISCONTINUED | OUTPATIENT
Start: 2024-12-31 | End: 2024-12-31

## 2024-12-31 RX ORDER — METOCLOPRAMIDE HYDROCHLORIDE 5 MG/ML
5 INJECTION INTRAMUSCULAR; INTRAVENOUS EVERY 6 HOURS PRN
Status: DISCONTINUED | OUTPATIENT
Start: 2024-12-31 | End: 2024-12-31 | Stop reason: HOSPADM

## 2024-12-31 RX ORDER — PROMETHAZINE HYDROCHLORIDE 25 MG/1
25 TABLET ORAL EVERY 6 HOURS PRN
Qty: 60 TABLET | Refills: 1 | Status: SHIPPED | OUTPATIENT
Start: 2024-12-31

## 2024-12-31 RX ORDER — ONDANSETRON 4 MG/1
4 TABLET, ORALLY DISINTEGRATING ORAL EVERY 8 HOURS PRN
Qty: 60 TABLET | Refills: 1 | Status: SHIPPED | OUTPATIENT
Start: 2024-12-31

## 2024-12-31 RX ORDER — LIDOCAINE 40 MG/G
CREAM TOPICAL
Status: DISCONTINUED | OUTPATIENT
Start: 2024-12-31 | End: 2024-12-31 | Stop reason: HOSPADM

## 2024-12-31 RX ORDER — ONDANSETRON 2 MG/ML
4 INJECTION INTRAMUSCULAR; INTRAVENOUS EVERY 6 HOURS PRN
Status: DISCONTINUED | OUTPATIENT
Start: 2024-12-31 | End: 2024-12-31 | Stop reason: HOSPADM

## 2024-12-31 RX ADMIN — POTASSIUM CHLORIDE, DEXTROSE MONOHYDRATE AND SODIUM CHLORIDE 1000 ML: 150; 5; 450 INJECTION, SOLUTION INTRAVENOUS at 05:32

## 2024-12-31 RX ADMIN — METOCLOPRAMIDE 5 MG: 5 INJECTION, SOLUTION INTRAMUSCULAR; INTRAVENOUS at 06:51

## 2024-12-31 RX ADMIN — ONDANSETRON 4 MG: 2 INJECTION, SOLUTION INTRAMUSCULAR; INTRAVENOUS at 09:08

## 2024-12-31 RX ADMIN — ONDANSETRON 4 MG: 2 INJECTION, SOLUTION INTRAMUSCULAR; INTRAVENOUS at 05:15

## 2024-12-31 ASSESSMENT — ACTIVITIES OF DAILY LIVING (ADL)
ADLS_ACUITY_SCORE: 21

## 2024-12-31 NOTE — DISCHARGE SUMMARY
[facility] Labor and Delivery Progress Note    Brenda Bacon MRN# [id number]   Age: 29 year old YOB: 1995           Subjective:   Patient is a 29-year-old G2, P1 currently at 39 weeks and 0 days gestational age who presented with nausea and vomiting as well as GI disturbance causing uterine contractions.  She denies any loss of fluid.  Contractions started early this morning.  She reports they have lessened in frequency now that she has had some IV fluids           Objective:   Patient Vitals for the past 24 hrs:   BP Temp Temp src Resp SpO2 Oximeter Heart Rate   24 0418 125/86 98.5  F (36.9  C) Oral 18 98 % 118 bpm         Cervical Exam: 0 / 30-40% / -2      Position: Posterior    Membranes:       Fetal Heart Rate:    Monitor: external US    Variability: moderate (amplitude range 6 to 25 bpm)    Baseline Rate: normal range    Fetal Heart Rate Tracin          Assessment:   Brenda Bacon is a 29 year old  who is 39w0d history of  x 1 here with uterine contractions related to irritability secondary to dehydration from GI disturbance and likely viral gastroenteritis          Plan:   Patient was observed in labor and delivery for several hours.  IV fluids were given as well as Zofran and Reglan.  Patient is feeling significantly better at this juncture.  Will do a trial of p.o. intake and if able to tolerate clears will discharge home with Zofran and promethazine.  Plan to follow-up on Friday and 3 days for planned repeat  section.  Observation: No cervical dilation over the last 3 hours.  Contractions have lessened significantly      Yanira Norman MD

## 2024-12-31 NOTE — PROGRESS NOTES
Zofran given at 0515. D5 0.45NS with 20mEq Potassium being ran at 500 mL/hr due to hard stop of 10mEq of potassium in one hour. Pharmacy verified this.

## 2024-12-31 NOTE — PROGRESS NOTES
Data: Patient presented to [facility]: 2024  4:06 AM.  Reason for maternal/fetal assessment is uterine contractions. Patient reports ctx being 5 min apart lasting 60 seconds. Pain 5/10. Vomiting and diarrhea started at 2300 last night. Patient denies leaking of vaginal fluid/rupture of membranes, vaginal bleeding, pelvic pressure, headache, visual disturbances, epigastric or RUQ pain, significant edema. Patient reports fetal movement is normal. Patient is a 39w0d .  Prenatal record reviewed. Pregnancy has been complicated by diabetes.    Vital signs wnl. Support person is present.     Action: Verbal consent for EFM. Triage assessment completed.     Response: Patient verbalized agreement with plan. Dr. Vences updated with patient status. MD gave VORB for 1L D5 0.45NS with 20meq potassium, Zofran, Reglan and BMP lab.

## 2024-12-31 NOTE — PROGRESS NOTES
S: Discharge from triage  A: FHT's 145, variability moderate, accels present, decels not present, contractions infrequent and felt as mild by pt. Nausea is decreased with IV antiemetics. No emesis since. Tolerated apple juice. Patient agreeable to discharge.  R: Written and verbal discharge instructions provided. Pt. Verbalized understanding of discharge instructions and will follow up with provider as previously scheduled. Verbalizes understanding that she will call or return to the [facility] with any further questions or concerns. Patient D/C'd to home @ 9444      Nursing Discharge Checklist  Discharge order entered: Yes Where is the patient located?  Patient care order entered: Yes Where is the patient located?  Charges entered: Yes Where is the patient located?  IV start and stop times have been documented in Epic: Yes   NST start and stop times have been documented in Epic: Not Applicable

## 2024-12-31 NOTE — PROGRESS NOTES
Emerson at bedside to assess patient.Patient feeling better with nausea meds and IV fluids. Emerson would like to make sure she can tolerate oral intake and then is okay with pt discharging. Pt is agreeable to plan, with oral antiemetics. Emerson will place order for zofran and reglan PO. Cervix remains unchanged. Plan to discharge in the next 30-60 mins if tolerating intake.

## 2025-01-01 ENCOUNTER — ANESTHESIA EVENT (OUTPATIENT)
Dept: OBGYN | Facility: CLINIC | Age: 30
End: 2025-01-01
Payer: COMMERCIAL

## 2025-01-03 ENCOUNTER — ORDERS ONLY (AUTO-RELEASED) (OUTPATIENT)
Dept: OBGYN | Facility: CLINIC | Age: 30
End: 2025-01-03

## 2025-01-03 ENCOUNTER — HOSPITAL ENCOUNTER (INPATIENT)
Facility: CLINIC | Age: 30
LOS: 2 days | Discharge: HOME | End: 2025-01-05
Attending: FAMILY MEDICINE | Admitting: FAMILY MEDICINE
Payer: COMMERCIAL

## 2025-01-03 ENCOUNTER — APPOINTMENT (OUTPATIENT)
Dept: CARDIOLOGY | Facility: CLINIC | Age: 30
End: 2025-01-03
Attending: FAMILY MEDICINE
Payer: COMMERCIAL

## 2025-01-03 ENCOUNTER — ANESTHESIA (OUTPATIENT)
Dept: OBGYN | Facility: CLINIC | Age: 30
End: 2025-01-03
Payer: COMMERCIAL

## 2025-01-03 DIAGNOSIS — O24.410 DIET CONTROLLED GESTATIONAL DIABETES MELLITUS (GDM) IN THIRD TRIMESTER: Primary | ICD-10-CM

## 2025-01-03 DIAGNOSIS — I44.7 BUNDLE BRANCH BLOCK, LEFT: ICD-10-CM

## 2025-01-03 DIAGNOSIS — Z98.891 HISTORY OF C-SECTION: ICD-10-CM

## 2025-01-03 PROBLEM — Z86.32 HISTORY OF GESTATIONAL DIABETES: Status: RESOLVED | Noted: 2022-12-20 | Resolved: 2025-01-03

## 2025-01-03 PROBLEM — Z34.90 PRENATAL CARE: Status: ACTIVE | Noted: 2024-06-21

## 2025-01-03 LAB
ABO + RH BLD: NORMAL
ANION GAP SERPL CALCULATED.3IONS-SCNC: 13 MMOL/L (ref 7–15)
ATRIAL RATE - MUSE: 59 BPM
ATRIAL RATE - MUSE: 66 BPM
BLD GP AB SCN SERPL QL: NEGATIVE
BUN SERPL-MCNC: 7.1 MG/DL (ref 6–20)
CALCIUM SERPL-MCNC: 8.6 MG/DL (ref 8.8–10.4)
CHLORIDE SERPL-SCNC: 102 MMOL/L (ref 98–107)
CREAT SERPL-MCNC: 0.57 MG/DL (ref 0.51–0.95)
DIASTOLIC BLOOD PRESSURE - MUSE: NORMAL MMHG
DIASTOLIC BLOOD PRESSURE - MUSE: NORMAL MMHG
EGFRCR SERPLBLD CKD-EPI 2021: >90 ML/MIN/1.73M2
ERYTHROCYTE [DISTWIDTH] IN BLOOD BY AUTOMATED COUNT: 13.1 % (ref 10–15)
GLUCOSE SERPL-MCNC: 135 MG/DL (ref 70–99)
GLUCOSE SERPL-MCNC: 93 MG/DL (ref 70–99)
HCO3 SERPL-SCNC: 21 MMOL/L (ref 22–29)
HCT VFR BLD AUTO: 36.1 % (ref 35–47)
HGB BLD-MCNC: 12.6 G/DL (ref 11.7–15.7)
INTERPRETATION ECG - MUSE: NORMAL
INTERPRETATION ECG - MUSE: NORMAL
LVEF ECHO: NORMAL
MAGNESIUM SERPL-MCNC: 1.5 MG/DL (ref 1.7–2.3)
MCH RBC QN AUTO: 31.6 PG (ref 26.5–33)
MCHC RBC AUTO-ENTMCNC: 34.9 G/DL (ref 31.5–36.5)
MCV RBC AUTO: 91 FL (ref 78–100)
P AXIS - MUSE: 45 DEGREES
P AXIS - MUSE: 55 DEGREES
PLATELET # BLD AUTO: 246 10E3/UL (ref 150–450)
POTASSIUM SERPL-SCNC: 4.1 MMOL/L (ref 3.4–5.3)
PR INTERVAL - MUSE: 130 MS
PR INTERVAL - MUSE: 158 MS
QRS DURATION - MUSE: 154 MS
QRS DURATION - MUSE: 86 MS
QT - MUSE: 418 MS
QT - MUSE: 462 MS
QTC - MUSE: 413 MS
QTC - MUSE: 484 MS
R AXIS - MUSE: 54 DEGREES
R AXIS - MUSE: 59 DEGREES
RBC # BLD AUTO: 3.99 10E6/UL (ref 3.8–5.2)
SODIUM SERPL-SCNC: 136 MMOL/L (ref 135–145)
SPECIMEN EXP DATE BLD: NORMAL
SYSTOLIC BLOOD PRESSURE - MUSE: NORMAL MMHG
SYSTOLIC BLOOD PRESSURE - MUSE: NORMAL MMHG
T AXIS - MUSE: 198 DEGREES
T AXIS - MUSE: 41 DEGREES
T PALLIDUM AB SER QL: NONREACTIVE
TROPONIN T SERPL HS-MCNC: <6 NG/L
TROPONIN T SERPL HS-MCNC: <6 NG/L
TSH SERPL DL<=0.005 MIU/L-ACNC: 0.94 UIU/ML (ref 0.3–4.2)
VENTRICULAR RATE- MUSE: 59 BPM
VENTRICULAR RATE- MUSE: 66 BPM
WBC # BLD AUTO: 8.3 10E3/UL (ref 4–11)

## 2025-01-03 PROCEDURE — 99233 SBSQ HOSP IP/OBS HIGH 50: CPT | Performed by: STUDENT IN AN ORGANIZED HEALTH CARE EDUCATION/TRAINING PROGRAM

## 2025-01-03 PROCEDURE — 258N000003 HC RX IP 258 OP 636: Performed by: NURSE ANESTHETIST, CERTIFIED REGISTERED

## 2025-01-03 PROCEDURE — 360N000076 HC SURGERY LEVEL 3, PER MIN: Performed by: FAMILY MEDICINE

## 2025-01-03 PROCEDURE — 93010 ELECTROCARDIOGRAM REPORT: CPT | Performed by: INTERNAL MEDICINE

## 2025-01-03 PROCEDURE — 710N000010 HC RECOVERY PHASE 1, LEVEL 2, PER MIN: Performed by: FAMILY MEDICINE

## 2025-01-03 PROCEDURE — 93306 TTE W/DOPPLER COMPLETE: CPT | Mod: 26 | Performed by: INTERNAL MEDICINE

## 2025-01-03 PROCEDURE — 250N000009 HC RX 250: Performed by: FAMILY MEDICINE

## 2025-01-03 PROCEDURE — 36415 COLL VENOUS BLD VENIPUNCTURE: CPT | Performed by: FAMILY MEDICINE

## 2025-01-03 PROCEDURE — 85018 HEMOGLOBIN: CPT | Performed by: FAMILY MEDICINE

## 2025-01-03 PROCEDURE — 86900 BLOOD TYPING SEROLOGIC ABO: CPT | Performed by: FAMILY MEDICINE

## 2025-01-03 PROCEDURE — 83735 ASSAY OF MAGNESIUM: CPT | Performed by: FAMILY MEDICINE

## 2025-01-03 PROCEDURE — 84484 ASSAY OF TROPONIN QUANT: CPT | Performed by: FAMILY MEDICINE

## 2025-01-03 PROCEDURE — 250N000013 HC RX MED GY IP 250 OP 250 PS 637: Performed by: FAMILY MEDICINE

## 2025-01-03 PROCEDURE — 82565 ASSAY OF CREATININE: CPT | Performed by: FAMILY MEDICINE

## 2025-01-03 PROCEDURE — 258N000003 HC RX IP 258 OP 636: Performed by: FAMILY MEDICINE

## 2025-01-03 PROCEDURE — 250N000011 HC RX IP 250 OP 636: Performed by: NURSE ANESTHETIST, CERTIFIED REGISTERED

## 2025-01-03 PROCEDURE — 370N000017 HC ANESTHESIA TECHNICAL FEE, PER MIN: Performed by: FAMILY MEDICINE

## 2025-01-03 PROCEDURE — 84443 ASSAY THYROID STIM HORMONE: CPT | Performed by: FAMILY MEDICINE

## 2025-01-03 PROCEDURE — 82310 ASSAY OF CALCIUM: CPT | Performed by: FAMILY MEDICINE

## 2025-01-03 PROCEDURE — 250N000013 HC RX MED GY IP 250 OP 250 PS 637: Performed by: NURSE ANESTHETIST, CERTIFIED REGISTERED

## 2025-01-03 PROCEDURE — 86780 TREPONEMA PALLIDUM: CPT | Performed by: FAMILY MEDICINE

## 2025-01-03 PROCEDURE — 85041 AUTOMATED RBC COUNT: CPT | Performed by: FAMILY MEDICINE

## 2025-01-03 PROCEDURE — 120N000013 HC R&B IMCU

## 2025-01-03 PROCEDURE — 59510 CESAREAN DELIVERY: CPT | Performed by: FAMILY MEDICINE

## 2025-01-03 PROCEDURE — 250N000011 HC RX IP 250 OP 636: Performed by: FAMILY MEDICINE

## 2025-01-03 PROCEDURE — 93306 TTE W/DOPPLER COMPLETE: CPT

## 2025-01-03 PROCEDURE — 80048 BASIC METABOLIC PNL TOTAL CA: CPT | Performed by: OBSTETRICS & GYNECOLOGY

## 2025-01-03 PROCEDURE — 271N000001 HC OR GENERAL SUPPLY NON-STERILE: Performed by: FAMILY MEDICINE

## 2025-01-03 PROCEDURE — 93005 ELECTROCARDIOGRAM TRACING: CPT

## 2025-01-03 PROCEDURE — 272N000001 HC OR GENERAL SUPPLY STERILE: Performed by: FAMILY MEDICINE

## 2025-01-03 RX ORDER — METOCLOPRAMIDE HYDROCHLORIDE 5 MG/ML
10 INJECTION INTRAMUSCULAR; INTRAVENOUS EVERY 6 HOURS PRN
Status: DISCONTINUED | OUTPATIENT
Start: 2025-01-03 | End: 2025-01-05 | Stop reason: HOSPADM

## 2025-01-03 RX ORDER — BUPIVACAINE HYDROCHLORIDE 7.5 MG/ML
INJECTION, SOLUTION INTRASPINAL PRN
Status: DISCONTINUED | OUTPATIENT
Start: 2025-01-03 | End: 2025-01-03

## 2025-01-03 RX ORDER — LIDOCAINE 40 MG/G
CREAM TOPICAL
Status: DISCONTINUED | OUTPATIENT
Start: 2025-01-03 | End: 2025-01-03 | Stop reason: HOSPADM

## 2025-01-03 RX ORDER — KETOROLAC TROMETHAMINE 30 MG/ML
INJECTION, SOLUTION INTRAMUSCULAR; INTRAVENOUS PRN
Status: DISCONTINUED | OUTPATIENT
Start: 2025-01-03 | End: 2025-01-03

## 2025-01-03 RX ORDER — OXYTOCIN/0.9 % SODIUM CHLORIDE 30/500 ML
340 PLASTIC BAG, INJECTION (ML) INTRAVENOUS CONTINUOUS PRN
Status: DISCONTINUED | OUTPATIENT
Start: 2025-01-03 | End: 2025-01-03 | Stop reason: HOSPADM

## 2025-01-03 RX ORDER — DIMENHYDRINATE 50 MG/ML
12.5 INJECTION, SOLUTION INTRAMUSCULAR; INTRAVENOUS EVERY 10 MIN PRN
Status: DISCONTINUED | OUTPATIENT
Start: 2025-01-03 | End: 2025-01-05 | Stop reason: HOSPADM

## 2025-01-03 RX ORDER — FENTANYL CITRATE 50 UG/ML
50 INJECTION, SOLUTION INTRAMUSCULAR; INTRAVENOUS EVERY 5 MIN PRN
Status: DISCONTINUED | OUTPATIENT
Start: 2025-01-03 | End: 2025-01-03

## 2025-01-03 RX ORDER — CARBOPROST TROMETHAMINE 250 UG/ML
250 INJECTION, SOLUTION INTRAMUSCULAR
Status: DISCONTINUED | OUTPATIENT
Start: 2025-01-03 | End: 2025-01-05 | Stop reason: HOSPADM

## 2025-01-03 RX ORDER — MEPERIDINE HYDROCHLORIDE 25 MG/ML
12.5 INJECTION INTRAMUSCULAR; INTRAVENOUS; SUBCUTANEOUS EVERY 5 MIN PRN
Status: DISCONTINUED | OUTPATIENT
Start: 2025-01-03 | End: 2025-01-03

## 2025-01-03 RX ORDER — DEXTROSE, SODIUM CHLORIDE, SODIUM LACTATE, POTASSIUM CHLORIDE, AND CALCIUM CHLORIDE 5; .6; .31; .03; .02 G/100ML; G/100ML; G/100ML; G/100ML; G/100ML
INJECTION, SOLUTION INTRAVENOUS CONTINUOUS
Status: DISCONTINUED | OUTPATIENT
Start: 2025-01-03 | End: 2025-01-05 | Stop reason: HOSPADM

## 2025-01-03 RX ORDER — IBUPROFEN 800 MG/1
800 TABLET, FILM COATED ORAL EVERY 6 HOURS
Status: DISCONTINUED | OUTPATIENT
Start: 2025-01-04 | End: 2025-01-05 | Stop reason: HOSPADM

## 2025-01-03 RX ORDER — LOPERAMIDE HYDROCHLORIDE 2 MG/1
2 CAPSULE ORAL
Status: DISCONTINUED | OUTPATIENT
Start: 2025-01-03 | End: 2025-01-05 | Stop reason: HOSPADM

## 2025-01-03 RX ORDER — ACETAMINOPHEN 325 MG/1
975 TABLET ORAL ONCE
Status: DISCONTINUED | OUTPATIENT
Start: 2025-01-03 | End: 2025-01-03

## 2025-01-03 RX ORDER — OXYTOCIN/0.9 % SODIUM CHLORIDE 30/500 ML
340 PLASTIC BAG, INJECTION (ML) INTRAVENOUS CONTINUOUS PRN
Status: DISCONTINUED | OUTPATIENT
Start: 2025-01-03 | End: 2025-01-05 | Stop reason: HOSPADM

## 2025-01-03 RX ORDER — BISACODYL 10 MG
10 SUPPOSITORY, RECTAL RECTAL DAILY PRN
Status: DISCONTINUED | OUTPATIENT
Start: 2025-01-05 | End: 2025-01-05 | Stop reason: HOSPADM

## 2025-01-03 RX ORDER — CEFAZOLIN SODIUM/WATER 2 G/20 ML
2 SYRINGE (ML) INTRAVENOUS SEE ADMIN INSTRUCTIONS
Status: DISCONTINUED | OUTPATIENT
Start: 2025-01-03 | End: 2025-01-03 | Stop reason: HOSPADM

## 2025-01-03 RX ORDER — SIMETHICONE 80 MG
80 TABLET,CHEWABLE ORAL 4 TIMES DAILY PRN
Status: DISCONTINUED | OUTPATIENT
Start: 2025-01-03 | End: 2025-01-05 | Stop reason: HOSPADM

## 2025-01-03 RX ORDER — DEXAMETHASONE SODIUM PHOSPHATE 10 MG/ML
4 INJECTION, SOLUTION INTRAMUSCULAR; INTRAVENOUS
Status: DISCONTINUED | OUTPATIENT
Start: 2025-01-03 | End: 2025-01-05 | Stop reason: HOSPADM

## 2025-01-03 RX ORDER — LIDOCAINE 40 MG/G
CREAM TOPICAL
Status: DISCONTINUED | OUTPATIENT
Start: 2025-01-03 | End: 2025-01-05 | Stop reason: HOSPADM

## 2025-01-03 RX ORDER — ONDANSETRON 2 MG/ML
4 INJECTION INTRAMUSCULAR; INTRAVENOUS EVERY 30 MIN PRN
Status: DISCONTINUED | OUTPATIENT
Start: 2025-01-03 | End: 2025-01-03

## 2025-01-03 RX ORDER — FENTANYL CITRATE 50 UG/ML
INJECTION, SOLUTION INTRAMUSCULAR; INTRAVENOUS PRN
Status: DISCONTINUED | OUTPATIENT
Start: 2025-01-03 | End: 2025-01-03

## 2025-01-03 RX ORDER — METOCLOPRAMIDE 5 MG/1
10 TABLET ORAL EVERY 6 HOURS PRN
Status: DISCONTINUED | OUTPATIENT
Start: 2025-01-03 | End: 2025-01-05 | Stop reason: HOSPADM

## 2025-01-03 RX ORDER — KETOROLAC TROMETHAMINE 30 MG/ML
30 INJECTION, SOLUTION INTRAMUSCULAR; INTRAVENOUS EVERY 6 HOURS
Status: COMPLETED | OUTPATIENT
Start: 2025-01-03 | End: 2025-01-04

## 2025-01-03 RX ORDER — MISOPROSTOL 200 UG/1
400 TABLET ORAL
Status: DISCONTINUED | OUTPATIENT
Start: 2025-01-03 | End: 2025-01-05 | Stop reason: HOSPADM

## 2025-01-03 RX ORDER — PROCHLORPERAZINE MALEATE 5 MG/1
10 TABLET ORAL EVERY 6 HOURS PRN
Status: DISCONTINUED | OUTPATIENT
Start: 2025-01-03 | End: 2025-01-05 | Stop reason: HOSPADM

## 2025-01-03 RX ORDER — OXYTOCIN/0.9 % SODIUM CHLORIDE 30/500 ML
100-340 PLASTIC BAG, INJECTION (ML) INTRAVENOUS CONTINUOUS PRN
Status: DISCONTINUED | OUTPATIENT
Start: 2025-01-03 | End: 2025-01-05 | Stop reason: HOSPADM

## 2025-01-03 RX ORDER — CEFAZOLIN SODIUM/WATER 2 G/20 ML
2 SYRINGE (ML) INTRAVENOUS
Status: COMPLETED | OUTPATIENT
Start: 2025-01-03 | End: 2025-01-03

## 2025-01-03 RX ORDER — LOPERAMIDE HYDROCHLORIDE 2 MG/1
2 CAPSULE ORAL
Status: DISCONTINUED | OUTPATIENT
Start: 2025-01-03 | End: 2025-01-03 | Stop reason: HOSPADM

## 2025-01-03 RX ORDER — NALOXONE HYDROCHLORIDE 0.4 MG/ML
0.1 INJECTION, SOLUTION INTRAMUSCULAR; INTRAVENOUS; SUBCUTANEOUS
Status: DISCONTINUED | OUTPATIENT
Start: 2025-01-03 | End: 2025-01-05 | Stop reason: HOSPADM

## 2025-01-03 RX ORDER — TRANEXAMIC ACID 10 MG/ML
1 INJECTION, SOLUTION INTRAVENOUS EVERY 30 MIN PRN
Status: DISCONTINUED | OUTPATIENT
Start: 2025-01-03 | End: 2025-01-05 | Stop reason: HOSPADM

## 2025-01-03 RX ORDER — SODIUM CHLORIDE, SODIUM LACTATE, POTASSIUM CHLORIDE, CALCIUM CHLORIDE 600; 310; 30; 20 MG/100ML; MG/100ML; MG/100ML; MG/100ML
INJECTION, SOLUTION INTRAVENOUS CONTINUOUS
Status: DISCONTINUED | OUTPATIENT
Start: 2025-01-03 | End: 2025-01-03 | Stop reason: HOSPADM

## 2025-01-03 RX ORDER — SODIUM CHLORIDE, SODIUM LACTATE, POTASSIUM CHLORIDE, CALCIUM CHLORIDE 600; 310; 30; 20 MG/100ML; MG/100ML; MG/100ML; MG/100ML
INJECTION, SOLUTION INTRAVENOUS CONTINUOUS
Status: DISCONTINUED | OUTPATIENT
Start: 2025-01-03 | End: 2025-01-05

## 2025-01-03 RX ORDER — METHYLERGONOVINE MALEATE 0.2 MG/ML
200 INJECTION INTRAVENOUS
Status: DISCONTINUED | OUTPATIENT
Start: 2025-01-03 | End: 2025-01-05 | Stop reason: HOSPADM

## 2025-01-03 RX ORDER — HYDROCORTISONE 25 MG/G
CREAM TOPICAL 3 TIMES DAILY PRN
Status: DISCONTINUED | OUTPATIENT
Start: 2025-01-03 | End: 2025-01-05 | Stop reason: HOSPADM

## 2025-01-03 RX ORDER — LOPERAMIDE HYDROCHLORIDE 2 MG/1
4 CAPSULE ORAL
Status: DISCONTINUED | OUTPATIENT
Start: 2025-01-03 | End: 2025-01-05 | Stop reason: HOSPADM

## 2025-01-03 RX ORDER — OXYCODONE HYDROCHLORIDE 5 MG/1
5 TABLET ORAL EVERY 4 HOURS PRN
Status: DISCONTINUED | OUTPATIENT
Start: 2025-01-03 | End: 2025-01-05 | Stop reason: HOSPADM

## 2025-01-03 RX ORDER — OXYTOCIN 10 [USP'U]/ML
10 INJECTION, SOLUTION INTRAMUSCULAR; INTRAVENOUS
Status: DISCONTINUED | OUTPATIENT
Start: 2025-01-03 | End: 2025-01-05 | Stop reason: HOSPADM

## 2025-01-03 RX ORDER — MISOPROSTOL 200 UG/1
400 TABLET ORAL
Status: DISCONTINUED | OUTPATIENT
Start: 2025-01-03 | End: 2025-01-03 | Stop reason: HOSPADM

## 2025-01-03 RX ORDER — FENTANYL CITRATE 50 UG/ML
25 INJECTION, SOLUTION INTRAMUSCULAR; INTRAVENOUS EVERY 5 MIN PRN
Status: DISCONTINUED | OUTPATIENT
Start: 2025-01-03 | End: 2025-01-03

## 2025-01-03 RX ORDER — BUPIVACAINE HYDROCHLORIDE 2.5 MG/ML
INJECTION, SOLUTION EPIDURAL; INFILTRATION; INTRACAUDAL PRN
Status: DISCONTINUED | OUTPATIENT
Start: 2025-01-03 | End: 2025-01-03

## 2025-01-03 RX ORDER — OXYTOCIN 10 [USP'U]/ML
10 INJECTION, SOLUTION INTRAMUSCULAR; INTRAVENOUS
Status: DISCONTINUED | OUTPATIENT
Start: 2025-01-03 | End: 2025-01-03 | Stop reason: HOSPADM

## 2025-01-03 RX ORDER — METHYLERGONOVINE MALEATE 0.2 MG/ML
200 INJECTION INTRAVENOUS
Status: DISCONTINUED | OUTPATIENT
Start: 2025-01-03 | End: 2025-01-03 | Stop reason: HOSPADM

## 2025-01-03 RX ORDER — MODIFIED LANOLIN
OINTMENT (GRAM) TOPICAL
Status: DISCONTINUED | OUTPATIENT
Start: 2025-01-03 | End: 2025-01-05 | Stop reason: HOSPADM

## 2025-01-03 RX ORDER — ACETAMINOPHEN 325 MG/1
975 TABLET ORAL EVERY 6 HOURS
Status: DISCONTINUED | OUTPATIENT
Start: 2025-01-03 | End: 2025-01-05 | Stop reason: HOSPADM

## 2025-01-03 RX ORDER — SODIUM PHOSPHATE,MONO-DIBASIC 19G-7G/118
1 ENEMA (ML) RECTAL DAILY PRN
Status: DISCONTINUED | OUTPATIENT
Start: 2025-01-05 | End: 2025-01-05 | Stop reason: HOSPADM

## 2025-01-03 RX ORDER — CARBOPROST TROMETHAMINE 250 UG/ML
250 INJECTION, SOLUTION INTRAMUSCULAR
Status: DISCONTINUED | OUTPATIENT
Start: 2025-01-03 | End: 2025-01-03 | Stop reason: HOSPADM

## 2025-01-03 RX ORDER — AMOXICILLIN 250 MG
1 CAPSULE ORAL 2 TIMES DAILY
Status: DISCONTINUED | OUTPATIENT
Start: 2025-01-03 | End: 2025-01-05 | Stop reason: HOSPADM

## 2025-01-03 RX ORDER — OXYTOCIN 10 [USP'U]/ML
INJECTION, SOLUTION INTRAMUSCULAR; INTRAVENOUS PRN
Status: DISCONTINUED | OUTPATIENT
Start: 2025-01-03 | End: 2025-01-05 | Stop reason: HOSPADM

## 2025-01-03 RX ORDER — AMOXICILLIN 250 MG
2 CAPSULE ORAL 2 TIMES DAILY
Status: DISCONTINUED | OUTPATIENT
Start: 2025-01-03 | End: 2025-01-05 | Stop reason: HOSPADM

## 2025-01-03 RX ORDER — ONDANSETRON 4 MG/1
4 TABLET, ORALLY DISINTEGRATING ORAL EVERY 30 MIN PRN
Status: DISCONTINUED | OUTPATIENT
Start: 2025-01-03 | End: 2025-01-03

## 2025-01-03 RX ORDER — ALBUTEROL SULFATE 0.83 MG/ML
2.5 SOLUTION RESPIRATORY (INHALATION) EVERY 4 HOURS PRN
Status: DISCONTINUED | OUTPATIENT
Start: 2025-01-03 | End: 2025-01-05 | Stop reason: HOSPADM

## 2025-01-03 RX ORDER — LOPERAMIDE HYDROCHLORIDE 2 MG/1
4 CAPSULE ORAL
Status: DISCONTINUED | OUTPATIENT
Start: 2025-01-03 | End: 2025-01-03 | Stop reason: HOSPADM

## 2025-01-03 RX ORDER — DEXAMETHASONE SODIUM PHOSPHATE 10 MG/ML
INJECTION, SOLUTION INTRAMUSCULAR; INTRAVENOUS PRN
Status: DISCONTINUED | OUTPATIENT
Start: 2025-01-03 | End: 2025-01-03

## 2025-01-03 RX ORDER — TRANEXAMIC ACID 10 MG/ML
1 INJECTION, SOLUTION INTRAVENOUS EVERY 30 MIN PRN
Status: DISCONTINUED | OUTPATIENT
Start: 2025-01-03 | End: 2025-01-03 | Stop reason: HOSPADM

## 2025-01-03 RX ORDER — ONDANSETRON 2 MG/ML
4 INJECTION INTRAMUSCULAR; INTRAVENOUS EVERY 6 HOURS PRN
Status: DISCONTINUED | OUTPATIENT
Start: 2025-01-03 | End: 2025-01-05 | Stop reason: HOSPADM

## 2025-01-03 RX ORDER — MORPHINE SULFATE 10 MG/ML
INJECTION, SOLUTION INTRAMUSCULAR; INTRAVENOUS PRN
Status: DISCONTINUED | OUTPATIENT
Start: 2025-01-03 | End: 2025-01-03

## 2025-01-03 RX ORDER — ONDANSETRON 4 MG/1
4 TABLET, ORALLY DISINTEGRATING ORAL EVERY 6 HOURS PRN
Status: DISCONTINUED | OUTPATIENT
Start: 2025-01-03 | End: 2025-01-05 | Stop reason: HOSPADM

## 2025-01-03 RX ORDER — PRENATAL VIT/IRON FUM/FOLIC AC 27MG-0.8MG
1 TABLET ORAL DAILY
Status: DISCONTINUED | OUTPATIENT
Start: 2025-01-03 | End: 2025-01-05 | Stop reason: HOSPADM

## 2025-01-03 RX ORDER — ACETAMINOPHEN 325 MG/1
975 TABLET ORAL ONCE
Status: COMPLETED | OUTPATIENT
Start: 2025-01-03 | End: 2025-01-03

## 2025-01-03 RX ORDER — METHYLERGONOVINE MALEATE 0.2 MG/ML
INJECTION INTRAVENOUS PRN
Status: DISCONTINUED | OUTPATIENT
Start: 2025-01-03 | End: 2025-01-03

## 2025-01-03 RX ORDER — CITRIC ACID/SODIUM CITRATE 334-500MG
30 SOLUTION, ORAL ORAL
Status: COMPLETED | OUTPATIENT
Start: 2025-01-03 | End: 2025-01-03

## 2025-01-03 RX ADMIN — Medication 340 ML/HR: at 07:59

## 2025-01-03 RX ADMIN — DEXAMETHASONE SODIUM PHOSPHATE 5 MG: 10 INJECTION, SOLUTION INTRAMUSCULAR; INTRAVENOUS at 08:47

## 2025-01-03 RX ADMIN — SENNOSIDES AND DOCUSATE SODIUM 1 TABLET: 8.6; 5 TABLET ORAL at 21:29

## 2025-01-03 RX ADMIN — BUPIVACAINE HYDROCHLORIDE 10 ML: 2.5 INJECTION, SOLUTION EPIDURAL; INFILTRATION; INTRACAUDAL; PERINEURAL at 08:46

## 2025-01-03 RX ADMIN — SODIUM CITRATE AND CITRIC ACID MONOHYDRATE 30 ML: 500; 334 SOLUTION ORAL at 06:48

## 2025-01-03 RX ADMIN — CEFAZOLIN 2 G: 10 INJECTION, POWDER, FOR SOLUTION INTRAVENOUS at 07:21

## 2025-01-03 RX ADMIN — BUPIVACAINE HYDROCHLORIDE IN DEXTROSE 1.8 ML: 7.5 INJECTION, SOLUTION SUBARACHNOID at 07:31

## 2025-01-03 RX ADMIN — FENTANYL CITRATE 15 MCG: 50 INJECTION INTRAMUSCULAR; INTRAVENOUS at 07:31

## 2025-01-03 RX ADMIN — BUPIVACAINE 5 ML: 13.3 INJECTION, SUSPENSION, LIPOSOMAL INFILTRATION at 08:40

## 2025-01-03 RX ADMIN — SODIUM CHLORIDE, POTASSIUM CHLORIDE, SODIUM LACTATE AND CALCIUM CHLORIDE: 600; 310; 30; 20 INJECTION, SOLUTION INTRAVENOUS at 06:33

## 2025-01-03 RX ADMIN — KETOROLAC TROMETHAMINE 30 MG: 30 INJECTION, SOLUTION INTRAMUSCULAR at 08:28

## 2025-01-03 RX ADMIN — DEXAMETHASONE SODIUM PHOSPHATE 5 MG: 10 INJECTION, SOLUTION INTRAMUSCULAR; INTRAVENOUS at 08:46

## 2025-01-03 RX ADMIN — BUPIVACAINE HYDROCHLORIDE 10 ML: 2.5 INJECTION, SOLUTION EPIDURAL; INFILTRATION; INTRACAUDAL; PERINEURAL at 08:41

## 2025-01-03 RX ADMIN — BUPIVACAINE 5 ML: 13.3 INJECTION, SUSPENSION, LIPOSOMAL INFILTRATION at 08:41

## 2025-01-03 RX ADMIN — KETOROLAC TROMETHAMINE 30 MG: 30 INJECTION, SOLUTION INTRAMUSCULAR at 15:06

## 2025-01-03 RX ADMIN — KETOROLAC TROMETHAMINE 30 MG: 30 INJECTION, SOLUTION INTRAMUSCULAR at 21:29

## 2025-01-03 RX ADMIN — DEXAMETHASONE SODIUM PHOSPHATE 5 MG: 10 INJECTION, SOLUTION INTRAMUSCULAR; INTRAVENOUS at 08:41

## 2025-01-03 RX ADMIN — ONDANSETRON 4 MG: 2 INJECTION INTRAMUSCULAR; INTRAVENOUS at 07:21

## 2025-01-03 RX ADMIN — SODIUM CHLORIDE, POTASSIUM CHLORIDE, SODIUM LACTATE AND CALCIUM CHLORIDE 500 ML: 600; 310; 30; 20 INJECTION, SOLUTION INTRAVENOUS at 06:05

## 2025-01-03 RX ADMIN — BUPIVACAINE 5 ML: 13.3 INJECTION, SUSPENSION, LIPOSOMAL INFILTRATION at 08:47

## 2025-01-03 RX ADMIN — ACETAMINOPHEN 975 MG: 325 TABLET, FILM COATED ORAL at 12:31

## 2025-01-03 RX ADMIN — SENNOSIDES AND DOCUSATE SODIUM 1 TABLET: 8.6; 5 TABLET ORAL at 10:54

## 2025-01-03 RX ADMIN — BUPIVACAINE 5 ML: 13.3 INJECTION, SUSPENSION, LIPOSOMAL INFILTRATION at 08:46

## 2025-01-03 RX ADMIN — ACETAMINOPHEN 975 MG: 325 TABLET, FILM COATED ORAL at 06:29

## 2025-01-03 RX ADMIN — MORPHINE SULFATE 0.2 MG: 10 INJECTION, SOLUTION INTRAMUSCULAR; INTRAVENOUS at 07:31

## 2025-01-03 RX ADMIN — BUPIVACAINE HYDROCHLORIDE 10 ML: 2.5 INJECTION, SOLUTION EPIDURAL; INFILTRATION; INTRACAUDAL; PERINEURAL at 08:40

## 2025-01-03 RX ADMIN — METHYLERGONOVINE MALEATE 200 MCG: 0.2 INJECTION INTRAVENOUS at 08:05

## 2025-01-03 RX ADMIN — ACETAMINOPHEN 975 MG: 325 TABLET, FILM COATED ORAL at 18:30

## 2025-01-03 RX ADMIN — BUPIVACAINE HYDROCHLORIDE 10 ML: 2.5 INJECTION, SOLUTION EPIDURAL; INFILTRATION; INTRACAUDAL; PERINEURAL at 08:47

## 2025-01-03 RX ADMIN — DEXAMETHASONE SODIUM PHOSPHATE 5 MG: 10 INJECTION, SOLUTION INTRAMUSCULAR; INTRAVENOUS at 08:40

## 2025-01-03 RX ADMIN — PHENYLEPHRINE HYDROCHLORIDE 40 MCG/MIN: 10 INJECTION INTRAVENOUS at 07:31

## 2025-01-03 ASSESSMENT — ACTIVITIES OF DAILY LIVING (ADL)
ADLS_ACUITY_SCORE: 25
ADLS_ACUITY_SCORE: 30
ADLS_ACUITY_SCORE: 25
ADLS_ACUITY_SCORE: 24
ADLS_ACUITY_SCORE: 24
ADLS_ACUITY_SCORE: 21
ADLS_ACUITY_SCORE: 30
ADLS_ACUITY_SCORE: 30
ADLS_ACUITY_SCORE: 21
ADLS_ACUITY_SCORE: 21
ADLS_ACUITY_SCORE: 30
ADLS_ACUITY_SCORE: 25
ADLS_ACUITY_SCORE: 21
ADLS_ACUITY_SCORE: 30
ADLS_ACUITY_SCORE: 24
ADLS_ACUITY_SCORE: 24

## 2025-01-03 NOTE — PROGRESS NOTES
She is still asymptomatic and is in sinus rhythm.  Vital signs remains stable and normal.  Paged cardiology over an hour ago and paged again about 15 minutes ago, but not heard back yet.     Will call to consult YADI Lambert Mai, MD.

## 2025-01-03 NOTE — PROGRESS NOTES
S:  Section Delivery  B: Repeat  Section 1/3 at 0755  A: Baby delivered, cord clamping delayed for 60-90 seconds, then brought to pre- warmed infant warmer. Infant stimulated and dried. Infant then brought to mother and placed skin to skin for bonding. Apgars 9/10. Educated mother on expected feeding readiness cues and encouraged her to observe for feeding cues. Mother informed that breast feeding assistance would be provided.   R: Mother and baby bonding well. Anticipate first feed within the hour.

## 2025-01-03 NOTE — ANESTHESIA CARE TRANSFER NOTE
Patient: Brenda Bacon    Procedure: Procedure(s):   SECTION       Diagnosis: Prenatal care in third trimester [Z34.93]  History of  [Z98.891]  Diet controlled gestational diabetes mellitus (GDM) in third trimester [O24.410]  Diagnosis Additional Information: No value filed.    Anesthesia Type:   Spinal, Peripheral Nerve Block     Note:    Oropharynx: oropharynx clear of all foreign objects and spontaneously breathing  Level of Consciousness: awake  Oxygen Supplementation: room air    Independent Airway: airway patency satisfactory and stable  Dentition: dentition unchanged  Vital Signs Stable: post-procedure vital signs reviewed and stable  Report to RN Given: handoff report given  Patient transferred to: PACU  Comments: REMAINS IN BBB.  WILL OBTAIN A 12 LEAD EKG.  PCP HAS BEEN NOTIFIED AND WILL VIEW THE EKG ONCE OBTAINED.  Handoff Report: Identifed the Patient, Identified the Reponsible Provider, Reviewed the pertinent medical history, Discussed the surgical course, Reviewed Intra-OP anesthesia mangement and issues during anesthesia, Set expectations for post-procedure period and Allowed opportunity for questions and acknowledgement of understanding    Vitals:  Vitals Value Taken Time   /89 25 0852   Temp     Pulse 72 25 0855   Resp 10 25 0855   SpO2 99 % 25 0855   Vitals shown include unfiled device data.    Electronically Signed By: SHAHAB Valentin CRNA  January 3, 2025  8:56 AM

## 2025-01-03 NOTE — CONSULTS
[facility]  Consult Note - Hospitalist Service  Date of Admission:  1/3/2025  Consult Requested by: Petra Nielsen MD   Reason for Consult: new LBBB    Assessment & Plan   Brenda Bacon is a 29 year old female admitted on 1/3/2025  to the obstetrics floor for a  section.  At about the time of delivery, around 7:55 AM, anesthesia team noted a cardiac arrhythmia with a bundle branch block, new.  Patient was sinus rhythm for procedure.  Patient has spinal anesthesia patient received Ancef prior to .  No history of cardiac problems in the past.  A postoperative EKG was done and showed a left bundle branch block.  Patient asymptomatic.  Stat labs obtained including magnesium, TSH, BMP, and troponin.  Echocardiogram was ordered.  Hospitalist team consulted for recommendations.  Obstetrician also called maternal-fetal medicine and cardiology.  On repeat EKG at around 9:30 AM, patient was back in normal sinus rhythm.    Transient left bundle branch block  Left ventricular systolic function mildly reduced, EF 40 to 45%  Mild global hypokinesia of left ventricle  -- Patient was seen and examined.  Brenda denies any chest pain, palpitations, shortness of breath during or after .  -- Reviewed labs.  Initial and repeat troponin negative  -- Low magnesium at 1.5 (normal 1.7 - 2.3)  -- TSH wnl at 0.94    -- Discussed with Dr. Nielsen. He discussed with Cardiology from [facility]  -- If patient starts to develop symptoms, she will need to be transferred to [facility]   -- if patient stays asymptomatic, plan to discharge with Zio patch, cardiology follow-up  -- Continue telemetry overnight  -- Will see patient tomorrow    Etiology unclear.  There is significant hemodynamic changes during and towards the end of pregnancy, significant hormonal changes towards the end of pregnancy.  It is possible that changes in hemodynamics during surgery caused transient  "stress on the cardiovascular system.  Differential diagnosis is Peripartum Cardiomyopathy.  No murmurs on exam, no valvular abnormalities on echocardiogram.  Vital signs within normal limits, normotensive.    Hypomagnesemia   -- being replaced     History of gestational diabetes, diet-controlled       Clinically Significant Risk Factors Present on Admission             # Hypomagnesemia: Lowest Mg = 1.5 mg/dL in last 2 days, will replace as needed                 # Overweight: Estimated body mass index is 27.66 kg/m  as calculated from the following:    Height as of 24: 1.664 m (5' 5.5\").    Weight as of 24: 76.6 kg (168 lb 12.8 oz).              Lydia Blair MD  Hospitalist Service  Securely message with [employer] (more info)  Text page via [facility] Paging/Directory   ______________________________________________________________________    Chief Complaint   Consult for new left bundle branch block    History is obtained from the patient    History of Present Illness   Brenda Bacon is a 29 year old female admitted on 1/3/2025  to the obstetrics floor for a  section.  At about the time of delivery, around 7:55 AM, anesthesia team noted a cardiac arrhythmia with a bundle branch block, new.  Patient was sinus rhythm for procedure.  Patient has spinal anesthesia patient received Ancef prior to .  No history of cardiac problems in the past.  A postoperative EKG was done and showed a left bundle branch block.  Patient asymptomatic.  Stat labs obtained including magnesium, TSH, BMP, and troponin.  Echocardiogram was ordered.  Hospitalist team consulted for recommendations.  Obstetrician also called maternal-fetal medicine and cardiology.  On repeat EKG at around 9:30 AM, patient was back in normal sinus rhythm.      Past Medical History    Past Medical History:   Diagnosis Date    Cat-scratch disease 2004    Lyme disease     also cat scratch disease       Past Surgical History "   Past Surgical History:   Procedure Laterality Date     SECTION N/A 2023    Procedure:  SECTION;  Surgeon: Kendrick Delacruz MD;  [facility]     SECTION N/A 1/3/2025    Procedure:  SECTION;  Surgeon: Dotty Nielsen MD;  [facility]    EXCISE GANGLION WRIST  2013    Procedure: EXCISE GANGLION WRIST;  excision left wrist dorsal cyst;  Surgeon: Karina Webb MD;  Location: [facility] OR    EXCISE GANGLION WRIST Right 2014    Procedure: EXCISE GANGLION WRIST;  Surgeon: Danis Hodge MD;  Location: [facility] OR       Medications   I have reviewed this patient's current medications       Physical Exam   Vital Signs: Temp: 97.1  F (36.2  C) Temp src: Oral BP: 127/80 Pulse: 76   Resp: 16 SpO2: 98 % O2 Device: None (Room air)    Weight: 0 lbs 0 oz    Constitutional: awake, alert, cooperative, no apparent distress, and appears stated age  Respiratory: No increased work of breathing, good air exchange, clear to auscultation bilaterally, no crackles or wheezing  Cardiovascular: Normal apical impulse, regular rate and rhythm, normal S1 and S2, no S3 or S4, and no murmur noted    Medical Decision Making       55 MINUTES SPENT BY ME on the date of service doing chart review, history, exam, documentation & further activities per the note.      Data   ------------------------- PAST 24 HR DATA REVIEWED -----------------------------------------------    I have personally reviewed the following data over the past 24 hrs:    8.3  \   12.6   / 246     136 102 7.1 /  93   4.1 21 (L) 0.57 \     Trop: <6 BNP: N/A     TSH: 0.94 T4: N/A A1C: N/A       Imaging results reviewed over the past 24 hrs:   Recent Results (from the past 24 hours)   Echocardiogram Complete   Result Value    LVEF  40-45%    Narrative    [id number]  [id number]  [id number]  [id number]^MAYRA^DOTTY^SHERON     [facility]  Echocardiography Laboratory  [address] [place] [address]     Name: MAURILIO  CAN REA  MRN: [id number]  : 1995  Study Date: 2025 10:19 AM  Age: 29 yrs  Gender: Female  Patient Location: [facility]  Reason For Study: LBBB  History: gestational diabetes, delivered baby day of this exam  Ordering Physician: DOTTY NUNEZ  Performed By: Soniya Terry     BSA: 1.8 m2  Height: 65 in  Weight: 168 lb  HR: 63  BP: 113/71 mmHg  ______________________________________________________________________________  Procedure  Echocardiogram with two-dimensional, color and spectral Doppler.  ______________________________________________________________________________  Interpretation Summary     Left ventricular systolic function is mildly reduced.  The visual ejection fraction is 40-45%.  There is mild global hypokinesia of the left ventricle.  The right ventricle is normal in structure, function and size.  No hemodynamically significant valvular abnormalities on 2D or color flow  imaging. There is no comparison study available.  ______________________________________________________________________________  Left Ventricle  (The upper limit of normal is 5.6cm for left ventricular size in end-  diastole.). Left ventricular systolic function is mildly reduced. The visual  ejection fraction is 40-45%. Grade I or early diastolic dysfunction. There is  mild global hypokinesia of the left ventricle.     Right Ventricle  The right ventricle is normal in structure, function and size.     Atria  Normal left atrial size. Right atrium not well visualized. Right atrial size  is normal.     Mitral Valve  The mitral valve is normal in structure and function. There is trace to mild  mitral regurgitation. There is no mitral valve stenosis.     Tricuspid Valve  The tricuspid valve is not well visualized, but is grossly normal. Right  ventricular systolic pressure could not be approximated due to inadequate  tricuspid regurgitation.     Aortic Valve  The aortic valve is trileaflet. No aortic stenosis is present.      Pulmonic Valve  The pulmonic valve is not well visualized. Normal pulmonic valve velocity.     Vessels  Normal size aorta. The inferior vena cava is normal.     Pericardium  There is no pericardial effusion.     ______________________________________________________________________________  MMode/2D Measurements & Calculations  IVSd: 0.72 cm  LVIDd: 5.6 cm  LVIDs: 4.5 cm  LVPWd: 0.75 cm  FS: 18.7 %     LV mass(C)d: 146.9 grams  LV mass(C)dI: 80.0 grams/m2  Ao root diam: 2.5 cm  LA dimension: 2.9 cm  asc Aorta Diam: 2.4 cm  LA/Ao: 1.1  Ao root diam index Ht(cm/m): 1.5  Ao root diam index BSA (cm/m2): 1.4  Asc Ao diam index BSA (cm/m2): 1.3  Asc Ao diam index Ht(cm/m): 1.4  EF Biplane: 45.0 %  LA Volume (BP): 36.1 ml     LA Volume Index (BP): 19.6 ml/m2  RV Base: 2.9 cm  RWT: 0.27  TAPSE: 2.4 cm     Doppler Measurements & Calculations  MV E max luiz: 58.6 cm/sec  MV A max luiz: 51.4 cm/sec  MV E/A: 1.1  MV dec slope: 341.5 cm/sec2  MV dec time: 0.17 sec  Ao V2 max: 124.2 cm/sec  Ao max P.0 mmHg  LV V1 max PG: 3.3 mmHg  LV V1 max: 90.1 cm/sec  LV V1 VTI: 19.8 cm  PA acc time: 0.12 sec  TR max luiz: 179.4 cm/sec  TR max P.9 mmHg  AV Luiz Ratio (DI): 0.73  E/E' av.9  Lateral E/e': 4.8  Medial E/e': 7.1     RV S Luiz: 12.3 cm/sec     ______________________________________________________________________________  Report approved by: Herlinda Reeder MD on 2025 11:44 AM

## 2025-01-03 NOTE — PROGRESS NOTES
S:  Admission  B:  Brenda is a  @ 39w3d gestation, GBS negative, Hep. B neg, pregnancy complicated by gestational diabetes diet controled. EFW 7lbs per MD  A:  Patient admitted to room 330 for .  R: Patient prepped for surgery per protocol.

## 2025-01-03 NOTE — PROGRESS NOTES
Critical notes    At about the time of the delivery - around 0755 am, anesthesia team noted cardiac arrhythmia with right bundle branch.  New onset.  She was in sinus rhythm before the procedure.  She was completely asymptomatic and her vital sign was stable.  Was able to complete the procedure.    She has a spinal anesthesia.  IV medication before the onset of cardiac arrhythmia was Ancef.    She had no history of of cardiac disease or arrhythmia.  No old EKG available to compare.  Reviewed the medical record, she has a Holter monitoring in 2009 for syncope and it was normal - no concerning finding identified.    Reviewed postprocedure EKG showed LBBB.  Again, she is completely asymptomatic and her vital signs remained to be stable and normal.    Stat labs order including Mag, TSH, BMP, and troponin - results are pending.  Also ordered echocardiogram today.  Will place her on telemetry monitoring.    Consulted with Dr. Barron, the hospitalist, in person.  He will not be able to see her this morning but his colleague will see her this afternoon.  He agreed with the labs and the echocardiogram as well as telemetry monitoring.    At around 930 am, I was informed that she self-converted to sinus rhythm.  Reviewed follow-up EKG which confirmed of sinus rhythm with a heart rate of 59.  There is very subtle nonspecific T wave abnormality, concerning of potential anterior ischemia    Patient was updated about the finding.  Will continue to monitor closely.  Will keep an eye on the lab results and the echocardiogram.  I educated her about symptoms that need to let us know including chest pain, shortness of breath or if she has any concern.    Further evaluation/management per hospitalist's consultation.    ePtra Nielsen MD.       Total time:  about 30  minutes

## 2025-01-03 NOTE — PROGRESS NOTES
Reviewed the lab results.    Results for orders placed or performed during the hospital encounter of 01/03/25   Treponema Abs w Reflex to RPR and Titer     Status: Normal   Result Value Ref Range    Treponema Antibody Total Nonreactive Nonreactive   CBC with platelets     Status: Normal   Result Value Ref Range    WBC Count 8.3 4.0 - 11.0 10e3/uL    RBC Count 3.99 3.80 - 5.20 10e6/uL    Hemoglobin 12.6 11.7 - 15.7 g/dL    Hematocrit 36.1 35.0 - 47.0 %    MCV 91 78 - 100 fL    MCH 31.6 26.5 - 33.0 pg    MCHC 34.9 31.5 - 36.5 g/dL    RDW 13.1 10.0 - 15.0 %    Platelet Count 246 150 - 450 10e3/uL   Glucose     Status: Abnormal   Result Value Ref Range    Glucose 135 (H) 70 - 99 mg/dL   Magnesium     Status: Abnormal   Result Value Ref Range    Magnesium 1.5 (L) 1.7 - 2.3 mg/dL   Troponin T, High Sensitivity     Status: Normal   Result Value Ref Range    Troponin T, High Sensitivity <6 <=14 ng/L   Basic metabolic panel     Status: Abnormal   Result Value Ref Range    Sodium 136 135 - 145 mmol/L    Potassium 4.1 3.4 - 5.3 mmol/L    Chloride 102 98 - 107 mmol/L    Carbon Dioxide (CO2) 21 (L) 22 - 29 mmol/L    Anion Gap 13 7 - 15 mmol/L    Urea Nitrogen 7.1 6.0 - 20.0 mg/dL    Creatinine 0.57 0.51 - 0.95 mg/dL    GFR Estimate >90 >60 mL/min/1.73m2    Calcium 8.6 (L) 8.8 - 10.4 mg/dL    Glucose 93 70 - 99 mg/dL   TSH     Status: Normal   Result Value Ref Range    TSH 0.94 0.30 - 4.20 uIU/mL   EKG 12 lead     Status: None (Preliminary result)   Result Value Ref Range    Systolic Blood Pressure  mmHg    Diastolic Blood Pressure  mmHg    Ventricular Rate 66 BPM    Atrial Rate 66 BPM    NH Interval 130 ms    QRS Duration 154 ms     ms    QTc 484 ms    P Axis 45 degrees    R AXIS 54 degrees    T Axis 198 degrees    Interpretation ECG       Sinus rhythm  Left bundle branch block  Abnormal ECG  No previous ECGs available     EKG 12-lead, tracing only     Status: None   Result Value Ref Range    Systolic Blood Pressure  mmHg     Diastolic Blood Pressure  mmHg    Ventricular Rate 59 BPM    Atrial Rate 59 BPM    ID Interval 158 ms    QRS Duration 86 ms     ms    QTc 413 ms    P Axis 55 degrees    R AXIS 59 degrees    T Axis 41 degrees    Interpretation ECG       Sinus bradycardia  T wave abnormality, consider anterior ischemia  Abnormal ECG  When compared with ECG of 2025 09:17, (unconfirmed)  Left bundle branch block is no longer Present  Confirmed by KEVON HAWKINS ([id number]) on 1/3/2025 12:38:47 PM     Echocardiogram Complete     Status: None   Result Value Ref Range    LVEF  40-45%     [facility]    [id number]  [id number]  [id number]  [id number]^MAYRA^DOTTY^SHERON     [facility]  Echocardiography Laboratory  [address] [place] [address]     Name: CAN THORPE  MRN: [id number]  : 1995  Study Date: 2025 10:19 AM  Age: 29 yrs  Gender: Female  Patient Location: [facility]  Reason For Study: LBBB  History: gestational diabetes, delivered baby day of this exam  Ordering Physician: DOTTY NUNEZ  Performed By: Soniya Terry     BSA: 1.8 m2  Height: 65 in  Weight: 168 lb  HR: 63  BP: 113/71 mmHg  ______________________________________________________________________________  Procedure  Echocardiogram with two-dimensional, color and spectral Doppler.  ______________________________________________________________________________  Interpretation Summary     Left ventricular systolic function is mildly reduced.  The visual ejection fraction is 40-45%.  There is mild global hypokinesia of the left ventricle.  The right ventricle is normal in structure, function and size.  No hemodynamically significant valvular abnormalities on 2D or color flow  imaging. There is no comparison study available.  ______________________________________________________________________________  Left Ventricle  (The upper limit of normal is 5.6cm for left ventricular size in end-  diastole.). Left ventricular systolic  function is mildly reduced. The visual  ejection fraction is 40-45%. Grade I or early diastolic dysfunction. There is  mild global hypokinesia of the left ventricle.     Right Ventricle  The right ventricle is normal in structure, function and size.     Atria  Normal left atrial size. Right atrium not well visualized. Right atrial size  is normal.     Mitral Valve  The mitral valve is normal in structure and function. There is trace to mild  mitral regurgitation. There is no mitral valve stenosis.     Tricuspid Valve  The tricuspid valve is not well visualized, but is grossly normal. Right  ventricular systolic pressure could not be approximated due to inadequate  tricuspid regurgitation.     Aortic Valve  The aortic valve is trileaflet. No aortic stenosis is present.     Pulmonic Valve  The pulmonic valve is not well visualized. Normal pulmonic valve velocity.     Vessels  Normal size aorta. The inferior vena cava is normal.     Pericardium  There is no pericardial effusion.     ______________________________________________________________________________  MMode/2D Measurements & Calculations  IVSd: 0.72 cm  LVIDd: 5.6 cm  LVIDs: 4.5 cm  LVPWd: 0.75 cm  FS: 18.7 %     LV mass(C)d: 146.9 grams  LV mass(C)dI: 80.0 grams/m2  Ao root diam: 2.5 cm  LA dimension: 2.9 cm  asc Aorta Diam: 2.4 cm  LA/Ao: 1.1  Ao root diam index Ht(cm/m): 1.5  Ao root diam index BSA (cm/m2): 1.4  Asc Ao diam index BSA (cm/m2): 1.3  Asc Ao diam index Ht(cm/m): 1.4  EF Biplane: 45.0 %  LA Volume (BP): 36.1 ml     LA Volume Index (BP): 19.6 ml/m2  RV Base: 2.9 cm  RWT: 0.27  TAPSE: 2.4 cm     Doppler Measurements & Calculations  MV E max luiz: 58.6 cm/sec  MV A max luiz: 51.4 cm/sec  MV E/A: 1.1  MV dec slope: 341.5 cm/sec2  MV dec time: 0.17 sec  Ao V2 max: 124.2 cm/sec  Ao max P.0 mmHg  LV V1 max PG: 3.3 mmHg  LV V1 max: 90.1 cm/sec  LV V1 VTI: 19.8 cm  PA acc time: 0.12 sec  TR max luiz: 179.4 cm/sec  TR max P.9 mmHg  AV Luiz Ratio  (DI): 0.73  E/E' av.9  Lateral E/e': 4.8  Medial E/e': 7.1     RV S Luiz: 12.3 cm/sec     ______________________________________________________________________________  Report approved by: Herlinda Reeder MD on 2025 11:44 AM         Adult Type and Screen     Status: None   Result Value Ref Range    ABO/RH(D) A POS     Antibody Screen Negative Negative    SPECIMEN EXPIRATION DATE [id number]    Urine Drug Screen *Canceled*     Status: None ()    Narrative    The following orders were created for panel order Urine Drug Screen.  Procedure                               Abnormality         Status                     ---------                               -----------         ------                       Please view results for these tests on the individual orders.   ABO/Rh type and screen     Status: None    Narrative    The following orders were created for panel order ABO/Rh type and screen.  Procedure                               Abnormality         Status                     ---------                               -----------         ------                     Adult Type and Screen[[id number]]                            Final result                 Please view results for these tests on the individual orders.          Hemodynamically remained to be stable.  She is again completely asymptomatic.      Magnesium being replaced per protocol.    Paging out to cardiologist on-call to consult about the echocardiogram result.  Further evaluation and management per cardiologist recommendation.    Will update the patient about the finding.      Will continue to monitor closely.    Petra Nielsen MD.

## 2025-01-03 NOTE — PROGRESS NOTES
S: Transfer to postpartum  B:  birth @ 0755, breast feeding    A: Mother and baby transferred to postpartum unit at 1000 via bed after completion of immediate recovery period. Patient oriented to room. Mother and baby bonding well and in satisfactory condition upon transfer.  R: Anticipate routine postpartum care.

## 2025-01-03 NOTE — OR NURSING
Transfer from  PACU to Room 361. Report given to CHARLEE Kennedy.  Transferred to bed via transfer board.    S: 30 y/o female  S/P        Anesthesia Type:  Spinal with TAP block       Surgeon:  Dr. Nielsen       Allergies:  See Medication Reconciliation Record       DNR: No    B:  Pertinent Medical History:   Past Medical History:   Diagnosis Date    Cat-scratch disease 2004    Lyme disease     also cat scratch disease             Surgical History:    Past Surgical History:   Procedure Laterality Date     SECTION N/A 2023    Procedure:  SECTION;  Surgeon: Kendrick Delacruz MD;  Location: [facility] L[facility]D    EXCISE GANGLION WRIST  2013    Procedure: EXCISE GANGLION WRIST;  excision left wrist dorsal cyst;  Surgeon: Karina Webb MD;  Location: [facility] OR    EXCISE GANGLION WRIST Right 2014    Procedure: EXCISE GANGLION WRIST;  Surgeon: Danis Hodge MD;  Location: [facility] OR       A:  QBL: 432 mL        IVF:  750 mL        UOP:  None,         NPO:  ___Yes _x__No         Vomiting:  ___Yes _x__No         Drainage: None from incision, mod amt of vaginal bleeding        Skin Integrity: Focussed assessment, abdominal incision, CDI. See flowsheet.        RFO: _x__Yes :Urethral catheter    Received patient in PACU. Report from CRNA notes new BBB right around time of delivery. No previous arrhythmias in medical history. BBB persists in PACU. EKG ordered and completed showing sinus rhythm with a L BBB. Provider notified. Patient asymptomatic, denies chest pain, shortness of breath, dizziness, or nausea. Denies pain. All other vital signs stable, on RA. At approximately 0934, the monitor showed that the patient had spontaneously converted back to sinus rhythm. Repeat EKG ordered and confirmed sinus rhythm. Strips recorded and placed in patient's chart for review. Provider and Nurse updated. See flowsheets for assessment.                 See PACU record for ongoing assessment, vital signs  and pain assessment.    R: Post-Op vitals and assessments as ordered/indicated per patient's condition.       Follow Post-Op orders and notify Physician prn.       Continue to involve patient/family in plan of care and discharge planning.       Reinforce Pre-Operative education.       Implement skin safety interventions as appropriate.    Name: Yanira Santana RN on 1/3/2025 at 9:48 AM

## 2025-01-03 NOTE — PLAN OF CARE
Goal Outcome Evaluation:    Vitals stable. Pt denies preeclampsia symptoms. Fundus firm, midline, 1 below U. Flow remains light, no clots. EKG completed. Echo completed, awaiting results. Pending lab results. Tolerating oral fluids, will try crackers shortly. No nausea. Occasionally dizzy with position changes. Saline locked.

## 2025-01-03 NOTE — PROGRESS NOTES
Received a phone call from Dr.Ranjan Peña, cardiology from [facility].    He was updated about her condition.  Remained to be in sinus rhythm, completely asymptomatic.  Vital signs remained to be stable and normal.  Dr. Leyva recommended as follow-up:  Continue with telemetry monitoring throughout his hospitalization  Outpatient cardiology consult in 1 to 2 weeks  Zio patch for 1 week at discharge  4.   Transfer to [facility] if becomes symptomatic or develops cardiac arrhythmia.    Patient and her nurse were updated.  Dr. Pineda and the Hospitalist were updated as well    Will continue to monitor.    Petra Nielsen MD.

## 2025-01-03 NOTE — ANESTHESIA PROCEDURE NOTES
TAP Procedure Note    Pre-Procedure   Staff -        CRNA: Dexter Marroquin APRN CRNA       Other Anesthesia Staff: Jo-Ann Field       Performed By: CRNA and JYOTI       Location: OR       Pre-Anesthestic Checklist: patient identified, IV checked, site marked, risks and benefits discussed, informed consent, monitors and equipment checked, pre-op evaluation, at physician/surgeon's request and post-op pain management  Timeout:       Correct Patient: Yes        Correct Procedure: Yes        Correct Site: Yes        Correct Position: Yes        Correct Laterality: Yes        Site Marked: Yes  Procedure Documentation  Procedure: TAP         Laterality: bilateral       Patient Position: supine       Patient Prep/Sterile Barriers: sterile gloves, mask       Skin prep: Chloraprep       Needle Type: insulated       Needle Gauge: 22.        Needle Length (millimeters): 100        Ultrasound guided       1. Ultrasound was used to identify targeted nerve, plexus, vascular marker, or fascial plane and place a needle adjacent to it in real-time.       2. Ultrasound was used to visualize the spread of anesthetic in close proximity to the above referenced structure.       3. A permanent image is entered into the patient's record.       4. The visualized anatomic structures appeared normal.       5. There were no apparent abnormal pathologic findings.    Assessment/Narrative         The placement was negative for: blood aspirated, painful injection and site bleeding       Paresthesias: No.       Test dose of mL at.         Test dose negative, 3 minutes after injection, for signs of intravascular, subdural, or intrathecal injection.       Bolus given via needle..        Secured via.        Insertion/Infusion Method: Single Shot       Complications: none       Injection made incrementally with aspirations every 5 mL.     Comments:  Pt tolerated the procedure well as under SPINAL Anesthesia.  There was good visualization of the space  "between the internal oblique and the transverses abdominis.  There was also good visualization of fluid dissection in this layer.  No complications were noted.  I will follow up with this pt if needed.      FOR [facility] ONLY:   Pain Team Contact information: please page the Pain Team Via [employer]. Search \"Pain\". During daytime hours, please page the attending first. At night please page the resident first.      "

## 2025-01-03 NOTE — PROGRESS NOTES
Sign out was given to Dr. Aaron and Dr. Pineda    Likely to be discharged on Sunday.  I would like to see her in 2-3 days after discharging.  Nursing staff was updated.    Petra Nielsen MD.

## 2025-01-03 NOTE — H&P
Brenda is admitted for repeat  section.  She is  at 39w3d. Her first pregnancy was delivered by  secondary to breech presentation.   was not available at this hospital, she was offered to transfer care to hospital where  is honored but she declined.  She prefers repeat CS here.        Good prenatal care and there was no complication with the pregnancy. Pre-op physical was done couple weeks ago by Dr. Elvira Raphael and she was cleared for the procedure.      Doing well this morning and is ready to have the baby.  She and her  have no concern or questions.  Feeling normal fetal movements.  Not having much contractions and they are not painful.  No leakage or bleeding.  No cold symptoms include coughing, running nose, ST or fever.    Vital sign reviewed and normal  Alert, pleasant, no acute distress.  Lung is clear  Heart - RRR, no murmur  Abd - Appropriate for gestational age.  No tender with palpation to the to fundus. Felt to be cephalic position.  Legs:  No swelling.      A/P.     Term pregnancy with repeat .  Overall she is doing well.  No risk identified.  The whole procedure discussed with her and her  in details.  Risks associated with the procedure discussed and all of their questions were answered. Plan for repeat  this morning as schedule.  Discussed the plan with nursing staffs as well.     Petra Nielsen MD.

## 2025-01-03 NOTE — L&D DELIVERY NOTE
Delivery Summary    Brenda Bacon MRN# [id number]   Age: 29 year old YOB: 1995         Procedure note:    Procedure: Low transverse repeat a  section    Indication: Term pregnancy with history of  section, desirous for repeat testing    Surgeon:  Petra Nielsen MD.   Assistant Surgeon: Nilton Pineda MD    Antibiotic prior to surgery: Ancef 2. grams    Findings:    Healthy looking male with vertex presentation    -Apgar score:  9 and10 at 1 and 5 minutes respectively    -Birth weight:  7#9oz (3.430 kg)   Normal uterus, fallopian tubes, fimbriae and ovaries   Maternal cardiac arrhythmia at the time delivery - RBBB, no symptoms    - Post procedure EKG showed LBBB (no history of LBBB)    EBL:  435 ml      The whole procedure was then discussed with the patient and her  in details.  We also discussed potential associated complications which included but were not limited to bleeding, infection, unintended injury to the bowel or surrounding organs, unintended injury to the fetus/, and/or anesthesia complications.  In rare case, heavy bleeding may require blood transfusion or hysterectomy.  The patient was okay to proceed with the procedure and the consent was signed.    I asked Dr. Pineda, FP/OB to assist in the  Section because of  his special skill set possessing in Myotomy repair, fascial closure, and special skin closure techniques.  Dr. Pineda was a vital assistant in all of the above listed procedures. His skills allowed us to shorten the length of the procedure considerably, which has been shown to decrease risk of infection, decrease post operative morbidity, and improve outcomes.      DESCRIPTION OF THE PROCEDURE:    The patient was given a dose of Ancelf before the procedure.  She was then taken to the operating room.  Gottlieb catheter was placed after she had a spinal.  She was given spinal anesthesia with good effect by CRNA.  She was placed in the supine  position.  The abdomen was prepped and draped in the usual sterile manner.      A low transverse abdominal incision on top of the previous  section scar was made, about 11 cm in length.  The incision was taken down sharply to the fascia.  The fascia was incised and extended bilaterally.  The rectus abdominus was taken down from the fascia with fingers side to side.  The rectus abdominus was divided in the midline and extended vertically bilaterally.  The peritoneum was then carefully entered and extended vertically by fingers. The uterus was identified.  Shiv-O retractor was placed in in the usual manner.  Bladder flap was created and taken down sharply with fingers.  A low transverse uterine incision was made at the midline and extended bilaterally with fingers.  There was about 250 ml of clear non-odorous amniotic fluid noted.      The baby was in the vertex position.  The head was delivered first followed  by the delivery of the body in a usual manner with no difficulty.  Mouth was gently cleaned with gauss and bulb suctioning.  The cord was then clamped and cut after 2 minutes of cord delay.  The infant was passed to the warmer with nursing attending.  Apgar score of 9 and 10 at 1 and 5 minutes respectively.  BW was 7#9oz or 3.430 kg    The placenta was delivered spontaneously.  The placenta was examined, and it looked normal with a 3-vessel cord noted.  There were no abnormalities or excessive calcifications on the placenta.  The uterus was then massaged aggressively.  1 ml of Pitocin was injected directly into the fundus.  Thirty units of Pitocin was infused at the bolus rate.  The uterus was then grasped and cleaned with wet laps.  Good hemostasis noted.  The uterine incision was then grasped with Powell and then closed with locking suture using O Vycryl.  The second imbricated suture applied along the urine incision in the usual manner, using 0 chromic.  She was again noted of good  hemostasis.    Right at the time of the day delivery, the anesthesia team noted a cardiac erythema with right bundle branch.  She was she was completely asymptomatic.  Vital signs were normal and stable.  It persists throughout the rest of procedure.  The cardiac erythremia postprocedural EKG indicated of LBBB.  No known history of cardiac erythremia.  No old EKG for comparison.  Please see the critical/progress note for further details.  Again, she remained to be completely asymptomatic and her vital signs have been stable and normal.    The gutter was then cleaned of blood and clots.  Examination of the fallopian tubes, fimbria and ovaries was normal.      The uterine incision was then irrigated and examined carefully again, which showed good hemostasis. The peritoneum fascia was closed with 3.0 Vicryl. The fascia was then closed with running suture of O-Vicryl in the usual manner.  The subcutaneous tissue was then irrigated and proximate with run-on suturing, using thing 3.0 vicryl.  The skin was then closed with Quilt suture.    Bredna tolerated the procedure well.  Sponge, needle, and instrument counts were correct.  Estimated blood loss was about 450 ml.  She was taken to the recovery room in good condition after the nerve block.  Routine post partum care was initiated.  She and her  were updated about the procedure.  All of their questions were answered.  Thank you appreciate      Petra Nielsen MD.       Kolton BaconDavideBrenda [[id number]]      Labor Length      3rd Stage (hrs): 0 (min): 4          Labor Events     labor?: No   steroids: None  Labor Type: Scheduled   Predominate monitoring during 1st stage: continuous electronic fetal monitoring     Antibiotics received during labor?: No       Rupture date/time:     Fluid color: Clear  Fluid odor: Normal     Augmentation: None       Delivery/Placenta Date and Time      Delivery Date: 1/3/25 Delivery Time:  7:55 AM   Placenta  "Date/Time: 1/3/2025  8:00 AM  Oxytocin given at the time of delivery: after delivery of placenta  Delivering clinician: Petra Nielsen MD   Other personnel present at delivery:  Provider Role   Marcia Biggs RN Registered Nurse   Dayron Pineda MD Assistant Surgeon   Solitario Velazquez Medical Student             Apgars    Living status: Living   1 Minute 5 Minute 10 Minute 15 Minute 20 Minute   Skin color: 1  2       Heart rate: 2  2       Reflex irritability: 2  2       Muscle tone: 2  2       Respiratory effort: 2  2       Total: 9  10       Apgars assigned by: MARCIA BIGGS       Cord      Vessels: 3 Vessels    Cord Complications: None               Cord Blood Disposition: Lab    Gases Sent?: No    Delayed cord clamping?: Yes    Cord Clamping Delay (seconds):  seconds            Resuscitation    Methods: None  Output in Delivery Room: Voided        Measurements      Weight: 7 lb 9 oz Length: 1' 7\"     Head circumference: 35.6 cm Chest circumference: 34.3 cm   Output in delivery room: Voided       Skin to Skin and Feeding Plan      Skin to skin initiation date/time: 1841    Skin to skin with: Mother  Skin to skin end date/time:            Labor Events and Shoulder Dystocia    Fetal Tracing Prior to Delivery: Category 1  Shoulder dystocia present?: Neg       Delivery (Maternal) (Provider to Complete) ([id number])    Episiotomy: None  Perineal lacerations: None    Repair suture: None  Genital tract inspection done: Neg  Comment if genital tract inspection not done: Delivered via        Blood Loss  Mother: Brenda Bacon #[id number]     Start of Mother's Information      Delivery Blood Loss   Intrapartum & Postpartum: 25 0750 - 25 0910    Delivery Admission: 25 0533 - 25 0910         Intrapartum & Postpartum Delivery Admission    Total Surgical QBL Blood Loss (mL) Hospital Encounter 432 mL 432 mL    Total  432 mL 432 mL               End of " Mother's Information  Mother: Brenda Bacon #[id number]                Delivery - Provider to Complete ([id number])    Delivering clinician: Petra Nielsen MD  Delivery Type (Choose the 1 that will go to the Birth History): , Low Transverse                          Priority: Scheduled      Specifics: Repeat     Indications for : Planned repeat     For  or operative vaginal delivery, labor was most recently managed by (if not delivering provider): Petra Nielsen MD     Other personnel:  Provider Role   Marcia Marks RN Registered Nurse   Dayron Pineda MD Assistant Surgeon   Solitario Velazquez Medical Student                    Placenta    Date/Time: 1/3/2025  8:00 AM  Removal: Spontaneous  Comments: healthy looking placenta with three vessels cord  Disposition: Hospital disposal             Anesthesia    Method: Spinal                    Presentation and Position    Presentation: Vertex                    Petra Majano Mai, MD

## 2025-01-03 NOTE — ANESTHESIA PREPROCEDURE EVALUATION
Anesthesia Pre-Procedure Evaluation    Patient: Brenda Bacon   MRN: [id number] : 1995        Procedure : Procedure(s):   SECTION          Past Medical History:   Diagnosis Date    Cat-scratch disease 2004    Lyme disease     also cat scratch disease      Past Surgical History:   Procedure Laterality Date     SECTION N/A 2023    Procedure:  SECTION;  Surgeon: Kendrick Delacruz MD;  Location: [facility] L[facility]    EXCISE GANGLION WRIST  2013    Procedure: EXCISE GANGLION WRIST;  excision left wrist dorsal cyst;  Surgeon: Karina Webb MD;  Location: [facility] OR    EXCISE GANGLION WRIST Right 2014    Procedure: EXCISE GANGLION WRIST;  Surgeon: Danis Hodge MD;  Location: [facility] OR      Allergies   Allergen Reactions    No Known Allergies       Social History     Tobacco Use    Smoking status: Never    Smokeless tobacco: Never    Tobacco comments:     NO NICOTINE EXPOSURE @ HOME   Substance Use Topics    Alcohol use: Not Currently     Comment: social-quit with pregnancy      Wt Readings from Last 1 Encounters:   24 76.6 kg (168 lb 12.8 oz)        Anesthesia Evaluation   Pt has had prior anesthetic. Type: Regional.        ROS/MED HX  ENT/Pulmonary:  - neg pulmonary ROS     Neurologic:  - neg neurologic ROS     Cardiovascular:  - neg cardiovascular ROS     METS/Exercise Tolerance:     Hematologic:  - neg hematologic  ROS     Musculoskeletal:  - neg musculoskeletal ROS     GI/Hepatic:  - neg GI/hepatic ROS     Renal/Genitourinary:  - neg Renal ROS     Endo: Comment: Diet controlled gestational diabetes  A1C  - 5.1 on 24   (-) Type II DM   Psychiatric/Substance Use:  - neg psychiatric ROS     Infectious Disease:  - neg infectious disease ROS     Malignancy:  - neg malignancy ROS     Other:  - neg other ROS          Physical Exam    Airway  airway exam normal      Mallampati: II   TM distance: > 3 FB   Neck ROM: full   Mouth opening: > 3 cm    Respiratory  "Devices and Support         Dental       (+) Completely normal teeth      Cardiovascular   cardiovascular exam normal       Rhythm and rate: regular and normal     Pulmonary   pulmonary exam normal        breath sounds clear to auscultation           OUTSIDE LABS:  CBC:   Lab Results   Component Value Date    WBC 12.2 (H) 10/18/2024    WBC 9.7 06/07/2024    HGB 11.9 10/18/2024    HGB 13.9 06/07/2024    HCT 34.6 (L) 10/18/2024    HCT 39.9 06/07/2024     10/18/2024     06/07/2024     BMP:   Lab Results   Component Value Date     12/31/2024     08/02/2016    POTASSIUM 3.9 12/31/2024    POTASSIUM 3.7 08/02/2016    CHLORIDE 102 12/31/2024    CHLORIDE 104 08/02/2016    CO2 21 (L) 12/31/2024    CO2 29 08/02/2016    BUN 11.4 12/31/2024    BUN 11 08/02/2016    CR 0.51 12/31/2024    CR 0.51 (L) 07/21/2022     (H) 12/31/2024    GLC 83 02/20/2023     COAGS: No results found for: \"PTT\", \"INR\", \"FIBR\"  POC:   Lab Results   Component Value Date    HCG Negative 11/26/2014     HEPATIC:   Lab Results   Component Value Date    ALT 45 07/21/2022    AST 26 07/21/2022     OTHER:   Lab Results   Component Value Date    PH 7.5 (H) 07/09/2002    A1C 5.1 06/07/2024    MAYUR 8.3 (L) 12/31/2024    TSH 0.64 09/27/2016    CRP <2.9 08/02/2016    SED 8 08/02/2016       Anesthesia Plan    ASA Status:  2    NPO Status:  NPO Appropriate    Anesthesia Type: Spinal.              Consents    Anesthesia Plan(s) and associated risks, benefits, and realistic alternatives discussed. Questions answered and patient/representative(s) expressed understanding.     - Discussed:     - Discussed with:  Patient      - Extended Intubation/Ventilatory Support Discussed: No.      - Patient is DNR/DNI Status: No     Use of blood products discussed: No .     Postoperative Care    Pain management: Oral pain medications, IV analgesics, Peripheral nerve block (Single Shot), Multi-modal analgesia.   PONV prophylaxis: Ondansetron (or other " 5HT-3), Dexamethasone or Solumedrol     Comments:    Other Comments: The risks and benefits of anesthesia, and the alternatives where applicable, have been discussed with the patient, and they wish to proceed.           SHAHAB Valentin CRNA    I have reviewed the pertinent notes and labs in the chart from the past 30 days and (re)examined the patient.  Any updates or changes from those notes are reflected in this note.

## 2025-01-03 NOTE — PROGRESS NOTES
Dr. Nielsen called to ask if hospitalist has rounded on patient yet, informed Dr. Nielsen that no hospitalist has rounded. Dr. Nielsen wants RN to call down to med/surg and ask when to expect rounding on patient. RN called med/surg [facility]. [facility] informed RN that MD was in 1400 huddle and [facility] will notify hospitalist of OB trying to reach him after out of meeting in a few minutes. RN will follow up if no response.

## 2025-01-03 NOTE — ANESTHESIA PROCEDURE NOTES
"Intrathecal Procedure Note    Pre-Procedure   Staff -        CRNA: Dexter Marroquin APRN CRNA       Performed By: CRNA       Location: OR       Pre-Anesthestic Checklist: patient identified, IV checked, risks and benefits discussed, informed consent, monitors and equipment checked and pre-op evaluation  Timeout:       Correct Patient: Yes        Correct Procedure: Yes        Correct Site: Yes        Correct Position: Yes   Procedure Documentation  Procedure: intrathecal         Diagnosis: term pregnancy - previous        Patient Position: sitting       Patient Prep/Sterile Barriers: sterile gloves, mask, patient draped       Skin prep: Betadine       Insertion Site: L3-4. (midline approach).       Needle Gauge: 25.        Needle Length (Inches): 3.5        Spinal Needle Type: Pencan       Introducer used       Introducer: 20 G       # of attempts: 1 and  # of redirects:  1    Assessment/Narrative         Paresthesias: No.       CSF fluid: clear.       Opening pressure was cmH2O while  Sitting.       Comments:  Chhaya spinal placement without noted incident      FOR [employer] ([facility]) ONLY:   Pain Team Contact information: please page the Pain Team Via [employer]. Search \"Pain\". During daytime hours, please page the attending first. At night please page the resident first.      "

## 2025-01-03 NOTE — PROGRESS NOTES
Talked to MFM. Concern of the echo finding - recommend further cardiology work up asap.      I am planning to consult with the [place] cardiology if not hearing from [place] cardiology within the next 10 minutes.      Multiple attempts by nursing staffs to get in touch with the cardiologist in the last couple hours but no response yet.    Also have the nurse to check with the hospitalist on the status of the consultation.    Petra Nielsen MD.

## 2025-01-04 LAB
ANION GAP SERPL CALCULATED.3IONS-SCNC: 13 MMOL/L (ref 7–15)
ATRIAL RATE - MUSE: 72 BPM
BUN SERPL-MCNC: 7.2 MG/DL (ref 6–20)
CALCIUM SERPL-MCNC: 8.9 MG/DL (ref 8.8–10.4)
CHLORIDE SERPL-SCNC: 102 MMOL/L (ref 98–107)
CREAT SERPL-MCNC: 0.6 MG/DL (ref 0.51–0.95)
DIASTOLIC BLOOD PRESSURE - MUSE: NORMAL MMHG
EGFRCR SERPLBLD CKD-EPI 2021: >90 ML/MIN/1.73M2
ERYTHROCYTE [DISTWIDTH] IN BLOOD BY AUTOMATED COUNT: 13 % (ref 10–15)
GLUCOSE SERPL-MCNC: 82 MG/DL (ref 70–99)
HCO3 SERPL-SCNC: 20 MMOL/L (ref 22–29)
HCT VFR BLD AUTO: 34.7 % (ref 35–47)
HGB BLD-MCNC: 12.1 G/DL (ref 11.7–15.7)
HGB BLD-MCNC: 12.1 G/DL (ref 11.7–15.7)
INTERPRETATION ECG - MUSE: NORMAL
MCH RBC QN AUTO: 31.8 PG (ref 26.5–33)
MCHC RBC AUTO-ENTMCNC: 34.9 G/DL (ref 31.5–36.5)
MCV RBC AUTO: 91 FL (ref 78–100)
NT-PROBNP SERPL-MCNC: 126 PG/ML (ref 0–450)
P AXIS - MUSE: 42 DEGREES
PLATELET # BLD AUTO: 276 10E3/UL (ref 150–450)
POTASSIUM SERPL-SCNC: 4.2 MMOL/L (ref 3.4–5.3)
PR INTERVAL - MUSE: 156 MS
QRS DURATION - MUSE: 80 MS
QT - MUSE: 396 MS
QTC - MUSE: 433 MS
R AXIS - MUSE: 73 DEGREES
RBC # BLD AUTO: 3.8 10E6/UL (ref 3.8–5.2)
SODIUM SERPL-SCNC: 135 MMOL/L (ref 135–145)
SYSTOLIC BLOOD PRESSURE - MUSE: NORMAL MMHG
T AXIS - MUSE: 55 DEGREES
TROPONIN T SERPL HS-MCNC: <6 NG/L
TSH SERPL DL<=0.005 MIU/L-ACNC: 1 UIU/ML (ref 0.3–4.2)
VENTRICULAR RATE- MUSE: 72 BPM
WBC # BLD AUTO: 14.9 10E3/UL (ref 4–11)

## 2025-01-04 PROCEDURE — 99232 SBSQ HOSP IP/OBS MODERATE 35: CPT | Mod: 24 | Performed by: FAMILY MEDICINE

## 2025-01-04 PROCEDURE — 93010 ELECTROCARDIOGRAM REPORT: CPT | Performed by: INTERNAL MEDICINE

## 2025-01-04 PROCEDURE — 36415 COLL VENOUS BLD VENIPUNCTURE: CPT | Performed by: INTERNAL MEDICINE

## 2025-01-04 PROCEDURE — 99232 SBSQ HOSP IP/OBS MODERATE 35: CPT | Performed by: STUDENT IN AN ORGANIZED HEALTH CARE EDUCATION/TRAINING PROGRAM

## 2025-01-04 PROCEDURE — 36415 COLL VENOUS BLD VENIPUNCTURE: CPT | Performed by: FAMILY MEDICINE

## 2025-01-04 PROCEDURE — 83880 ASSAY OF NATRIURETIC PEPTIDE: CPT | Performed by: FAMILY MEDICINE

## 2025-01-04 PROCEDURE — 82435 ASSAY OF BLOOD CHLORIDE: CPT | Performed by: INTERNAL MEDICINE

## 2025-01-04 PROCEDURE — 250N000013 HC RX MED GY IP 250 OP 250 PS 637: Performed by: FAMILY MEDICINE

## 2025-01-04 PROCEDURE — 80048 BASIC METABOLIC PNL TOTAL CA: CPT | Performed by: INTERNAL MEDICINE

## 2025-01-04 PROCEDURE — 85018 HEMOGLOBIN: CPT | Performed by: FAMILY MEDICINE

## 2025-01-04 PROCEDURE — 85048 AUTOMATED LEUKOCYTE COUNT: CPT | Performed by: INTERNAL MEDICINE

## 2025-01-04 PROCEDURE — 82374 ASSAY BLOOD CARBON DIOXIDE: CPT | Performed by: INTERNAL MEDICINE

## 2025-01-04 PROCEDURE — 250N000011 HC RX IP 250 OP 636: Performed by: FAMILY MEDICINE

## 2025-01-04 PROCEDURE — 84443 ASSAY THYROID STIM HORMONE: CPT | Performed by: INTERNAL MEDICINE

## 2025-01-04 PROCEDURE — 120N000013 HC R&B IMCU

## 2025-01-04 PROCEDURE — 85014 HEMATOCRIT: CPT | Performed by: INTERNAL MEDICINE

## 2025-01-04 PROCEDURE — 84484 ASSAY OF TROPONIN QUANT: CPT | Performed by: FAMILY MEDICINE

## 2025-01-04 PROCEDURE — 93005 ELECTROCARDIOGRAM TRACING: CPT

## 2025-01-04 RX ADMIN — ACETAMINOPHEN 975 MG: 325 TABLET, FILM COATED ORAL at 12:18

## 2025-01-04 RX ADMIN — ACETAMINOPHEN 975 MG: 325 TABLET, FILM COATED ORAL at 06:40

## 2025-01-04 RX ADMIN — SENNOSIDES AND DOCUSATE SODIUM 2 TABLET: 8.6; 5 TABLET ORAL at 09:29

## 2025-01-04 RX ADMIN — IBUPROFEN 800 MG: 800 TABLET, FILM COATED ORAL at 09:28

## 2025-01-04 RX ADMIN — IBUPROFEN 800 MG: 800 TABLET, FILM COATED ORAL at 20:45

## 2025-01-04 RX ADMIN — SENNOSIDES AND DOCUSATE SODIUM 2 TABLET: 8.6; 5 TABLET ORAL at 20:45

## 2025-01-04 RX ADMIN — KETOROLAC TROMETHAMINE 30 MG: 30 INJECTION, SOLUTION INTRAMUSCULAR at 02:55

## 2025-01-04 RX ADMIN — ACETAMINOPHEN 975 MG: 325 TABLET, FILM COATED ORAL at 00:36

## 2025-01-04 RX ADMIN — PRENATAL VIT W/ FE FUMARATE-FA TAB 27-0.8 MG 1 TABLET: 27-0.8 TAB at 09:28

## 2025-01-04 RX ADMIN — IBUPROFEN 800 MG: 800 TABLET, FILM COATED ORAL at 15:03

## 2025-01-04 RX ADMIN — ACETAMINOPHEN 975 MG: 325 TABLET, FILM COATED ORAL at 18:29

## 2025-01-04 ASSESSMENT — ACTIVITIES OF DAILY LIVING (ADL)
ADLS_ACUITY_SCORE: 27
ADLS_ACUITY_SCORE: 29
ADLS_ACUITY_SCORE: 27
ADLS_ACUITY_SCORE: 29
ADLS_ACUITY_SCORE: 29
ADLS_ACUITY_SCORE: 27
ADLS_ACUITY_SCORE: 29
ADLS_ACUITY_SCORE: 27
ADLS_ACUITY_SCORE: 29
ADLS_ACUITY_SCORE: 27
ADLS_ACUITY_SCORE: 27

## 2025-01-04 NOTE — PROGRESS NOTES
Notified by [employer] that patients heart rate in the 130s. Patient currently in the bathroom cleaning up. No complaints of chest pain or dyspnea. Dr. Pineda on site rounding and made aware. Plan to have patient rest for 5 minutes and check heart rate.

## 2025-01-04 NOTE — PROGRESS NOTES
"Ms Charge nurse called to update on patient being in a junctional rhythm. Dr Prasad called and is coming in. Order for a 12 lead EKG in. Patient denies any shortness of breath. States she \" feels fine\"  "

## 2025-01-04 NOTE — ANESTHESIA POSTPROCEDURE EVALUATION
Patient: Brenda Bacon    Procedure: Procedure(s):   SECTION       Anesthesia Type:  Spinal, Peripheral Nerve Block    Note:  Disposition: Inpatient   Postop Pain Control: Uneventful            Sign Out: Well controlled pain   PONV: No   Neuro/Psych: Uneventful            Sign Out: Acceptable/Baseline neuro status   Airway/Respiratory: Uneventful            Sign Out: Acceptable/Baseline resp. status   CV/Hemodynamics:             Sign Out: Detailed CV status (pt has had past cardiac issues and now has a slightly decreased EF in the 45% range and a BBB)               Blood Pressure: Normal               Rate/Rhythm: Tachycardia               Perfusion:  Adequate perfusion indices   Other NRE: NONE   DID A NON-ROUTINE EVENT OCCUR? No    Event details/Postop Comments:  Pt was happy with her anesthesia care. I advised the pt she may have some soreness at the spinal site and this is normal.  However if the soreness continues over a week or if redness is noted around the site to let anesthesia know.  Her cardiac status will be followed up outpt.  I will follow up with the pt if needed.       Last vitals:  Vitals Value Taken Time   /89 25 0945   Temp     Pulse 71 25 0947   Resp 35 25 0947   SpO2 99 % 25 0946   Vitals shown include unfiled device data.    Electronically Signed By: SHAHAB Plummer CRNA  2025  2:20 PM

## 2025-01-04 NOTE — PROGRESS NOTES
Subjective: I was contacted by nursing staff that the patient had had another episode of a bundle branch block possibly a junctional rhythm.  Lasted 3 to 5 minutes during this time the patient was completely asymptomatic had no chest pain no shortness of breath.  Vital signs were stable.  I came into the hospital the nursing staff had done a twelve-lead EKG but but the time that was performed the patient was back into normal sinus rhythm.  Since the patient is being monitored by the telemetry unit down on the MedSur floor the charge nurse down there did get the overnight hospitalist involved and she was evaluating the rhythm also.  When I arrived at the hospital the patient was back in sinus rhythm and completely asymptomatic feeling fine has never had any symptoms throughout her postoperative course.    Objective:  Pulse: 71  Blood pressure: 124/75  Respirations: 16  Lungs clear    EKG: Normal sinus rhythm  Rhythm strip remarkable for a short OK with aberrantly conducted rhythm during the episodic rhythm change.    Troponin, BNP, CBC, TSH all pending at this time    Assessment: Aberrantly conducted rhythm in an asymptomatic patient    Plan: I did speak with Dr. Davison the cardiologist on-call for [facility].  After he got the history of the patient including her echocardiogram which showed a EF of 40 to 45% he was not concerned.  He said that he would just watch this and work this up as an outpatient.  If her rhythm changed to a more concerning rhythm then consideration of transfer would be warranted but not at this time.  The hospitalist was also involved because of the telemetry service being on the MedSurg floor and she was also in agreement that observation was prudent in an asymptomatic patient.  If there are any abnormalities on laboratory studies reconsideration may be warranted but not at this time I did explain this to the patient and her  and they were accepting of this care plan.        Scar Prasad MD

## 2025-01-04 NOTE — PROGRESS NOTES
Subjective: Brenda is now postopday # 1. She reports adequate pain control and no other c/o.   She is voiding well at this point.  There was an episode where she developed recurrent left bundle branch block and this was evaluated by Dr. Aaron at 5 AM today and labs were drawn and I have reviewed all the labs and they are normal.  I have reassured her about that.  She is completely asymptomatic.  No chest pain no dyspnea no syncope or other concerns.  Objective: VSS. /81   Pulse 76   Temp 98  F (36.7  C) (Oral)   Resp 18   LMP 2024 (Approximate)   SpO2 100%   Breastfeeding Unknown     Chest is clear to auscultation. No wheezes, rales or rhonchi heard. cardiac exam is normal with s1, s2, no murmurs or adventitious sounds.Normal rate and rhythm is heard.    Abdomen is soft and fundus is firm and not appreciably tender.    Extremities without edema.Incision is clean, dry, intact.  There was some serous drainage earlier this morning but the incision looks good.  There is no redness no fluctuance to suggest a seroma or hematoma.    HGB:12.1  TSH is 1  Basic panel is normal  BNP and troponin are normal.    Assessment: 1.stable postop course on post op day #  1 from  section.    2.  Spontaneous new appearance of left bundle branch block which is intermittent but the patient is asymptomatic and lab testing is unremarkable.  Echocardiogram showed a slightly low ejection fraction of 40 to 45% but since patient is asymptomatic this will be evaluated as an outpatient and cardiology has been involved.    Plan: Hope to discharge in 1-2 days       COY Pineda MD

## 2025-01-04 NOTE — PROGRESS NOTES
Dr. Prasad updated about patient having 5 minutes of bundle branch block at 0325. Patient was asymptomatic and denied discomfort. Will continue to observe patient and report any further changes.

## 2025-01-04 NOTE — PLAN OF CARE
Goal Outcome Evaluation:  S: Shift review  B:Brenda is a , day 1 post  birth.  A: Stable post-op, incision is open to air, clean, dry and intact, independent with mobility, pain control achieved with scheduled pain meds. Handles baby with confidence.  R: Continue with plan of care.

## 2025-01-05 VITALS
HEART RATE: 108 BPM | TEMPERATURE: 98.2 F | DIASTOLIC BLOOD PRESSURE: 79 MMHG | RESPIRATION RATE: 16 BRPM | OXYGEN SATURATION: 99 % | WEIGHT: 160.2 LBS | SYSTOLIC BLOOD PRESSURE: 126 MMHG | BODY MASS INDEX: 26.25 KG/M2

## 2025-01-05 PROCEDURE — 250N000013 HC RX MED GY IP 250 OP 250 PS 637: Performed by: FAMILY MEDICINE

## 2025-01-05 RX ORDER — IBUPROFEN 600 MG/1
600 TABLET, FILM COATED ORAL EVERY 6 HOURS PRN
Qty: 60 TABLET | Refills: 1 | Status: SHIPPED | OUTPATIENT
Start: 2025-01-05

## 2025-01-05 RX ADMIN — IBUPROFEN 800 MG: 800 TABLET, FILM COATED ORAL at 09:14

## 2025-01-05 RX ADMIN — SENNOSIDES AND DOCUSATE SODIUM 2 TABLET: 8.6; 5 TABLET ORAL at 09:14

## 2025-01-05 RX ADMIN — IBUPROFEN 800 MG: 800 TABLET, FILM COATED ORAL at 03:17

## 2025-01-05 RX ADMIN — ACETAMINOPHEN 975 MG: 325 TABLET, FILM COATED ORAL at 00:30

## 2025-01-05 RX ADMIN — PRENATAL VIT W/ FE FUMARATE-FA TAB 27-0.8 MG 1 TABLET: 27-0.8 TAB at 09:14

## 2025-01-05 RX ADMIN — ACETAMINOPHEN 975 MG: 325 TABLET, FILM COATED ORAL at 06:15

## 2025-01-05 ASSESSMENT — ACTIVITIES OF DAILY LIVING (ADL)
ADLS_ACUITY_SCORE: 27
ADLS_ACUITY_SCORE: 32
ADLS_ACUITY_SCORE: 27
ADLS_ACUITY_SCORE: 32
ADLS_ACUITY_SCORE: 27

## 2025-01-05 NOTE — DISCHARGE INSTRUCTIONS
Discharge instructions:    You will have a follow-up appointment with Dr. Nielsen to look at your  section incision.  If you should develop any headaches or blurred vision or significant swelling of your hands face or legs then make sure that you get checked in the clinic or in the emergency room if it is after hours.  You will receive an appointment from the cardiology team to have a cardiology consultation sometime soon.    Recheck acutely if you develop any significant wound redness or fever or heavy vaginal bleeding or any other concerns.    No intercourse for 6 weeks.  No baths for 1 week but showers are okay anytime.    Congratulations on your new arrival!     COY Pineda MD

## 2025-01-05 NOTE — PROGRESS NOTES
[facility]    Medicine Progress Note - Hospitalist Service    Date of Admission:  1/3/2025    Assessment & Plan   Brenda Bacon is a 29 year old female admitted on 1/3/2025  to the obstetrics floor for a  section.  At about the time of delivery, around 7:55 AM, anesthesia team noted a cardiac arrhythmia with a bundle branch block, new.  Patient was sinus rhythm for procedure.  Patient has spinal anesthesia patient received Ancef prior to .  No history of cardiac problems in the past.  A postoperative EKG was done and showed a left bundle branch block.  Patient asymptomatic.  Stat labs obtained including magnesium, TSH, BMP, and troponin.  Echocardiogram was ordered.  Hospitalist team consulted for recommendations.  Obstetrician also called maternal-fetal medicine and cardiology.  On repeat EKG at around 9:30 AM, patient was back in normal sinus rhythm.    Transient left bundle branch block  Left ventricular systolic function mildly reduced, EF 40 to 45%  Mild global hypokinesia of left ventricle  -- Patient was seen and examined.  Brenda denies any chest pain, palpitations, shortness of breath  -- Reviewed labs.  Initial and repeat troponin negative  -- Low magnesium at 1.5 (normal 1.7 - 2.3)  -- TSH wnl at 0.94    --- patient seen and examined today   -- reviewed telemetry strips   -- she did have a 2.8 sec pause around noon and has been tachycardic this am  -- Asymptomatic  -- Continue to monitor on telemetry  -- Zio patch prescribed  -- Sent a referral to outpatient cardiology    Etiology unclear.  There is significant hemodynamic changes during and towards the end of pregnancy, significant hormonal changes towards the end of pregnancy.  It is possible that changes in hemodynamics during surgery caused transient stress on the cardiovascular system.    No murmurs on exam, no valvular abnormalities on echocardiogram.  Vital signs within normal limits,  normotensive.    Hypomagnesemia   -- Replaced    History of gestational diabetes, diet-controlled      Lydia Blair MD  Hospitalist Service  [facility]  Securely message with [employer] (more info)  Text page via [employer] Paging/Directory   ______________________________________________________________________    Interval History   Patient was seen and examined.  Family is in the room.  She is feeling well  Denies chest pain, denies shortness of breath, denies palpitations      Physical Exam   Vital Signs: Temp: 98.2  F (36.8  C) Temp src: Oral BP: 126/79 Pulse: 108   Resp: 16 SpO2: 99 % O2 Device: None (Room air)    Weight: 160 lbs 3.2 oz    Constitutional: awake, alert, cooperative, no apparent distress, and appears stated age  Respiratory: No increased work of breathing, good air exchange, clear to auscultation bilaterally, no crackles or wheezing  Cardiovascular: Normal apical impulse, regular rate and rhythm, normal S1 and S2, no S3 or S4, and no murmur noted    Medical Decision Making       25 MINUTES SPENT BY ME on the date of service doing chart review, history, exam, documentation & further activities per the note.

## 2025-01-05 NOTE — PROGRESS NOTES
S: Discharge  to home, self-care, with cardiology follow up in place and will double check on appointment status with baby's follow up with Dr. Nielsen on 2025.      B: Patient had a  delivery with no complications. Baby boy, Shan Bacon, breastfeeding. Support person, René, present and supportive.     A: VSS, discontinued telemetry per provider today for discharge. Remains asymptomatic through entire hospital stay- hospitalist saw patient multiple times and cardiology was consulted due to patient's acute heart rhythm findings. Patient had ziopatch sent home to start and cardiology coordinator will be reaching out to patient this week for setting up outpatient cardiology visit. Patient and  are aware of this and know to expect set up. Patient thoroughly educated on s/s to watch for and knows to seek emergency care if she becomes symptomatic. Patient has a follow up visit for infant scheduled with Dr. Nielsen on 2025 and will ask for further assistance with cardiology set up at this time if she has not received a call yet. Patient's incision is clean, dry, and intact today- no further drainage noted. Afebrile. Flow remains light, no clots. Feels ready to discharge to home.     R:  Discharge instructions reviewed and questions answered.  Belongings gathered and returned to the patient. Agreed to follow up with cardiology and provider as stated above.     Nursing Discharge Checklist:    Pneumovax screened and given, if appropriate: N/A  Influenza vaccine screened and given, if appropriate: N/A  Staples removed (): N/A  Breast milk returned: YES  Hydrogel pads sent home:YES  Birth Certificate Done: YES  Public Health Referral Made: NO

## 2025-01-05 NOTE — PLAN OF CARE
Goal Outcome Evaluation:  S: Shift review  B:Brenda is a , day 2 post  birth.  A: Stable post-op, incision is clean, dry and intact, independent with mobility, pain control achieved with scheduled pain meds. Continues to show arrythmias on telemetry. Remains asymptomatic.Handles baby with confidence.  R: Continue with plan of care.

## 2025-01-05 NOTE — DISCHARGE SUMMARY
Obstetrical Discharge Summary:    Date of Admission: 1/3/25    Date of Discharge: 25    Hospital course:  This patient is a  29 year old female  2 Para 1001 who presented  electively  for repeat  section   and delivered a 7 pound 9 ounce     Male infant  .  During the  section her electrocardiogram changed to left bundle branch block and so she had an echocardiogram which showed a slightly low ejection fraction (40-45%) and mild global hypokinesia of the left ventricle but the troponins were normal and other labs including electrolytes were normal and her left bundle branch block resolved relatively soon after delivery and it did occur 1 other time during monitoring on telemetry the following day but resolved very soon after was noticed.  She was asymptomatic the entire time.  No dyspnea no chest pain.  It was felt that this could be evaluated as an outpatient.  There was consultation done with cardiology.. By the day of discharge she was eating and voiding well, ambulating well and tolerating her pain well. Exam on day of discharge revealed stable vital signs: /79   Pulse 108   Temp 98.3  F (36.8  C) (Oral)   Resp 16   LMP 2024 (Approximate)   SpO2 100%   Breastfeeding Unknown , chest was clear to auscultation, cardiac exam was normal, abdomen was soft and nontender, fundus was firm, and the  incision was clean ,dry and intact  and so I felt she could be safely discharged.    Discharge medications: see med rec form    Activity: No intercourse for 6 weeks. Limit lifting to 10 pounds for 6 weeks. No baths for 2 weeks- may shower anytime.    Followup: within 1 week for  incision check  and   In 6-8 weeks for postpartum check.   Recheck acutely if fever, heavy vaginal bleeding greater than menses, increased pain or any other concerns.    Also arrangements will be made to see the cardiologist within the next 2 weeks and further evaluation will be directed by  the cardiology team.    Discharge Diagnosis:   1.  Term pregnancy with a history of prior  section, request repeat low-transverse  section.    2.intermittent left bundle branch block-further evaluation is in process.    Procedures: 1.  Repeat low-transverse  section      2.  Echocardiogram    Complications: None     COY Pineda MD

## 2025-01-07 ENCOUNTER — MEDICAL CORRESPONDENCE (OUTPATIENT)
Dept: HEALTH INFORMATION MANAGEMENT | Facility: CLINIC | Age: 30
End: 2025-01-07

## 2025-01-07 ENCOUNTER — OFFICE VISIT (OUTPATIENT)
Dept: FAMILY MEDICINE | Facility: CLINIC | Age: 30
End: 2025-01-07
Payer: COMMERCIAL

## 2025-01-07 ENCOUNTER — ALLIED HEALTH/NURSE VISIT (OUTPATIENT)
Dept: CARDIOLOGY | Facility: CLINIC | Age: 30
End: 2025-01-07
Attending: FAMILY MEDICINE
Payer: COMMERCIAL

## 2025-01-07 VITALS
DIASTOLIC BLOOD PRESSURE: 84 MMHG | HEIGHT: 66 IN | TEMPERATURE: 97 F | RESPIRATION RATE: 16 BRPM | WEIGHT: 155.4 LBS | OXYGEN SATURATION: 100 % | BODY MASS INDEX: 24.98 KG/M2 | SYSTOLIC BLOOD PRESSURE: 120 MMHG | HEART RATE: 89 BPM

## 2025-01-07 DIAGNOSIS — I44.7 BUNDLE BRANCH BLOCK, LEFT: ICD-10-CM

## 2025-01-07 DIAGNOSIS — Z98.891 HISTORY OF C-SECTION: ICD-10-CM

## 2025-01-07 DIAGNOSIS — Z09 HOSPITAL DISCHARGE FOLLOW-UP: Primary | ICD-10-CM

## 2025-01-07 DIAGNOSIS — R93.1 ABNORMAL ECHOCARDIOGRAM: ICD-10-CM

## 2025-01-07 PROCEDURE — 99213 OFFICE O/P EST LOW 20 MIN: CPT | Performed by: FAMILY MEDICINE

## 2025-01-07 PROCEDURE — 93000 ELECTROCARDIOGRAM COMPLETE: CPT | Performed by: FAMILY MEDICINE

## 2025-01-07 ASSESSMENT — PAIN SCALES - GENERAL: PAINLEVEL_OUTOF10: NO PAIN (1)

## 2025-01-07 NOTE — PROGRESS NOTES
"  {PROVIDER CHARTING PREFERENCE:244083}    Vitor Gutierrez is a 29 year old, presenting for the following health issues:  Hospital F/U        1/7/2025     2:09 PM   Additional Questions   Roomed by Tamy TENA LPN   Accompanied by  and Son     HPI     {MA/LPN/RN Pre-Provider Visit Orders- hCG/UA/Strep (Optional):663874}    Hospital Follow-up Visit:    Hospital/Nursing Home/IP Rehab Facility: [facility]  Date of Admission: 1/3/25  Date of Discharge: 1/5/25  Reason(s) for Admission: Delivery  Was the patient in the ICU or did the patient experience delirium during hospitalization?  No  Do you have any other stressors you would like to discuss with your provider? Health Concerns- following up on cardiology    Problems taking medications regularly:  None  Medication changes since discharge: None  Problems adhering to non-medication therapy:  {:601623}    Summary of hospitalization:  {:578061}  Diagnostic Tests/Treatments reviewed.  Follow up needed: {:420052:}  Other Healthcare Providers Involved in Patient s Care:         {those currently involved after discharge:974673::\"None\"}  Update since discharge: {:420423} {TIP  Include information from family/caregivers, SNF, Care Coordination :644275}        Plan of care communicated with {:357797}     {Reference  Coding guidelines- Moderate Complexity F2F/Video within 7 - 14 days of discharge 7154109, High Complexity F2F/Video within 7 days 2969910 or esxvlf96 days 7446527 :910119}      {additonal problems for provider to add (Optional):812838}  {additonal problems for provider to add (Optional):237365}    {ROS Picklists (Optional):915178}      Objective    /84   Pulse 89   Temp 97  F (36.1  C) (Temporal)   Resp 16   Ht 1.676 m (5' 6\")   Wt 70.5 kg (155 lb 6.4 oz)   LMP 04/08/2024 (Approximate)   SpO2 100%   Breastfeeding Yes   BMI 25.08 kg/m    Body mass index is 25.08 kg/m .  Physical Exam   {Exam List " (Optional):887558}    {Diagnostic Test Results (Optional):187671}        Signed Electronically by: Petra Majano Mai, MD  {Email feedback regarding this note to [email or web address]   :886891}   "completed without interruption.  In fact, I was not notified about the arrhythmia until after the procedure.  Postprocedure EKG showed LBBB which lasted for about 90 minutes.  He was completely asymptomatic.  It was then self converted to sinus rhythm.  Happened one more time the next day that lasted for about 20 minutes.  Workup showed negative/normal troponin, magnesium, electrolytes, TSH and sed rate.  Echocardiogram showed showed mildly reduced left ventricular systolic function with ejection fraction of 40-45% with mild global hypokinesis of the left ventricle.  She has been doing well since discharged from the hospital.  No heart palpitation, CP, SOB, dyspnea or orthopnea.  No personal or family history of premature heart disease or cardiomyopathy.  No drug or excessive alcohol.  Cardiologist was consulted over the phone during the hospitalization who recommended 1 week Zio patch at discharge and then follow up with cardiology 2 weeks after discharge.    Pain is well-controlled with Tylenol or Motrin; not taking the Oxycodone.  Bleeding is minimal.  Breast-feeding has been going well.  No headache or dizziness.  No chest pain or shortness of breath.  No nausea, vomiting, diarrhea or constipation.  No fever or chills.  Denied being depressed, good support from  and family.  Breast-feeding is going well.  No concern of her safety or her ability to care for self and her children.        Review of Systems  Constitutional, HEENT, cardiovascular, pulmonary, gi and gu systems are negative, except as otherwise noted.      Objective    /84   Pulse 89   Temp 97  F (36.1  C) (Temporal)   Resp 16   Ht 1.676 m (5' 6\")   Wt 70.5 kg (155 lb 6.4 oz)   LMP 04/08/2024 (Approximate)   SpO2 100%   Breastfeeding Yes   BMI 25.08 kg/m    Body mass index is 25.08 kg/m .  Physical Exam   GENERAL: alert and no distress.  Speaking in full sentences.  EYES: Eyes grossly normal to inspection, PERRL and conjunctivae " and sclerae normal.  No nystagmus.  HENT: ear canals and TM's normal.  Nares are non-congested. Oropharynx is pink and moist. No tender with palpation to the sinuses.   NECK: Supple, no lymphadenopathy or thyromegaly.  No JV distention.  RESP: lungs clear to auscultation - no rales, rhonchi or wheezes  CV: regular rate and rhythm, normal S1 S2, no S3 or S4, no murmur, click or rub, no peripheral edema  ABDOMEN: soft, nontender, nondistended, no hepatosplenomegaly, no masses and bowel sounds normal.  No CVA tenderness.  MS: no gross musculoskeletal defects noted, no edema  SKIN: no suspicious lesions or rashes.  Lower abdominal incision from the  healing expected, no redness, drainage or was warmth to the touch.  NEURO: Normal strength and tone, mentation intact and speech normal  PSYCH: mentation appears normal, affect normal/bright    No results found for any visits on 25.    EKG:  NSR        Signed Electronically by: Petra Majano Mai, MD

## 2025-01-07 NOTE — PROGRESS NOTES
Brenda Bacon arrived here on 1/7/2025 3:54 PM for 3-7 Days  Zio monitor placement per ordering provider Petra Nielsen for the diagnosis LBBB.  Patient s skin was prepped per protocol. Dr. Giovani Graves is the supervising MD.  Zio monitor was placed.  Instructions were reviewed with and given to the patient.  Patient verbalized understanding of wear, troubleshooting and monitor return instructions.

## 2025-01-09 NOTE — PROGRESS NOTES
"Brenda RONA Casasd  Gender: female  : 1995  [address]  [facility] [address]  [phone] (home)   Medical Record: [id number]  Primary Care Provider: Petra Nielsen       [facility]   ?   Discharge Phone Call: Key Words/Key Times     How are you and the baby? good    How are feedings going? good    Voiding & Stooling? good    Any questions or concerns? no    Follow-up appointment? \"Had an appt\"      We want to provide excellent care here at [facility]. Do you have any feedback for us that would help us improve?     Call back COMMENTS:   \"Everything went really well, thank you\"      Attempted Calls:   _2025 1025 callback completed per HEIKE Nunes RN________     __________    "

## 2025-01-12 PROBLEM — R93.1 ABNORMAL ECHOCARDIOGRAM: Status: ACTIVE | Noted: 2025-01-12

## 2025-01-12 PROBLEM — Z36.89 ENCOUNTER FOR TRIAGE IN PREGNANT PATIENT: Status: RESOLVED | Noted: 2024-12-31 | Resolved: 2025-01-12

## 2025-01-13 ENCOUNTER — ANCILLARY PROCEDURE (OUTPATIENT)
Dept: GENERAL RADIOLOGY | Facility: CLINIC | Age: 30
End: 2025-01-13
Attending: PHYSICIAN ASSISTANT
Payer: COMMERCIAL

## 2025-01-13 ENCOUNTER — OFFICE VISIT (OUTPATIENT)
Dept: URGENT CARE | Facility: URGENT CARE | Age: 30
End: 2025-01-13
Payer: COMMERCIAL

## 2025-01-13 VITALS
OXYGEN SATURATION: 97 % | SYSTOLIC BLOOD PRESSURE: 131 MMHG | WEIGHT: 155 LBS | BODY MASS INDEX: 25.02 KG/M2 | DIASTOLIC BLOOD PRESSURE: 88 MMHG | RESPIRATION RATE: 16 BRPM | TEMPERATURE: 97.1 F | HEART RATE: 89 BPM

## 2025-01-13 DIAGNOSIS — R10.32 LLQ ABDOMINAL PAIN: Primary | ICD-10-CM

## 2025-01-13 DIAGNOSIS — R10.32 LLQ ABDOMINAL PAIN: ICD-10-CM

## 2025-01-13 LAB
ALBUMIN UR-MCNC: NEGATIVE MG/DL
APPEARANCE UR: CLEAR
BASOPHILS # BLD AUTO: 0.1 10E3/UL (ref 0–0.2)
BASOPHILS NFR BLD AUTO: 0 %
BILIRUB UR QL STRIP: NEGATIVE
COLOR UR AUTO: YELLOW
EOSINOPHIL # BLD AUTO: 0.5 10E3/UL (ref 0–0.7)
EOSINOPHIL NFR BLD AUTO: 4 %
ERYTHROCYTE [DISTWIDTH] IN BLOOD BY AUTOMATED COUNT: 12.7 % (ref 10–15)
GLUCOSE UR STRIP-MCNC: NEGATIVE MG/DL
HCT VFR BLD AUTO: 39 % (ref 35–47)
HGB BLD-MCNC: 13.1 G/DL (ref 11.7–15.7)
HGB UR QL STRIP: ABNORMAL
IMM GRANULOCYTES # BLD: 0 10E3/UL
IMM GRANULOCYTES NFR BLD: 0 %
KETONES UR STRIP-MCNC: NEGATIVE MG/DL
LEUKOCYTE ESTERASE UR QL STRIP: NEGATIVE
LYMPHOCYTES # BLD AUTO: 3.5 10E3/UL (ref 0.8–5.3)
LYMPHOCYTES NFR BLD AUTO: 29 %
MCH RBC QN AUTO: 31.6 PG (ref 26.5–33)
MCHC RBC AUTO-ENTMCNC: 33.6 G/DL (ref 31.5–36.5)
MCV RBC AUTO: 94 FL (ref 78–100)
MONOCYTES # BLD AUTO: 0.6 10E3/UL (ref 0–1.3)
MONOCYTES NFR BLD AUTO: 5 %
NEUTROPHILS # BLD AUTO: 7.4 10E3/UL (ref 1.6–8.3)
NEUTROPHILS NFR BLD AUTO: 62 %
NITRATE UR QL: NEGATIVE
PH UR STRIP: 6.5 [PH] (ref 5–7)
PLATELET # BLD AUTO: 341 10E3/UL (ref 150–450)
RBC # BLD AUTO: 4.15 10E6/UL (ref 3.8–5.2)
RBC #/AREA URNS AUTO: ABNORMAL /HPF
SP GR UR STRIP: 1.01 (ref 1–1.03)
SQUAMOUS #/AREA URNS AUTO: ABNORMAL /LPF
UROBILINOGEN UR STRIP-ACNC: 0.2 E.U./DL
WBC # BLD AUTO: 12 10E3/UL (ref 4–11)
WBC #/AREA URNS AUTO: ABNORMAL /HPF

## 2025-01-13 PROCEDURE — 36415 COLL VENOUS BLD VENIPUNCTURE: CPT | Performed by: PHYSICIAN ASSISTANT

## 2025-01-13 PROCEDURE — 99214 OFFICE O/P EST MOD 30 MIN: CPT | Performed by: PHYSICIAN ASSISTANT

## 2025-01-13 PROCEDURE — 85025 COMPLETE CBC W/AUTO DIFF WBC: CPT | Performed by: PHYSICIAN ASSISTANT

## 2025-01-13 PROCEDURE — 74019 RADEX ABDOMEN 2 VIEWS: CPT | Mod: TC | Performed by: RADIOLOGY

## 2025-01-13 PROCEDURE — 81001 URINALYSIS AUTO W/SCOPE: CPT | Performed by: PHYSICIAN ASSISTANT

## 2025-01-13 RX ORDER — ACETAMINOPHEN 500 MG
500-1000 TABLET ORAL EVERY 6 HOURS PRN
COMMUNITY

## 2025-01-14 NOTE — PROGRESS NOTES
"  Assessment & Plan     Q abdominal pain  Abdominal exam is reassuring. Pain improved when patient was supine, possibly was too active after surgery. X-ray shows moderate stool. Continue with miralax, can increase to 1 capful two times daily. Continue with tylenol/ibuprofen. Avoid lifting. Encouraged rest. Monitor symptoms closely. Discussed in detail symptoms that would warrant emergent evaluation in the ED. Patient agrees with plan and will follow up as needed.      - XR Abdomen 2 Views; Future  - UA Macroscopic with reflex to Microscopic and Culture - Lab Collect; Future  - CBC with platelets and differential; Future  - UA Macroscopic with reflex to Microscopic and Culture - Lab Collect  - CBC with platelets and differential  - UA Microscopic with Reflex to Culture          BMI  Estimated body mass index is 25.02 kg/m  as calculated from the following:    Height as of 25: 1.676 m (5' 6\").    Weight as of this encounter: 70.3 kg (155 lb).             Return in about 2 days (around 1/15/2025), or if symptoms worsen or fail to improve.                Subjective   Chief Complaint   Patient presents with    Pain     Above incision site on left side after . Started little discomfort last night but consistent since about 2pm today. Says feels a little better if puts pressure on it.          HPI     Abdominal pain    Onset of symptoms was 1 day(s) ago.  Course of illness is same.    Severity moderate  Current and Associated symptoms: left lower abdominal pain, rated 7-8/10  Treatment measures tried include Tylenol/Ibuprofen.  Predisposing factors include post-op day 10 from .                      Objective    /88   Pulse 89   Temp 97.1  F (36.2  C) (Tympanic)   Resp 16   Wt 70.3 kg (155 lb)   LMP 2024 (Approximate)   SpO2 97%   Breastfeeding Yes   BMI 25.02 kg/m    Body mass index is 25.02 kg/m .    Physical Exam  Constitutional:       General: She is not in acute " distress.  Abdominal:      General: Bowel sounds are normal.      Palpations: Abdomen is soft.      Tenderness: There is abdominal tenderness in the left lower quadrant. There is no guarding or rebound.          Comments: Incision is healing, there is mild irritation but no tenderness with palpation around incision    Neurological:      Mental Status: She is alert.            Xray - Reviewed and interpreted by me.  Large amount of stool     CBC- WBC is 12; otherwise within normal limits   UA- within normal limits     Signed Electronically by: Idania Agudelo PA-C

## 2025-01-16 ENCOUNTER — TELEPHONE (OUTPATIENT)
Dept: FAMILY MEDICINE | Facility: CLINIC | Age: 30
End: 2025-01-16
Payer: COMMERCIAL

## 2025-01-16 DIAGNOSIS — Z98.891 S/P C-SECTION: Primary | ICD-10-CM

## 2025-01-16 RX ORDER — OXYCODONE HYDROCHLORIDE 5 MG/1
5 TABLET ORAL EVERY 6 HOURS PRN
Qty: 12 TABLET | Refills: 0 | Status: SHIPPED | OUTPATIENT
Start: 2025-01-16

## 2025-01-16 NOTE — TELEPHONE ENCOUNTER
Talked to patient over the phone.  Been having more incisional pain.  Tylenol and Motrin are not working.  Drainage is resolving.  No antibiotic.  No fever or chills.    Did not want to go to the emergency room.  Feel okay to wait until tomorrow morning.  I am not in the clinic today and the clinic is about to be closed.    Encouraged to keep the area clean and dry.  Continue with antibiotic.  Continue with Tylenol or Motrin as needed for pain.  Oxycodone was sent to the pharmacy today to be taken for severe pain.  Follow-up in the morning at 8:00.  Instructed to go to the ER tonight if the pain is significantly worse, persistent or if any concern.    She felt comfortable with the plan. All of her questions were answered    Petra Nielsen MD.

## 2025-01-17 ENCOUNTER — HOSPITAL ENCOUNTER (OUTPATIENT)
Dept: CT IMAGING | Facility: CLINIC | Age: 30
Discharge: HOME | End: 2025-01-17
Attending: FAMILY MEDICINE | Admitting: FAMILY MEDICINE
Payer: COMMERCIAL

## 2025-01-17 ENCOUNTER — OFFICE VISIT (OUTPATIENT)
Dept: FAMILY MEDICINE | Facility: CLINIC | Age: 30
End: 2025-01-17
Payer: COMMERCIAL

## 2025-01-17 VITALS
OXYGEN SATURATION: 97 % | SYSTOLIC BLOOD PRESSURE: 122 MMHG | DIASTOLIC BLOOD PRESSURE: 80 MMHG | HEIGHT: 66 IN | BODY MASS INDEX: 24.33 KG/M2 | TEMPERATURE: 98.1 F | HEART RATE: 113 BPM | WEIGHT: 151.4 LBS | RESPIRATION RATE: 25 BRPM

## 2025-01-17 DIAGNOSIS — Z98.891 HISTORY OF C-SECTION: ICD-10-CM

## 2025-01-17 DIAGNOSIS — R10.2 PELVIC PAIN IN FEMALE: Primary | ICD-10-CM

## 2025-01-17 DIAGNOSIS — R03.0 ELEVATED BP WITHOUT DIAGNOSIS OF HYPERTENSION: ICD-10-CM

## 2025-01-17 DIAGNOSIS — R10.2 PELVIC PAIN IN FEMALE: ICD-10-CM

## 2025-01-17 LAB
ANION GAP SERPL CALCULATED.3IONS-SCNC: 10 MMOL/L (ref 7–15)
BASOPHILS # BLD AUTO: 0.1 10E3/UL (ref 0–0.2)
BASOPHILS NFR BLD AUTO: 1 %
BUN SERPL-MCNC: 11.3 MG/DL (ref 6–20)
CALCIUM SERPL-MCNC: 8.9 MG/DL (ref 8.8–10.4)
CHLORIDE SERPL-SCNC: 105 MMOL/L (ref 98–107)
CREAT SERPL-MCNC: 0.59 MG/DL (ref 0.51–0.95)
EGFRCR SERPLBLD CKD-EPI 2021: >90 ML/MIN/1.73M2
EOSINOPHIL # BLD AUTO: 0.3 10E3/UL (ref 0–0.7)
EOSINOPHIL NFR BLD AUTO: 3 %
ERYTHROCYTE [DISTWIDTH] IN BLOOD BY AUTOMATED COUNT: 12.9 % (ref 10–15)
GLUCOSE SERPL-MCNC: 90 MG/DL (ref 70–99)
HCO3 SERPL-SCNC: 22 MMOL/L (ref 22–29)
HCT VFR BLD AUTO: 43.2 % (ref 35–47)
HGB BLD-MCNC: 14.6 G/DL (ref 11.7–15.7)
IMM GRANULOCYTES # BLD: 0 10E3/UL
IMM GRANULOCYTES NFR BLD: 0 %
LYMPHOCYTES # BLD AUTO: 2.3 10E3/UL (ref 0.8–5.3)
LYMPHOCYTES NFR BLD AUTO: 20 %
MCH RBC QN AUTO: 31.4 PG (ref 26.5–33)
MCHC RBC AUTO-ENTMCNC: 33.8 G/DL (ref 31.5–36.5)
MCV RBC AUTO: 93 FL (ref 78–100)
MONOCYTES # BLD AUTO: 0.4 10E3/UL (ref 0–1.3)
MONOCYTES NFR BLD AUTO: 3 %
NEUTROPHILS # BLD AUTO: 8.4 10E3/UL (ref 1.6–8.3)
NEUTROPHILS NFR BLD AUTO: 73 %
NRBC # BLD AUTO: 0 10E3/UL
NRBC BLD AUTO-RTO: 0 /100
PLATELET # BLD AUTO: 395 10E3/UL (ref 150–450)
POTASSIUM SERPL-SCNC: 4.4 MMOL/L (ref 3.4–5.3)
RBC # BLD AUTO: 4.65 10E6/UL (ref 3.8–5.2)
SODIUM SERPL-SCNC: 137 MMOL/L (ref 135–145)
WBC # BLD AUTO: 11.5 10E3/UL (ref 4–11)

## 2025-01-17 PROCEDURE — 74177 CT ABD & PELVIS W/CONTRAST: CPT

## 2025-01-17 PROCEDURE — 80048 BASIC METABOLIC PNL TOTAL CA: CPT | Performed by: FAMILY MEDICINE

## 2025-01-17 PROCEDURE — 36415 COLL VENOUS BLD VENIPUNCTURE: CPT | Performed by: FAMILY MEDICINE

## 2025-01-17 PROCEDURE — 250N000011 HC RX IP 250 OP 636: Performed by: FAMILY MEDICINE

## 2025-01-17 PROCEDURE — 250N000009 HC RX 250: Performed by: FAMILY MEDICINE

## 2025-01-17 PROCEDURE — 85025 COMPLETE CBC W/AUTO DIFF WBC: CPT | Performed by: FAMILY MEDICINE

## 2025-01-17 RX ORDER — IOPAMIDOL 755 MG/ML
500 INJECTION, SOLUTION INTRAVASCULAR ONCE
Status: COMPLETED | OUTPATIENT
Start: 2025-01-17 | End: 2025-01-17

## 2025-01-17 RX ADMIN — SODIUM CHLORIDE 60 ML: 9 INJECTION, SOLUTION INTRAVENOUS at 10:10

## 2025-01-17 RX ADMIN — IOPAMIDOL 74 ML: 755 INJECTION, SOLUTION INTRAVENOUS at 10:11

## 2025-01-17 ASSESSMENT — PAIN SCALES - GENERAL: PAINLEVEL_OUTOF10: MODERATE PAIN (6)

## 2025-01-17 NOTE — PROGRESS NOTES
"  {PROVIDER CHARTING PREFERENCE:845987}    Subjective   Brenda is a 29 year old, presenting for the following health issues:  Wound Check      1/17/2025     8:16 AM   Additional Questions   Roomed by Preethi   Accompanied by sister         1/17/2025     8:16 AM   Patient Reported Additional Medications   Patient reports taking the following new medications none     Wound Check    History of Present Illness       Reason for visit:  Csection incision issues  Symptom onset:  3-7 days ago  Symptoms include:  Stabbing pain lower left abdomen, weepy until yesterday, rash, patient believes it is open on the left side  Symptom intensity:  Severe  Symptom progression:  Worsening  Had these symptoms before:  No  What makes it worse:  Sitting, anything using abdomen muscles  What makes it better:  Laying on side and not moving    She eats 2-3 servings of fruits and vegetables daily.She consumes 0 sweetened beverage(s) daily.She exercises with enough effort to increase her heart rate 30 to 60 minutes per day.  She exercises with enough effort to increase her heart rate 3 or less days per week.   She is taking medications regularly.       {MA/LPN/RN Pre-Provider Visit Orders- hCG/UA/Strep (Optional):446076}  {SUPERLIST (Optional):179183}  {additonal problems for provider to add (Optional):000833}    {ROS Picklists (Optional):245863}      Objective    BP (!) 140/105 (BP Location: Right arm, Patient Position: Sitting, Cuff Size: Adult Regular)   Pulse 113   Temp 98.1  F (36.7  C) (Temporal)   Resp 25   Ht 1.676 m (5' 6\")   Wt 68.7 kg (151 lb 6.4 oz)   LMP 04/08/2024 (Approximate)   SpO2 97%   Breastfeeding Yes   BMI 24.44 kg/m    Body mass index is 24.44 kg/m .  Physical Exam   {Exam List (Optional):184474}    {Diagnostic Test Results (Optional):582021}        Signed Electronically by: Petra Majano Mai, MD  {Email feedback regarding this note to [email or web address]   :296215}  " reassured.  Will continue to monitor.  Activity as tolerated.  Continue with the wound care as well.  Symptoms that it need to be seen or call in discussed.  Continue with Tylenol, Motrin or Oxycodone as needed for pain.    Her blood pressure also in the higher side at home.  No history of high blood pressure; she is being evaluated for asymptomatic LBBB with abnormal echocardiogram.  Her elevated blood pressure is likely it is due to pain.  She did have preeclamptic symptoms.  Blood pressure cuff was prescribed today and she was encouraged her to monitor blood pressure couple times a day when she calm, relaxed and rested.  She was instructed to let me know if the BP is >140/90.  She needs to be seen immediately or go to the emergency room if BP>140/90 with preeclamptic symptoms or is >160/110 x 2, 20 minutes apart.  Preeclamptic symptoms also discussed.  She felt comfortable with the plan.      Vitor Gutierrez is a 29 year old, presenting for the following health issues:  Wound Check      1/17/2025     8:16 AM   Additional Questions   Roomed by Preethi   Accompanied by sister         1/17/2025     8:16 AM   Patient Reported Additional Medications   Patient reports taking the following new medications none     Wound Check    History of Present Illness       Reason for visit:  Csection incision issues  Symptom onset:  3-7 days ago  Symptoms include:  Stabbing pain lower left abdomen, weepy until yesterday, rash, patient believes it is open on the left side  Symptom intensity:  Severe  Symptom progression:  Worsening  Had these symptoms before:  No  What makes it worse:  Sitting, anything using abdomen muscles  What makes it better:  Laying on side and not moving    She eats 2-3 servings of fruits and vegetables daily.She consumes 0 sweetened beverage(s) daily.She exercises with enough effort to increase her heart rate 30 to 60 minutes per day.  She exercises with enough effort to increase her heart rate 3 or less  "days per week.   She is taking medications regularly.     Brenda is here today with her sister for increased pelvic pain and wound check.  She had repeated  with no complication about 2 weeks ago.  She was seen 10 days ago for follow-up, the wound is not infected but there was rash developed around the incision that was thought due to contact dermatitis.  She was treated with Lotrisone and the rash went away quickly.  Since yesterday, she has been having a lot of pain in in the left groin area.  The pain is worse with certain movement activity, especially with prolonged sitting.  Oxycodone did not touch her pain; she did not need oxycodone until yesterday.  No fever.  Bleeding is minimal.  No diarrhea or constipation.         Review of Systems  Constitutional, HEENT, cardiovascular, pulmonary, gi and gu systems are negative, except as otherwise noted.      Objective    /80   Pulse 113   Temp 98.1  F (36.7  C) (Temporal)   Resp 25   Ht 1.676 m (5' 6\")   Wt 68.7 kg (151 lb 6.4 oz)   LMP 2024 (Approximate)   SpO2 97%   Breastfeeding Yes   BMI 24.44 kg/m    Body mass index is 24.44 kg/m .  Physical Exam   GENERAL: alert and no distress  RESP: lungs clear to auscultation - no rales, rhonchi or wheezes  CV: regular rate and rhythm, no murmur, no peripheral edema  ABDOMEN: soft, nondistended, no palpable masses over the renal bowel sound.  No CVA tenderness.  Tender with guarding but no rebound with palpation to left groin area.  MS: no gross musculoskeletal defects noted, no edema.  Hips and lower spine exam were normal.  SKIN: The rash along the incision in the lower abdomen is resolving.  The incision is dry and clean, no redness or drainage.  PSYCH: mentation appears normal, affect normal/bright    Results for orders placed or performed during the hospital encounter of 25   CT Abdomen Pelvis w Contrast     Status: None    Narrative    EXAM: CT ABDOMEN PELVIS W CONTRAST  LOCATION: [facility]  DATE: 2025    INDICATION: Pelvic pain in female, history of .  COMPARISON: None.  TECHNIQUE: CT scan of the abdomen and pelvis was performed following injection of IV contrast. Multiplanar reformats were obtained. Dose reduction techniques were used.  CONTRAST: ISOVUE 370, 74 mL.    FINDINGS:   LOWER CHEST: Normal.    HEPATOBILIARY: No acute abnormality. Hypodense indeterminate nodule anterior left liver is 9 mm series 23 image 40. Enhancing nodular observation posterior right liver is 27 mm image 35. Gallbladder unremarkable.    PANCREAS: Normal.    SPLEEN: Normal.    ADRENAL GLANDS: Normal.    KIDNEYS/BLADDER: No stones or hydronephrosis. Unremarkable kidneys and bladder.    BOWEL: Moderate stool. No evidence for appendicitis. No acute inflammation of the bowel.    LYMPH NODES: Normal.    VASCULATURE: Normal.    PELVIC ORGANS: Enlarged and edematous uterus. There is prominent distention of the endometrial cavity measuring 5.2 cm series 3 image 56. There is a nodular increased density focal structure at the inferior endometrial cavity measuring 2.4 cm image 171.   No adnexal lesion. Trace pelvic fluid.    MUSCULOSKELETAL: There is edema and some skin thickening along the low ventral abdominal wall compatible with recent surgery. A few tiny foci of increased density suggesting small blood products at the deep subcutaneous fat but no large hematoma can be   seen. No significant bony abnormality.      Impression    IMPRESSION:   1.  Edematous uterus consistent with postpartum state. Prominent distention of the endometrial cavity is seen. Focal increased density region at the inferior endometrium identified. These findings could represent blood products. Retained products of   conception remains in the differential.  2.  No other acute abnormality identified. Subcutaneous edema and trace blood products at the low ventral abdominal wall.  3.  Indeterminate  hypodense nodule at the left liver. An enhancing nodule at the right liver is also seen that could be a hemangioma or vascular lesion. Suggest correlation with nonurgent liver MRI.   Results for orders placed or performed in visit on 01/17/25   Basic metabolic panel  (Ca, Cl, CO2, Creat, Gluc, K, Na, BUN)     Status: Normal   Result Value Ref Range    Sodium 137 135 - 145 mmol/L    Potassium 4.4 3.4 - 5.3 mmol/L    Chloride 105 98 - 107 mmol/L    Carbon Dioxide (CO2) 22 22 - 29 mmol/L    Anion Gap 10 7 - 15 mmol/L    Urea Nitrogen 11.3 6.0 - 20.0 mg/dL    Creatinine 0.59 0.51 - 0.95 mg/dL    GFR Estimate >90 >60 mL/min/1.73m2    Calcium 8.9 8.8 - 10.4 mg/dL    Glucose 90 70 - 99 mg/dL   CBC with platelets and differential     Status: Abnormal   Result Value Ref Range    WBC Count 11.5 (H) 4.0 - 11.0 10e3/uL    RBC Count 4.65 3.80 - 5.20 10e6/uL    Hemoglobin 14.6 11.7 - 15.7 g/dL    Hematocrit 43.2 35.0 - 47.0 %    MCV 93 78 - 100 fL    MCH 31.4 26.5 - 33.0 pg    MCHC 33.8 31.5 - 36.5 g/dL    RDW 12.9 10.0 - 15.0 %    Platelet Count 395 150 - 450 10e3/uL    % Neutrophils 73 %    % Lymphocytes 20 %    % Monocytes 3 %    % Eosinophils 3 %    % Basophils 1 %    % Immature Granulocytes 0 %    NRBCs per 100 WBC 0 <1 /100    Absolute Neutrophils 8.4 (H) 1.6 - 8.3 10e3/uL    Absolute Lymphocytes 2.3 0.8 - 5.3 10e3/uL    Absolute Monocytes 0.4 0.0 - 1.3 10e3/uL    Absolute Eosinophils 0.3 0.0 - 0.7 10e3/uL    Absolute Basophils 0.1 0.0 - 0.2 10e3/uL    Absolute Immature Granulocytes 0.0 <=0.4 10e3/uL    Absolute NRBCs 0.0 10e3/uL   CBC with platelets and differential     Status: Abnormal    Narrative    The following orders were created for panel order CBC with platelets and differential.  Procedure                               Abnormality         Status                     ---------                               -----------         ------                     CBC with platelets and d...[[id number]]  Abnormal             Final result                 Please view results for these tests on the individual orders.           Signed Electronically by: Petra Majano Mai, MD

## 2025-01-23 LAB — CV ZIO PRELIM RESULTS: NORMAL

## 2025-01-26 LAB — CV ZIO PRELIM RESULTS: NORMAL

## 2025-02-06 NOTE — PROGRESS NOTES
CARDIOLOGY CONSULT    REASON FOR CONSULT: cardiomyopathy    PRIMARY CARE PHYSICIAN:  Petra Majano Mai    HISTORY OF PRESENT ILLNESS:  29-year-old female seen for cardiomyopathy.    She has had 2 pregnancies.  Her first child is about 2 years old and her first pregnancy was completely normal, no issues.  She did well during her second pregnancy as well, she denied any palpitations, unusual weight gain, edema, or other issues.  She had gestational diabetes with the only issue.    2025 patient had elective  with spinal anesthesia.  Postop EKG showed left bundle branch block, possible junctional rhythm.  30 minutes later rhythm was sinus with a narrow QRS.    Echo 2025 (done about 3 hours after ) showed EF 40 to 45% with mild global hypokinesis, normal RV, no valve disease    7-day ZIO monitor 2025 showed sinus rhythm, average rate 86, rare ectopy, narrow QRS.    She feels completely back to normal.  She has no lightheadedness, dizziness, chest pain, edema, or shortness of breath.  She is breast-feeding her .  Overall she is doing very well.    PAST MEDICAL HISTORY:  Past Medical History:   Diagnosis Date    Cat-scratch disease 2004    Lyme disease     also cat scratch disease       MEDICATIONS:  Current Outpatient Medications   Medication Sig Dispense Refill    acetaminophen (TYLENOL) 500 MG tablet Take 500-1,000 mg by mouth every 6 hours as needed for mild pain.      clotrimazole-betamethasone (LOTRISONE) 1-0.05 % external cream Apply topically 2 times daily as needed (rash). Stop the hydrocortisone cream when you are using this cream. 45 g 0    ibuprofen (ADVIL/MOTRIN) 600 MG tablet Take 1 tablet (600 mg) by mouth every 6 hours as needed for moderate pain. 60 tablet 1    oxyCODONE (ROXICODONE) 5 MG tablet Take 1 tablet (5 mg) by mouth every 6 hours as needed for severe pain. 12 tablet 0    Prenatal Vit-Fe Fumarate-FA (PRENATAL MULTIVITAMIN W/IRON) 27-0.8 MG tablet  Take 1 tablet by mouth daily       No current facility-administered medications for this visit.       ALLERGIES:  Allergies   Allergen Reactions    No Known Allergies        SOCIAL HISTORY:  I have reviewed this patient's social history and updated it with pertinent information if needed. Brenda Bacon  reports that she has never smoked. She has never used smokeless tobacco. She reports that she does not currently use alcohol. She reports that she does not use drugs.    FAMILY HISTORY:  I have reviewed this patient's family history and updated it with pertinent information if needed.   Family History   Problem Relation Age of Onset    Hip dysplasia Mother     Hypertension Father     Hyperlipidemia Father     No Known Problems Sister     No Known Problems Maternal Grandmother     Asthma Paternal Grandmother     Chronic Obstructive Pulmonary Disease Paternal Grandmother     Diabetes Paternal Grandfather     Heart Disease Paternal Grandfather          of MI at 71    Hip dysplasia Son         left       REVIEW OF SYSTEMS:  Constitutional:  No weight loss, fever, chills  HEENT:  Eyes:  No visual loss, blurred vision, double vision or yellow sclerae. No hearing loss, sneezing, congestion, runny nose or sore throat.  Skin:  No rash or itching.  Cardiovascular: per HPI  Respiratory: per HPI  GI:  No anorexia, nausea, vomiting or diarrhea. No abdominal pain or blood.  :  No dysurea, hematuria  Neurologic:  No headache, paralysis, ataxia, numbness or tingling in the extremities. No change in bowel or bladder control.  Musculoskeletal:  No muscle pain  Hematologic:  No bleeding or bruising.  Lymphatics:  No enlarged nodes. No history of splenectomy.  Endocrine:  No reports of sweating, cold or heat intolerance. No polyuria or polydipsia.  Allergies:  No history of asthma, hives, eczema or rhinitis.    PHYSICAL EXAM:  BP (!) 134/92 (BP Location: Right arm, Patient Position: Sitting, Cuff Size: Adult Regular)   Pulse  "78   Resp 16   Ht 1.676 m (5' 6\")   Wt 65 kg (143 lb 4.8 oz)   LMP 2024 (Approximate)   SpO2 99%   Breastfeeding Yes   BMI 23.13 kg/m        Constitutional: awake, alert, no distress  Eyes: PERRL, sclera nonicteric  ENT: trachea midline  Respiratory: Lungs clear  Cardiovascular: Regular rate and rhythm, no murmurs  GI: nondistended, nontender, bowel sounds present  Lymph/Hematologic: no lymphadenopathy  Skin: dry, no rash  Musculoskeletal: good muscle tone, strength 5/5 in upper and lower extremities  Neurologic: no focal deficits  Neuropsychiatric: appropriate affact    DATA:  Labs:     Recent Labs   Lab Test 17  0813   CHOL 189   HDL 61   *   TRIG 59     2025: NT proBNP 126, serial troponins negative    EKG January 3 9:17 AM: Possible junctional rhythm with left bundle branch block.  January 3 9:40 AM: Sinus rhythm, rate 59, narrow QRS  : Sinus rhythm, rate 70, narrow QRS    ASSESSMENT:  29-year-old female seen for cardiomyopathy.  She has a mild cardiomyopathy.  She had no symptoms with either pregnancy, specifically no heart failure symptoms or fluid retention.  She has been asymptomatic from a cardiac perspective since her delivery about 5 weeks ago.  She had what appeared to be a short junctional run during her , suspect this was anesthesia or stress-induced, possibly vagal.  Her Zio monitor was normal.    We mostly talked about the mild cardiomyopathy today.  It is unknown if this is chronic or more acute.  Echo was done the same day as her , unclear if this was some stress from that day or potentially a peripartum cardiomyopathy.  Limited echo will be done to reassess ejection fraction.  If it is still down, will recommend cardiac MRI for further evaluation.  She would benefit from medication as well, but is breast-feeding.  Generally metoprolol has been deemed safe with breast-feeding.  She is not planning to have anymore children and her  " is scheduled for a vasectomy.  We did briefly talk about the risk of cardiomyopathy and subsequent pregnancies if this was thought to be peripartum.    Generally speaking for peripartum cardiomyopathy with recurrent pregnancies, there is limited data and very small sample size in the literature.  Ejection fraction typically decreases a little.  The risk of a more significant drop in ejection fraction with CHF is around 20-30%.  The best predictor is the prepregnancy EF.  Recommend echo at the end of the second trimester, before delivery, predischarge, and one month post delivery.  NT proBNP will be checked periodically.     RECOMMENDATIONS:  1.  Mild cardiomyopathy, possible peripartum  -Limited echo, if ejection fraction still reduced, then cardiac MRI and discuss medical treatment    Follow-up based on test results.    Avila Covarrubias MD  Cardiology - [facility]  Pager:  [phone]  Text Page  February 11, 2025

## 2025-02-11 ENCOUNTER — OFFICE VISIT (OUTPATIENT)
Dept: CARDIOLOGY | Facility: CLINIC | Age: 30
End: 2025-02-11
Payer: COMMERCIAL

## 2025-02-11 VITALS
SYSTOLIC BLOOD PRESSURE: 134 MMHG | WEIGHT: 143.3 LBS | HEART RATE: 78 BPM | DIASTOLIC BLOOD PRESSURE: 92 MMHG | RESPIRATION RATE: 16 BRPM | HEIGHT: 66 IN | BODY MASS INDEX: 23.03 KG/M2 | OXYGEN SATURATION: 99 %

## 2025-02-11 DIAGNOSIS — I42.8 OTHER CARDIOMYOPATHY (H): Primary | ICD-10-CM

## 2025-02-11 DIAGNOSIS — I44.7 BUNDLE BRANCH BLOCK, LEFT: ICD-10-CM

## 2025-02-11 NOTE — PATIENT INSTRUCTIONS
Your echo, or heart ultrasound, in January showed that the pumping function of your heart is mildly reduced.  Your heart function, or ejection fraction, was 40 to 45%, normal is typically 55 to 60%.  We cannot tell from the echo exactly why the heart function is down a little.  We cannot tell if this was induced by pregnancy or if this was pre-existing going into the pregnancy.    This could be a peripartum cardiomyopathy.  This is a fairly rare condition where the heart muscle can weaken in the later stages of pregnancy or shortly after delivery.  I would like to repeat another echo to reassess the heart function.    If the heart function remains down, there are some medications that are typically given that can help the heart improved, however if breast-feeding and with possibility of another pregnancy, this does limit medication options somewhat.    Your heart monitor was normal.

## 2025-02-11 NOTE — LETTER
2025    Petra Majano Mai, MD  [address] [place] [address]    RE: Brenda Bacon       Dear Colleague,     I had the pleasure of seeing Brenda Bacon in the [facility].  CARDIOLOGY CONSULT    REASON FOR CONSULT: cardiomyopathy    PRIMARY CARE PHYSICIAN:  Petra Majano Mai    HISTORY OF PRESENT ILLNESS:  29-year-old female seen for cardiomyopathy.    She has had 2 pregnancies.  Her first child is about 2 years old and her first pregnancy was completely normal, no issues.  She did well during her second pregnancy as well, she denied any palpitations, unusual weight gain, edema, or other issues.  She had gestational diabetes with the only issue.    2025 patient had elective  with spinal anesthesia.  Postop EKG showed left bundle branch block, possible junctional rhythm.  30 minutes later rhythm was sinus with a narrow QRS.    Echo 2025 (done about 3 hours after ) showed EF 40 to 45% with mild global hypokinesis, normal RV, no valve disease    7-day ZIO monitor 2025 showed sinus rhythm, average rate 86, rare ectopy, narrow QRS.    She feels completely back to normal.  She has no lightheadedness, dizziness, chest pain, edema, or shortness of breath.  She is breast-feeding her .  Overall she is doing very well.    PAST MEDICAL HISTORY:  Past Medical History:   Diagnosis Date     Cat-scratch disease 2004     Lyme disease     also cat scratch disease       MEDICATIONS:  Current Outpatient Medications   Medication Sig Dispense Refill     acetaminophen (TYLENOL) 500 MG tablet Take 500-1,000 mg by mouth every 6 hours as needed for mild pain.       clotrimazole-betamethasone (LOTRISONE) 1-0.05 % external cream Apply topically 2 times daily as needed (rash). Stop the hydrocortisone cream when you are using this cream. 45 g 0     ibuprofen (ADVIL/MOTRIN) 600 MG tablet Take 1 tablet (600 mg) by mouth every 6 hours as needed for moderate pain.  60 tablet 1     oxyCODONE (ROXICODONE) 5 MG tablet Take 1 tablet (5 mg) by mouth every 6 hours as needed for severe pain. 12 tablet 0     Prenatal Vit-Fe Fumarate-FA (PRENATAL MULTIVITAMIN W/IRON) 27-0.8 MG tablet Take 1 tablet by mouth daily       No current facility-administered medications for this visit.       ALLERGIES:  Allergies   Allergen Reactions     No Known Allergies        SOCIAL HISTORY:  I have reviewed this patient's social history and updated it with pertinent information if needed. Brenda Bacon  reports that she has never smoked. She has never used smokeless tobacco. She reports that she does not currently use alcohol. She reports that she does not use drugs.    FAMILY HISTORY:  I have reviewed this patient's family history and updated it with pertinent information if needed.   Family History   Problem Relation Age of Onset     Hip dysplasia Mother      Hypertension Father      Hyperlipidemia Father      No Known Problems Sister      No Known Problems Maternal Grandmother      Asthma Paternal Grandmother      Chronic Obstructive Pulmonary Disease Paternal Grandmother      Diabetes Paternal Grandfather      Heart Disease Paternal Grandfather          of MI at 71     Hip dysplasia Son         left       REVIEW OF SYSTEMS:  Constitutional:  No weight loss, fever, chills  HEENT:  Eyes:  No visual loss, blurred vision, double vision or yellow sclerae. No hearing loss, sneezing, congestion, runny nose or sore throat.  Skin:  No rash or itching.  Cardiovascular: per HPI  Respiratory: per HPI  GI:  No anorexia, nausea, vomiting or diarrhea. No abdominal pain or blood.  :  No dysurea, hematuria  Neurologic:  No headache, paralysis, ataxia, numbness or tingling in the extremities. No change in bowel or bladder control.  Musculoskeletal:  No muscle pain  Hematologic:  No bleeding or bruising.  Lymphatics:  No enlarged nodes. No history of splenectomy.  Endocrine:  No reports of sweating, cold or  "heat intolerance. No polyuria or polydipsia.  Allergies:  No history of asthma, hives, eczema or rhinitis.    PHYSICAL EXAM:  BP (!) 134/92 (BP Location: Right arm, Patient Position: Sitting, Cuff Size: Adult Regular)   Pulse 78   Resp 16   Ht 1.676 m (5' 6\")   Wt 65 kg (143 lb 4.8 oz)   LMP 2024 (Approximate)   SpO2 99%   Breastfeeding Yes   BMI 23.13 kg/m        Constitutional: awake, alert, no distress  Eyes: PERRL, sclera nonicteric  ENT: trachea midline  Respiratory: Lungs clear  Cardiovascular: Regular rate and rhythm, no murmurs  GI: nondistended, nontender, bowel sounds present  Lymph/Hematologic: no lymphadenopathy  Skin: dry, no rash  Musculoskeletal: good muscle tone, strength 5/5 in upper and lower extremities  Neurologic: no focal deficits  Neuropsychiatric: appropriate affact    DATA:  Labs:     Recent Labs   Lab Test 17  0813   CHOL 189   HDL 61   *   TRIG 59     2025: NT proBNP 126, serial troponins negative    EKG January 3 9:17 AM: Possible junctional rhythm with left bundle branch block.  January 3 9:40 AM: Sinus rhythm, rate 59, narrow QRS  : Sinus rhythm, rate 70, narrow QRS    ASSESSMENT:  29-year-old female seen for cardiomyopathy.  She has a mild cardiomyopathy.  She had no symptoms with either pregnancy, specifically no heart failure symptoms or fluid retention.  She has been asymptomatic from a cardiac perspective since her delivery about 5 weeks ago.  She had what appeared to be a short junctional run during her , suspect this was anesthesia or stress-induced, possibly vagal.  Her Zio monitor was normal.    We mostly talked about the mild cardiomyopathy today.  It is unknown if this is chronic or more acute.  Echo was done the same day as her , unclear if this was some stress from that day or potentially a peripartum cardiomyopathy.  Limited echo will be done to reassess ejection fraction.  If it is still down, will recommend " cardiac MRI for further evaluation.  She would benefit from medication as well, but is breast-feeding.  Generally metoprolol has been deemed safe with breast-feeding.  She is not planning to have anymore children and her  is scheduled for a vasectomy.  We did briefly talk about the risk of cardiomyopathy and subsequent pregnancies if this was thought to be peripartum.    Generally speaking for peripartum cardiomyopathy with recurrent pregnancies, there is limited data and very small sample size in the literature.  Ejection fraction typically decreases a little.  The risk of a more significant drop in ejection fraction with CHF is around 20-30%.  The best predictor is the prepregnancy EF.  Recommend echo at the end of the second trimester, before delivery, predischarge, and one month post delivery.  NT proBNP will be checked periodically.     RECOMMENDATIONS:  1.  Mild cardiomyopathy, possible peripartum  -Limited echo, if ejection fraction still reduced, then cardiac MRI and discuss medical treatment    Follow-up based on test results.    Avila Covarrubias MD  Cardiology - [facility]  Pager:  [phone]  Text Page  February 11, 2025        Thank you for allowing me to participate in the care of your patient.      Sincerely,     Avila Covarrubias MD     [facility]  cc:   Lydia Blair MD  [address] [place] [address]

## 2025-02-19 ENCOUNTER — HOSPITAL ENCOUNTER (OUTPATIENT)
Dept: CARDIOLOGY | Facility: CLINIC | Age: 30
Discharge: HOME | End: 2025-02-19
Attending: INTERNAL MEDICINE
Payer: COMMERCIAL

## 2025-02-19 DIAGNOSIS — I42.8 OTHER CARDIOMYOPATHY (H): ICD-10-CM

## 2025-02-19 LAB — LVEF ECHO: NORMAL

## 2025-02-19 PROCEDURE — 93308 TTE F-UP OR LMTD: CPT

## 2025-02-19 NOTE — RESULT ENCOUNTER NOTE
EF 55% (40-45% on 1/3/25 echo); no WMAs; no valve disease. Will discuss with Dr Covarrubias for follow up plan

## 2025-03-22 ENCOUNTER — HEALTH MAINTENANCE LETTER (OUTPATIENT)
Age: 30
End: 2025-03-22

## 2025-05-09 ENCOUNTER — MEDICAL CORRESPONDENCE (OUTPATIENT)
Dept: HEALTH INFORMATION MANAGEMENT | Facility: CLINIC | Age: 30
End: 2025-05-09
Payer: COMMERCIAL

## 2025-07-23 ENCOUNTER — MEDICAL CORRESPONDENCE (OUTPATIENT)
Dept: HEALTH INFORMATION MANAGEMENT | Facility: CLINIC | Age: 30
End: 2025-07-23
Payer: COMMERCIAL

## (undated) DEVICE — ADH SKIN CLOSURE PREMIERPRO EXOFIN 1.0ML 3470

## (undated) DEVICE — ESU PENCIL SMOKE EVAC W/ROCKER SWITCH 0703-047-000

## (undated) DEVICE — GLOVE BIOGEL PI MICRO INDICATOR UNDERGLOVE SZ 8.0 48980

## (undated) DEVICE — PREP CHLORAPREP 26ML TINTED ORANGE  260815

## (undated) DEVICE — STOCKING SLEEVE COMPRESSION CALF MED

## (undated) DEVICE — SU PDS II 0 CT-1 36" Z346H

## (undated) DEVICE — STPL SKIN SUBCUTICULAR INSORB 2025

## (undated) DEVICE — CATH TRAY FOLEY 16FR DRAINAGE BAG STATLOCK 899916

## (undated) DEVICE — CLAMP CORD

## (undated) DEVICE — SOL NACL 0.9% IRRIG 1000ML BOTTLE 07138-09

## (undated) DEVICE — GLOVE PROTEXIS W/NEU-THERA 7.5  2D73TE75

## (undated) DEVICE — PACK C-SECTION

## (undated) DEVICE — SUCTION MANIFOLD NEPTUNE 2 SYS 4 PORT 0702-020-000

## (undated) DEVICE — ESU GROUND PAD UNIVERSAL W/O CORD

## (undated) DEVICE — SU MONOCRYL 0 CT-1 36" UND Y946H

## (undated) DEVICE — SOL WATER IRRIG 1000ML BOTTLE 2F7114

## (undated) DEVICE — SU VICRYL 4-0 PS-2 27" UND J426H

## (undated) RX ORDER — MORPHINE SULFATE 1 MG/ML
INJECTION, SOLUTION EPIDURAL; INTRATHECAL; INTRAVENOUS
Status: DISPENSED
Start: 2023-02-18

## (undated) RX ORDER — MORPHINE SULFATE 1 MG/ML
INJECTION, SOLUTION EPIDURAL; INTRATHECAL; INTRAVENOUS
Status: DISPENSED
Start: 2025-01-03

## (undated) RX ORDER — FENTANYL CITRATE 50 UG/ML
INJECTION, SOLUTION INTRAMUSCULAR; INTRAVENOUS
Status: DISPENSED
Start: 2025-01-03

## (undated) RX ORDER — FENTANYL CITRATE-0.9 % NACL/PF 10 MCG/ML
PLASTIC BAG, INJECTION (ML) INTRAVENOUS
Status: DISPENSED
Start: 2023-02-18

## (undated) RX ORDER — ONDANSETRON 2 MG/ML
INJECTION INTRAMUSCULAR; INTRAVENOUS
Status: DISPENSED
Start: 2025-01-03

## (undated) RX ORDER — BUPIVACAINE HYDROCHLORIDE 2.5 MG/ML
INJECTION, SOLUTION EPIDURAL; INFILTRATION; INTRACAUDAL
Status: DISPENSED
Start: 2025-01-03

## (undated) RX ORDER — KETOROLAC TROMETHAMINE 30 MG/ML
INJECTION, SOLUTION INTRAMUSCULAR; INTRAVENOUS
Status: DISPENSED
Start: 2023-02-18

## (undated) RX ORDER — ONDANSETRON 2 MG/ML
INJECTION INTRAMUSCULAR; INTRAVENOUS
Status: DISPENSED
Start: 2023-02-18

## (undated) RX ORDER — PHENYLEPHRINE HYDROCHLORIDE 10 MG/ML
INJECTION INTRAVENOUS
Status: DISPENSED
Start: 2025-01-03

## (undated) RX ORDER — BUPIVACAINE HYDROCHLORIDE 2.5 MG/ML
INJECTION, SOLUTION EPIDURAL; INFILTRATION; INTRACAUDAL
Status: DISPENSED
Start: 2023-02-18

## (undated) RX ORDER — DEXAMETHASONE SODIUM PHOSPHATE 10 MG/ML
INJECTION, SOLUTION INTRAMUSCULAR; INTRAVENOUS
Status: DISPENSED
Start: 2025-01-03